# Patient Record
Sex: MALE | Race: WHITE | NOT HISPANIC OR LATINO | Employment: OTHER | URBAN - METROPOLITAN AREA
[De-identification: names, ages, dates, MRNs, and addresses within clinical notes are randomized per-mention and may not be internally consistent; named-entity substitution may affect disease eponyms.]

---

## 2017-02-15 ENCOUNTER — TELEPHONE (OUTPATIENT)
Dept: CARDIOLOGY | Facility: MEDICAL CENTER | Age: 62
End: 2017-02-15

## 2017-02-15 ENCOUNTER — TELEPHONE (OUTPATIENT)
Dept: PULMONOLOGY | Facility: HOSPICE | Age: 62
End: 2017-02-15

## 2017-02-15 DIAGNOSIS — E78.5 HYPERLIPIDEMIA, UNSPECIFIED HYPERLIPIDEMIA TYPE: ICD-10-CM

## 2017-02-15 DIAGNOSIS — I25.10 CORONARY ARTERY DISEASE INVOLVING NATIVE HEART WITHOUT ANGINA PECTORIS, UNSPECIFIED VESSEL OR LESION TYPE: ICD-10-CM

## 2017-02-15 DIAGNOSIS — J44.9 CHRONIC OBSTRUCTIVE PULMONARY DISEASE, UNSPECIFIED COPD TYPE (HCC): Chronic | ICD-10-CM

## 2017-02-15 RX ORDER — METOPROLOL SUCCINATE 50 MG/1
50 TABLET, EXTENDED RELEASE ORAL DAILY
Qty: 90 TAB | Refills: 3 | Status: SHIPPED | OUTPATIENT
Start: 2017-02-15 | End: 2018-01-18 | Stop reason: SDUPTHER

## 2017-02-15 RX ORDER — EZETIMIBE 10 MG/1
10 TABLET ORAL DAILY
Qty: 90 TAB | Refills: 3 | Status: SHIPPED | OUTPATIENT
Start: 2017-02-15 | End: 2017-02-16 | Stop reason: SDUPTHER

## 2017-02-15 RX ORDER — TIOTROPIUM BROMIDE 18 UG/1
CAPSULE ORAL; RESPIRATORY (INHALATION)
Qty: 3 CAP | Refills: 3 | Status: SHIPPED | OUTPATIENT
Start: 2017-02-15 | End: 2018-04-09 | Stop reason: SDUPTHER

## 2017-02-15 NOTE — TELEPHONE ENCOUNTER
Discussed medications in question.  He is questioning if he should be on both Doxazosin 4mg daily & Tamsulosin 0.4 mg daily.  His pharmacist advised him he should not.  They have been filled by his PCP but he is unsure who initially prescribed them.  To Dr. Purvis for recommendations

## 2017-02-15 NOTE — TELEPHONE ENCOUNTER
----- Message from Ade Mcknight sent at 2/15/2017 10:58 AM PST -----  Regarding: med list review  Contact: [phone]  ARLENE/danya  Pt calling to review his med list with regard to mg of 4 meds  (zetia, effient, tizanidine, levothyroxine) and the pharmacy they will be coming from.   Pt [place] out of state recuper[place], can be reached at .

## 2017-02-16 DIAGNOSIS — I25.10 CORONARY ARTERY DISEASE DUE TO CALCIFIED CORONARY LESION: ICD-10-CM

## 2017-02-16 DIAGNOSIS — I25.10 CORONARY ARTERY DISEASE INVOLVING NATIVE HEART WITHOUT ANGINA PECTORIS, UNSPECIFIED VESSEL OR LESION TYPE: ICD-10-CM

## 2017-02-16 DIAGNOSIS — I21.3 ST ELEVATION MYOCARDIAL INFARCTION (STEMI), UNSPECIFIED ARTERY (HCC): ICD-10-CM

## 2017-02-16 DIAGNOSIS — I25.84 CORONARY ARTERY DISEASE DUE TO CALCIFIED CORONARY LESION: ICD-10-CM

## 2017-02-16 DIAGNOSIS — E78.5 HYPERLIPIDEMIA, UNSPECIFIED HYPERLIPIDEMIA TYPE: ICD-10-CM

## 2017-02-16 NOTE — TELEPHONE ENCOUNTER
Caller Name: Norberto White                 Call Back Number: [phone] (home)         Patient approves a detailed voicemail message: yes    Have we ever prescribed this med? Yes.  If yes, what date? 2/14/16    Last OV: 2/21/16    Next OV: 6 month return    DX: COPD    Medications:  Current Outpatient Prescriptions   Medication Sig Dispense Refill   • metoprolol SR (TOPROL XL) 50 MG TABLET SR 24 HR Take 1 Tab by mouth every day. 90 Tab 3   • ezetimibe (ZETIA) 10 MG Tab Take 1 Tab by mouth every day. 90 Tab 3   • temazepam (RESTORIL) 15 MG Cap 1-2 po qhs prn insomnia 60 Cap 1   • Albuterol Sulfate (PROAIR RESPICLICK) 108 (90 BASE) MCG/ACT AEROSOL POWDER, BREATH ACTIVATED Inhale 2 Puffs by mouth as needed (for shortness of breath, wheezing.). every 4-6 hours as needed. 1 Each 5   • SPIRIVA HANDIHALER 18 MCG Cap INHALE THE CONTENTS OF ONE CAPSULE DAILY 90 Cap 3   • prasugrel (EFFIENT) 10 MG Tab Take 1 Tab by mouth every day. 90 Tab 3   • benazepril (LOTENSIN) 20 MG Tab Take 1 Tab by mouth every day. 90 Tab 1   • tizanidine (ZANAFLEX) 4 MG Tab Take 4 mg by mouth every 6 hours as needed.     • Melatonin 10 MG Tab Take  by mouth.     • Cranberry 500 MG Cap Take  by mouth.     • Zinc 50 MG Cap Take 50 mg by mouth every day.     • diphenhydrAMINE (BENADRYL) 25 MG Tab Take 25 mg by mouth every 6 hours as needed for Sleep.     • KRILL OIL PO Take  by mouth.     • calcium polycarbophil (FIBERCON) 625 MG Tab Take 625 mg by mouth every day.     • vitamin D (CHOLECALCIFEROL) 1000 UNIT Tab Take 1,000 Units by mouth every day.     • Flaxseed, Linseed, (FLAXSEED OIL PO) Take  by mouth.     • atorvastatin (LIPITOR) 80 MG tablet Take 1 Tab by mouth every day. 30 Tab 11   • levothyroxine (SYNTHROID) 75 MCG Tab Take 1 Tab by mouth every day. 90 Tab 3   • albuterol (VENTOLIN OR PROVENTIL) 108 (90 BASE) MCG/ACT Aero Soln inhalation aerosol Inhale 2 Puffs by mouth as needed. 8.5 g 3   • furosemide (LASIX) 20 MG TABS Take 1 Tab by  mouth every day. 30 Tab 3   • potassium chloride SA (K-DUR) 10 MEQ TBCR Take 1 Tab by mouth every day. 30 Tab 3   • hydrocodone-acetaminophen (NORCO) 5-325 MG TABS per tablet Take 1-2 Tabs by mouth every four hours as needed ((Pain Scale 1-3) Give 1 tablet first, may give second tablet after 1 hour if pain still unrelieved.). 120 Tab 0   • aspirin EC (ECOTRIN) 81 MG TBEC Take 81 mg by mouth every day.     • tamsulosin (FLOMAX) 0.4 MG capsule Take 0.4 mg by mouth ONE-HALF HOUR AFTER BREAKFAST.     • doxazosin (CARDURA) 4 MG TABS Take 4 mg by mouth every day.     • fluticasone-salmeterol (ADVAIR DISKUS) 500-50 MCG/DOSE AEPB Inhale 1 Puff by mouth every 12 hours.     • nitroglycerin (NITROSTAT) 0.4 MG SUBL Place 1 Tab under tongue as needed for Chest Pain. 25 Tab 3     No current facility-administered medications for this visit.

## 2017-02-17 RX ORDER — EZETIMIBE 10 MG/1
10 TABLET ORAL DAILY
Qty: 90 TAB | Refills: 3 | Status: SHIPPED | OUTPATIENT
Start: 2017-02-17 | End: 2017-12-29 | Stop reason: SDUPTHER

## 2017-02-17 RX ORDER — PRASUGREL 10 MG/1
10 TABLET, FILM COATED ORAL DAILY
Qty: 90 TAB | Refills: 3 | Status: SHIPPED | OUTPATIENT
Start: 2017-02-17 | End: 2017-12-29 | Stop reason: SDUPTHER

## 2017-02-17 NOTE — TELEPHONE ENCOUNTER
Called pt, left message that new scripts were sent to [facility] pharmacy.

## 2017-03-15 DIAGNOSIS — I10 ESSENTIAL HYPERTENSION: ICD-10-CM

## 2017-03-16 RX ORDER — BENAZEPRIL HYDROCHLORIDE 20 MG/1
TABLET ORAL
Qty: 90 TAB | Refills: 0 | Status: SHIPPED | OUTPATIENT
Start: 2017-03-16 | End: 2017-07-03 | Stop reason: SDUPTHER

## 2017-05-02 ENCOUNTER — TELEPHONE (OUTPATIENT)
Dept: PULMONOLOGY | Facility: HOSPICE | Age: 62
End: 2017-05-02

## 2017-05-03 NOTE — TELEPHONE ENCOUNTER
DOCUMENTATION OF PRIOR AUTH STATUS    1. Medication name and dose: Temazepam 15 mg     2. Name and Phone # of Prescription coverage company: [employer] [phone]    3. Date Prior Auth was submitted: 5/2/2017    4. What information was given to obtain insurance decision: Clinical notes    5. Prior Auth letter Approved or Denied: Approved through 5/2/2018    6. Pharmacy notified: Yes    7. Patient notified: Yes

## 2017-06-27 ENCOUNTER — TELEPHONE (OUTPATIENT)
Dept: CARDIOLOGY | Facility: MEDICAL CENTER | Age: 62
End: 2017-06-27

## 2017-06-27 NOTE — TELEPHONE ENCOUNTER
Called patient to find out if he ever had the labs drawn that were ordered by TT on 12/16. Patient states he did not get the labs drawn. Advised patient to go ahead and come in to his appointment still. Patient has a FV with TT on 6/28/17 @ 1:15pm*SHARATH

## 2017-06-28 ENCOUNTER — OFFICE VISIT (OUTPATIENT)
Dept: CARDIOLOGY | Facility: MEDICAL CENTER | Age: 62
End: 2017-06-28
Payer: COMMERCIAL

## 2017-06-28 VITALS
WEIGHT: 315 LBS | SYSTOLIC BLOOD PRESSURE: 108 MMHG | DIASTOLIC BLOOD PRESSURE: 62 MMHG | HEART RATE: 54 BPM | BODY MASS INDEX: 42.66 KG/M2 | OXYGEN SATURATION: 94 % | HEIGHT: 72 IN

## 2017-06-28 DIAGNOSIS — Z95.1 S/P CABG (CORONARY ARTERY BYPASS GRAFT): ICD-10-CM

## 2017-06-28 DIAGNOSIS — G47.33 OSA (OBSTRUCTIVE SLEEP APNEA): ICD-10-CM

## 2017-06-28 DIAGNOSIS — I10 ESSENTIAL HYPERTENSION, BENIGN: ICD-10-CM

## 2017-06-28 DIAGNOSIS — E78.2 MIXED HYPERLIPIDEMIA: ICD-10-CM

## 2017-06-28 DIAGNOSIS — G47.33 OSA ON CPAP: ICD-10-CM

## 2017-06-28 PROCEDURE — 99214 OFFICE O/P EST MOD 30 MIN: CPT | Performed by: INTERNAL MEDICINE

## 2017-06-28 ASSESSMENT — ENCOUNTER SYMPTOMS
LOSS OF CONSCIOUSNESS: 0
WEIGHT LOSS: 0
BRUISES/BLEEDS EASILY: 0
ORTHOPNEA: 0
DOUBLE VISION: 0
PND: 0
BLOOD IN STOOL: 0
EYE DISCHARGE: 0
NAUSEA: 0
HEADACHES: 0
VOMITING: 0
BLURRED VISION: 0
ABDOMINAL PAIN: 0
SENSORY CHANGE: 0
CHILLS: 0
DEPRESSION: 0
HALLUCINATIONS: 0
FEVER: 0
EYE PAIN: 0
CLAUDICATION: 0
PALPITATIONS: 0
MYALGIAS: 0
COUGH: 0
SPEECH CHANGE: 0
SHORTNESS OF BREATH: 0
DIZZINESS: 0
FALLS: 0

## 2017-06-28 NOTE — MR AVS SNAPSHOT
"        Norberto White   2017 1:15 PM   Office Visit   MRN: [id number]    Department:  [facility]   Dept Phone:  [phone]    Description:  Male : 1955   Provider:  Jerry Purvis M.D.           Reason for Visit     Follow-Up           Allergies as of 2017     Allergen Noted Reactions    Cranberry 2016   Unspecified    Morphine 10/14/2014   Anxiety, Unspecified    Pt becomes irritable  Agitation; hot flashes.      You were diagnosed with     Mixed hyperlipidemia   [272.2.ICD-9-CM]       Essential hypertension, benign   [401.1.ICD-9-CM]       S/P CABG (coronary artery bypass graft)   [436754]       ADRYAN (obstructive sleep apnea)   [751896]       ADRYAN on CPAP   [198534]         Vital Signs     Blood Pressure Pulse Height Weight Body Mass Index Oxygen Saturation    108/62 mmHg 54 1.829 m (6' 0.01\") 147.873 kg (326 lb) 44.20 kg/m2 94%    Smoking Status                   Former Smoker           Basic Information     Date Of Birth Sex Race Ethnicity Preferred Language    1955 Male White Non- English      Problem List              ICD-10-CM Priority Class Noted - Resolved    Acute MI, inferolateral wall, initial episode of care (CMS-HCC) I21.19   2013 - Present    Coronary artery disease due to calcified coronary lesion I25.10, I25.84   2014 - Present    Essential hypertension, benign I10   2014 - Present    HLD (hyperlipidemia) E78.5   2014 - Present    Cervical disc disease M50.90   10/13/2014 - Present    STEMI (ST elevation myocardial infarction) (CMS-HCC) I21.3 High  2015 - Present    COPD (chronic obstructive pulmonary disease) (CMS-HCC) (Chronic) J44.9   2015 - Present    Hypothyroid (Chronic) E03.9   2015 - Present    Thrombocytopenia (CMS-HCC) D69.6   2015 - Present    Systolic heart failure secondary to coronary artery disease (CMS-HCC) I50.20, I25.10   2015 - Present    Diastolic heart failure (CMS-HCC) I50.30   " 7/24/2015 - Present    Cardiomyopathy, ischemic I25.5 Medium  7/25/2015 - Present    PAF (paroxysmal atrial fibrillation) (CMS-HCC) I48.0   7/26/2015 - Present    Hypoxia R09.02   10/23/2015 - Present    ADRYAN (obstructive sleep apnea) G47.33   12/14/2016 - Present    Hypoxemia R09.02   12/14/2016 - Present    Hypersomnolence G47.10   12/14/2016 - Present    ADRYAN on CPAP G47.33, Z99.89   6/28/2017 - Present      Health Maintenance        Date Due Completion Dates    IMM DTaP/Tdap/Td Vaccine (1 - Tdap) 5/8/1975 ---    IMM PNEUMOCOCCAL 19-64 (ADULT) MEDIUM RISK SERIES (1 of 1 - PPSV23) 5/8/1975 ---    COLONOSCOPY 5/8/2006 ---    IMM ZOSTER VACCINE 5/8/2016 ---            Current Immunizations     Influenza TIV (IM) 9/1/2014    Pneumococcal Vaccine (PCV7) Historical Data 1/1/2010      Below and/or attached are the medications your provider expects you to take. Review all of your home medications and newly ordered medications with your provider and/or pharmacist. Follow medication instructions as directed by your provider and/or pharmacist. Please keep your medication list with you and share with your provider. Update the information when medications are discontinued, doses are changed, or new medications (including over-the-counter products) are added; and carry medication information at all times in the event of emergency situations     Allergies:  CRANBERRY - Unspecified     MORPHINE - Anxiety,Unspecified               Medications  Valid as of: June 28, 2017 -  2:28 PM    Generic Name Brand Name Tablet Size Instructions for use    Albuterol Sulfate (Aero Soln) albuterol 108 (90 BASE) MCG/ACT Inhale 2 Puffs by mouth as needed.        Albuterol Sulfate (AEROSOL POWDER, BREATH ACTIVATED) Albuterol Sulfate 108 (90 BASE) MCG/ACT Inhale 2 Puffs by mouth as needed (for shortness of breath, wheezing.). every 4-6 hours as needed.        Aspirin (Tablet Delayed Response) ECOTRIN 81 MG Take 81 mg by mouth every day.         Atorvastatin Calcium (Tab) LIPITOR 80 MG Take 1 Tab by mouth every day.        Benazepril HCl (Tab) LOTENSIN 20 MG TAKE 1 TABLET DAILY        Calcium Polycarbophil (Tab) FIBERCON 625 MG Take 625 mg by mouth every day.        Cholecalciferol (Tab) cholecalciferol 1000 UNIT Take 1,000 Units by mouth every day.        Cranberry (Cap) Cranberry 500 MG Take  by mouth.        DiphenhydrAMINE HCl (Tab) BENADRYL 25 MG Take 25 mg by mouth every 6 hours as needed for Sleep.        Doxazosin Mesylate (Tab) CARDURA 4 MG Take 4 mg by mouth every day.        Ezetimibe (Tab) ZETIA 10 MG Take 1 Tab by mouth every day.        Flaxseed (Linseed)   Take  by mouth.        Fluticasone-Salmeterol (AEROSOL POWDER, BREATH ACTIVATED) ADVAIR 500-50 MCG/DOSE Inhale 1 Puff by mouth every 12 hours.        Furosemide (Tab) LASIX 20 MG Take 1 Tab by mouth every day.        Hydrocodone-Acetaminophen (Tab) NORCO 5-325 MG Take 1-2 Tabs by mouth every four hours as needed ((Pain Scale 1-3) Give 1 tablet first, may give second tablet after 1 hour if pain still unrelieved.).        Krill Oil   Take  by mouth.        Levothyroxine Sodium (Tab) SYNTHROID 75 MCG Take 1 Tab by mouth every day.        Melatonin (Tab) Melatonin 10 MG Take  by mouth.        Metoprolol Succinate (TABLET SR 24 HR) TOPROL XL 50 MG Take 1 Tab by mouth every day.        Nitroglycerin (SL Tab) NITROSTAT 0.4 MG Place 1 Tab under tongue as needed for Chest Pain.        Potassium Chloride Diana CR (Tab CR) K-DUR 10 MEQ Take 1 Tab by mouth every day.        Prasugrel HCl (Tab) EFFIENT 10 MG Take 1 Tab by mouth every day.        Tamsulosin HCl (Cap) FLOMAX 0.4 MG Take 0.4 mg by mouth ONE-HALF HOUR AFTER BREAKFAST.        Temazepam (Cap) RESTORIL 15 MG 1-2 po qhs prn insomnia        Tiotropium Bromide Monohydrate (Cap) SPIRIVA 18 MCG INHALE THE CONTENTS OF ONE CAPSULE DAILY        TiZANidine HCl (Tab) ZANAFLEX 4 MG Take 4 mg by mouth every 6 hours as needed.        Zinc (Cap) Zinc 50 MG  Take 50 mg by mouth every day.        .                 Medicines prescribed today were sent to:     [facility] - [place], [place] - [address] [place], [place] [address] [facility] [address]    Phone: [phone] Fax: [phone]    Open 24 Hours?: No    [facility] [place], [place] - [address] [facility]    [address] [facility] [address]    Phone: [phone] Fax: [phone]    Open 24 Hours?: No    [facility] - [address]    [address] [facility] [address] [facility] [address]    Phone: [phone] Fax: [phone]    Open 24 Hours?: No    [facility] - [place], [place] - [address] [place] [address]    Phone: [phone] Fax: [phone]    Open 24 Hours?: No      Medication refill instructions:       If your prescription bottle indicates you have medication refills left, it is not necessary to call your provider’s office. Please contact your pharmacy and they will refill your medication.    If your prescription bottle indicates you do not have any refills left, you may request refills at any time through one of the following ways: The online GenJuice system (except Urgent Care), by calling your provider’s office, or by asking your pharmacy to contact your provider’s office with a refill request. Medication refills are processed only during regular business hours and may not be available until the next business day. Your provider may request additional information or to have a follow-up visit with you prior to refilling your medication.   *Please Note: Medication refills are assigned a new Rx number when refilled electronically. Your pharmacy may indicate that no refills were authorized even though a new prescription for the same medication is available at the pharmacy. Please request the medicine by name with the pharmacy before contacting your provider for a refill.           Your To Do List     Future Labs/Procedures Complete By Expires    COMP METABOLIC PANEL  As directed 6/29/2018         ZinMobi Access Code: [email or web address]  Expires: 7/6/2017  4:17 AM    ZinMobi  A secure, online tool to manage your health information     [employer] ZinMobi® is a secure, online tool that connects you to your personalized health information from the privacy of your home -- day or night - making it very easy for you to manage your healthcare. Once the activation process is completed, you can even access your medical information using the ZinMobi vicki, which is available for free in the Apple Vicki store or Google Play store.     ZinMobi provides the following levels of access (as shown below):   My Chart Features   [facility] Doctor [facility]  Urgent  Care [facility]  Primary Care  Doctor   Email your healthcare team securely and privately 24/7 X X X    Manage appointments: schedule your next appointment; view details of past/upcoming appointments X      Request prescription refills. X      View recent personal medical records, including lab and immunizations X X X X   View health record, including health history, allergies, medications X X X X   Read reports about your outpatient visits, procedures, consult and ER notes X X X X   See your discharge summary, which is a recap of your hospital and/or ER visit that includes your diagnosis, lab results, and care plan. X X       How to register for ZinMobi:  1. Go to  [email or web address].  2. Click on the Sign Up Now box, which takes you to the New Member Sign Up page. You will need to provide the following information:  a. Enter your ZinMobi Access Code exactly as it appears at the top of this page. (You will not need to use this code after you’ve completed the sign-up process. If you do not sign up before the expiration date, you must request a new code.)   b. Enter your date of birth.   c. Enter your home email  address.   d. Click Submit, and follow the next screen’s instructions.  3. Create a Mind The Placet ID. This will be your LeanData login ID and cannot be changed, so think of one that is secure and easy to remember.  4. Create a [employer]acet password. You can change your password at any time.  5. Enter your Password Reset Question and Answer. This can be used at a later time if you forget your password.   6. Enter your e-mail address. This allows you to receive e-mail notifications when new information is available in LeanData.  7. Click Sign Up. You can now view your health information.    For assistance activating your LeanData account, call [phone]

## 2017-06-28 NOTE — PROGRESS NOTES
Subjective:   Norberto White is a 61 y.o. male who presents today for ICM (EF of 45%), HTN, HLP, previous pleural effusion. Patient had lucia course after CABG with persistent pleural effusion requiring repeated thoracentesis.     Recently in April 2016, patient went into the hospital in [facility] for chest pain. At that time patient underwent stenting of his coronary arteries and was told that his venous graft was down.    Patient had LIMA to LAD and saphenous vein graft to distal PDA in the past.    He has ADRYAN and on CPAP. Feeling better these days.    Past Medical History   Diagnosis Date   • EMPHYSEMA    • Acute MI, inferolateral wall, initial episode of care (CMS-HCC) 12/23/2013   • CAD (coronary artery disease) 2/13/2014     Bare-metal stent to the circumflex in December 2013. Prior stent to the LAD diagonal had mild in-stent restenosis. 30% disease is noted in the right coronary artery.   • HLD (hyperlipidemia) 2/13/2014   • Infectious disease      10/5/14 daughter and granddaughter had colds   • Unspecified hemorrhagic conditions      bruises easily related to plavix   • ASTHMA      inhaler daily   • Unspecified disorder of thyroid 2010     on synthroid   • Pain 2013     6/10   • Pain 2014     4/10 neck   • Breath shortness 2013 24/7   • Supplemental oxygen dependent      2.5 liters at night   • Hypertension 2013     states well controlled on meds   • Essential hypertension, benign 2/13/2014   • Other and unspecified angina pectoris    • Arthritis    • Back pain    • Chronic airway obstruction, not elsewhere classified    • Pneumonia    • PAF (paroxysmal atrial fibrillation) (CMS-HCC) 7/26/2015   • ADRYAN on CPAP 6/28/2017     Past Surgical History   Procedure Laterality Date   • Other cardiac surgery  2008     cardiac stents   • Other orthopedic surgery  2012     right knee   • Cervical disk and fusion anterior  10/13/2014     Performed by Moises Kerns M.D. at SURGERY [facility]   • Gyn  surgery     • Multiple coronary artery bypass endo vein harvest N/A 7/23/2015     Procedure: MULTIPLE CORONARY ARTERY BYPASS ENDO VEIN HARVEST;  Surgeon: Deuce Tam M.D.;  Location: SURGERY [facility];  Service:      Family History   Problem Relation Age of Onset   • Heart Attack Father      History   Smoking status   • Former Smoker -- 2.00 packs/day for 20 years   • Types: Cigarettes, Cigars   • Quit date: 10/07/1999   Smokeless tobacco   • Never Used     Allergies   Allergen Reactions   • Cranberry Unspecified   • Morphine Anxiety and Unspecified     Pt becomes irritable  Agitation; hot flashes.     Outpatient Encounter Prescriptions as of 6/28/2017   Medication Sig Dispense Refill   • benazepril (LOTENSIN) 20 MG Tab TAKE 1 TABLET DAILY 90 Tab 0   • ezetimibe (ZETIA) 10 MG Tab Take 1 Tab by mouth every day. 90 Tab 3   • prasugrel (EFFIENT) 10 MG Tab Take 1 Tab by mouth every day. 90 Tab 3   • metoprolol SR (TOPROL XL) 50 MG TABLET SR 24 HR Take 1 Tab by mouth every day. 90 Tab 3   • Albuterol Sulfate (PROAIR RESPICLICK) 108 (90 BASE) MCG/ACT AEROSOL POWDER, BREATH ACTIVATED Inhale 2 Puffs by mouth as needed (for shortness of breath, wheezing.). every 4-6 hours as needed. 3 Each 3   • fluticasone-salmeterol (ADVAIR DISKUS) 500-50 MCG/DOSE AEROSOL POWDER, BREATH ACTIVATED Inhale 1 Puff by mouth every 12 hours. 3 Inhaler 3   • tiotropium (SPIRIVA HANDIHALER) 18 MCG Cap INHALE THE CONTENTS OF ONE CAPSULE DAILY 3 Cap 3   • tizanidine (ZANAFLEX) 4 MG Tab Take 4 mg by mouth every 6 hours as needed.     • Melatonin 10 MG Tab Take  by mouth.     • Cranberry 500 MG Cap Take  by mouth.     • Zinc 50 MG Cap Take 50 mg by mouth every day.     • diphenhydrAMINE (BENADRYL) 25 MG Tab Take 25 mg by mouth every 6 hours as needed for Sleep.     • calcium polycarbophil (FIBERCON) 625 MG Tab Take 625 mg by mouth every day.     • vitamin D (CHOLECALCIFEROL) 1000 UNIT Tab Take 1,000 Units by mouth every day.     •  atorvastatin (LIPITOR) 80 MG tablet Take 1 Tab by mouth every day. 30 Tab 11   • levothyroxine (SYNTHROID) 75 MCG Tab Take 1 Tab by mouth every day. 90 Tab 3   • albuterol (VENTOLIN OR PROVENTIL) 108 (90 BASE) MCG/ACT Aero Soln inhalation aerosol Inhale 2 Puffs by mouth as needed. 8.5 g 3   • furosemide (LASIX) 20 MG TABS Take 1 Tab by mouth every day. 30 Tab 3   • potassium chloride SA (K-DUR) 10 MEQ TBCR Take 1 Tab by mouth every day. 30 Tab 3   • aspirin EC (ECOTRIN) 81 MG TBEC Take 81 mg by mouth every day.     • tamsulosin (FLOMAX) 0.4 MG capsule Take 0.4 mg by mouth ONE-HALF HOUR AFTER BREAKFAST.     • doxazosin (CARDURA) 4 MG TABS Take 4 mg by mouth every day.     • nitroglycerin (NITROSTAT) 0.4 MG SUBL Place 1 Tab under tongue as needed for Chest Pain. 25 Tab 3   • temazepam (RESTORIL) 15 MG Cap 1-2 po qhs prn insomnia 60 Cap 1   • KRILL OIL PO Take  by mouth.     • Flaxseed, Linseed, (FLAXSEED OIL PO) Take  by mouth.     • hydrocodone-acetaminophen (NORCO) 5-325 MG TABS per tablet Take 1-2 Tabs by mouth every four hours as needed ((Pain Scale 1-3) Give 1 tablet first, may give second tablet after 1 hour if pain still unrelieved.). 120 Tab 0     No facility-administered encounter medications on file as of 6/28/2017.     Review of Systems   Constitutional: Negative for fever, chills, weight loss and malaise/fatigue.   HENT: Negative for ear discharge, ear pain, hearing loss and nosebleeds.    Eyes: Negative for blurred vision, double vision, pain and discharge.   Respiratory: Negative for cough and shortness of breath.    Cardiovascular: Negative for chest pain, palpitations, orthopnea, claudication, leg swelling and PND.   Gastrointestinal: Negative for nausea, vomiting, abdominal pain, blood in stool and melena.   Genitourinary: Negative for dysuria and hematuria.   Musculoskeletal: Negative for myalgias, joint pain and falls.   Skin: Negative for itching and rash.   Neurological: Negative for dizziness,  "sensory change, speech change, loss of consciousness and headaches.   Endo/Heme/Allergies: Negative for environmental allergies. Does not bruise/bleed easily.   Psychiatric/Behavioral: Negative for depression, suicidal ideas and hallucinations.        Objective:   /62 mmHg  Pulse 54  Ht 1.829 m (6' 0.01\")  Wt 147.873 kg (326 lb)  BMI 44.20 kg/m2  SpO2 94%    Physical Exam   Constitutional: He is oriented to person, place, and time. He appears well-developed and well-nourished.   HENT:   Head: Normocephalic and atraumatic.   Eyes: EOM are normal.   Neck: Normal range of motion. No JVD present.   Cardiovascular: Normal rate, regular rhythm, normal heart sounds and intact distal pulses.  Exam reveals no gallop and no friction rub.    No murmur heard.  Bilateral femoral pulses are 2+, bilateral dorsalis pedis pulses are 2+, bilateral posterior tibialis pulses are 2+.   Pulmonary/Chest: No respiratory distress. He has no wheezes. He has no rales. He exhibits no tenderness.   Abdominal: Soft. Bowel sounds are normal. There is no tenderness. There is no rebound and no guarding.   The is no presence of abdominal bruits   Musculoskeletal: Normal range of motion.   Neurological: He is alert and oriented to person, place, and time.   Skin: Skin is warm and dry.   Psychiatric: He has a normal mood and affect.   Nursing note and vitals reviewed.      Assessment:     1. Mixed hyperlipidemia  COMP METABOLIC PANEL    LIPID PANEL   2. Essential hypertension, benign  COMP METABOLIC PANEL    LIPID PANEL   3. S/P CABG (coronary artery bypass graft)  COMP METABOLIC PANEL    LIPID PANEL   4. ADRYAN (obstructive sleep apnea)     5. ADRYAN on CPAP         Medical Decision Making:  Today's Assessment / Status / Plan:     At this time patient is clinically stable in terms of his cardiac standpoint.  Cont current medications at current dose.   Blood pressure is well controlled.  Continue ASA, Prasugrel, Toprol XL 50 mg daily, Benazepril " 20 mg po daily, Atorvastatin 80 mg daily.    I will see patient back in clinic with lab tests and studies results in 12 months.    I thank you Dr. Velarde for referring patient to our Cardiology Clinic today.

## 2017-06-28 NOTE — Clinical Note
[facility]   [address]  [place] [address]  Phone: [phone]  Fax: [phone]              Norberto White  1955    Encounter Date: 6/28/2017    Jerry Purvis M.D.          PROGRESS NOTE:  Subjective:   Norberto White is a 61 y.o. male who presents today for ICM (EF of 45%), HTN, HLP, previous pleural effusion. Patient had lucia course after CABG with persistent pleural effusion requiring repeated thoracentesis.     Recently in April 2016, patient went into the hospital in [facility] for chest pain. At that time patient underwent stenting of his coronary arteries and was told that his venous graft was down.    Patient had LIMA to LAD and saphenous vein graft to distal PDA in the past.    He has ADRYAN and on CPAP. Feeling better these days.    Past Medical History   Diagnosis Date   • EMPHYSEMA    • Acute MI, inferolateral wall, initial episode of care (CMS-HCC) 12/23/2013   • CAD (coronary artery disease) 2/13/2014     Bare-metal stent to the circumflex in December 2013. Prior stent to the LAD diagonal had mild in-stent restenosis. 30% disease is noted in the right coronary artery.   • HLD (hyperlipidemia) 2/13/2014   • Infectious disease      10/5/14 daughter and granddaughter had colds   • Unspecified hemorrhagic conditions      bruises easily related to plavix   • ASTHMA      inhaler daily   • Unspecified disorder of thyroid 2010     on synthroid   • Pain 2013     6/10   • Pain 2014     4/10 neck   • Breath shortness 2013 24/7   • Supplemental oxygen dependent      2.5 liters at night   • Hypertension 2013     states well controlled on meds   • Essential hypertension, benign 2/13/2014   • Other and unspecified angina pectoris    • Arthritis    • Back pain    • Chronic airway obstruction, not elsewhere classified    • Pneumonia    • PAF (paroxysmal atrial fibrillation) (CMS-HCC) 7/26/2015   • ADRYAN on CPAP 6/28/2017          Past Surgical History   Procedure Laterality Date   • Other cardiac surgery  2008     cardiac stents   • Other orthopedic surgery  2012     right knee   • Cervical disk and fusion anterior  10/13/2014     Performed by Moises Kerns M.D. at SURGERY [facility]   • Gyn surgery     • Multiple coronary artery bypass endo vein harvest N/A 7/23/2015     Procedure: MULTIPLE CORONARY ARTERY BYPASS ENDO VEIN HARVEST;  Surgeon: Deuce Tam M.D.;  Location: SURGERY [facility];  Service:      Family History   Problem Relation Age of Onset   • Heart Attack Father      History   Smoking status   • Former Smoker -- 2.00 packs/day for 20 years   • Types: Cigarettes, Cigars   • Quit date: 10/07/1999   Smokeless tobacco   • Never Used     Allergies   Allergen Reactions   • Cranberry Unspecified   • Morphine Anxiety and Unspecified     Pt becomes irritable  Agitation; hot flashes.     Outpatient Encounter Prescriptions as of 6/28/2017   Medication Sig Dispense Refill   • benazepril (LOTENSIN) 20 MG Tab TAKE 1 TABLET DAILY 90 Tab 0   • ezetimibe (ZETIA) 10 MG Tab Take 1 Tab by mouth every day. 90 Tab 3   • prasugrel (EFFIENT) 10 MG Tab Take 1 Tab by mouth every day. 90 Tab 3   • metoprolol SR (TOPROL XL) 50 MG TABLET SR 24 HR Take 1 Tab by mouth every day. 90 Tab 3   • Albuterol Sulfate (PROAIR RESPICLICK) 108 (90 BASE) MCG/ACT AEROSOL POWDER, BREATH ACTIVATED Inhale 2 Puffs by mouth as needed (for shortness of breath, wheezing.). every 4-6 hours as needed. 3 Each 3   • fluticasone-salmeterol (ADVAIR DISKUS) 500-50 MCG/DOSE AEROSOL POWDER, BREATH ACTIVATED Inhale 1 Puff by mouth every 12 hours. 3 Inhaler 3   • tiotropium (SPIRIVA HANDIHALER) 18 MCG Cap INHALE THE CONTENTS OF ONE CAPSULE DAILY 3 Cap 3   • tizanidine (ZANAFLEX) 4 MG Tab Take 4 mg by mouth every 6 hours as needed.     • Melatonin 10 MG Tab Take  by mouth.     • Cranberry 500 MG Cap Take  by mouth.     • Zinc 50 MG Cap Take 50 mg by mouth every day.      • diphenhydrAMINE (BENADRYL) 25 MG Tab Take 25 mg by mouth every 6 hours as needed for Sleep.     • calcium polycarbophil (FIBERCON) 625 MG Tab Take 625 mg by mouth every day.     • vitamin D (CHOLECALCIFEROL) 1000 UNIT Tab Take 1,000 Units by mouth every day.     • atorvastatin (LIPITOR) 80 MG tablet Take 1 Tab by mouth every day. 30 Tab 11   • levothyroxine (SYNTHROID) 75 MCG Tab Take 1 Tab by mouth every day. 90 Tab 3   • albuterol (VENTOLIN OR PROVENTIL) 108 (90 BASE) MCG/ACT Aero Soln inhalation aerosol Inhale 2 Puffs by mouth as needed. 8.5 g 3   • furosemide (LASIX) 20 MG TABS Take 1 Tab by mouth every day. 30 Tab 3   • potassium chloride SA (K-DUR) 10 MEQ TBCR Take 1 Tab by mouth every day. 30 Tab 3   • aspirin EC (ECOTRIN) 81 MG TBEC Take 81 mg by mouth every day.     • tamsulosin (FLOMAX) 0.4 MG capsule Take 0.4 mg by mouth ONE-HALF HOUR AFTER BREAKFAST.     • doxazosin (CARDURA) 4 MG TABS Take 4 mg by mouth every day.     • nitroglycerin (NITROSTAT) 0.4 MG SUBL Place 1 Tab under tongue as needed for Chest Pain. 25 Tab 3   • temazepam (RESTORIL) 15 MG Cap 1-2 po qhs prn insomnia 60 Cap 1   • KRILL OIL PO Take  by mouth.     • Flaxseed, Linseed, (FLAXSEED OIL PO) Take  by mouth.     • hydrocodone-acetaminophen (NORCO) 5-325 MG TABS per tablet Take 1-2 Tabs by mouth every four hours as needed ((Pain Scale 1-3) Give 1 tablet first, may give second tablet after 1 hour if pain still unrelieved.). 120 Tab 0     No facility-administered encounter medications on file as of 6/28/2017.     Review of Systems   Constitutional: Negative for fever, chills, weight loss and malaise/fatigue.   HENT: Negative for ear discharge, ear pain, hearing loss and nosebleeds.    Eyes: Negative for blurred vision, double vision, pain and discharge.   Respiratory: Negative for cough and shortness of breath.    Cardiovascular: Negative for chest pain, palpitations, orthopnea, claudication, leg swelling and PND.   Gastrointestinal:  "Negative for nausea, vomiting, abdominal pain, blood in stool and melena.   Genitourinary: Negative for dysuria and hematuria.   Musculoskeletal: Negative for myalgias, joint pain and falls.   Skin: Negative for itching and rash.   Neurological: Negative for dizziness, sensory change, speech change, loss of consciousness and headaches.   Endo/Heme/Allergies: Negative for environmental allergies. Does not bruise/bleed easily.   Psychiatric/Behavioral: Negative for depression, suicidal ideas and hallucinations.        Objective:   /62 mmHg  Pulse 54  Ht 1.829 m (6' 0.01\")  Wt 147.873 kg (326 lb)  BMI 44.20 kg/m2  SpO2 94%    Physical Exam   Constitutional: He is oriented to person, place, and time. He appears well-developed and well-nourished.   HENT:   Head: Normocephalic and atraumatic.   Eyes: EOM are normal.   Neck: Normal range of motion. No JVD present.   Cardiovascular: Normal rate, regular rhythm, normal heart sounds and intact distal pulses.  Exam reveals no gallop and no friction rub.    No murmur heard.  Bilateral femoral pulses are 2+, bilateral dorsalis pedis pulses are 2+, bilateral posterior tibialis pulses are 2+.   Pulmonary/Chest: No respiratory distress. He has no wheezes. He has no rales. He exhibits no tenderness.   Abdominal: Soft. Bowel sounds are normal. There is no tenderness. There is no rebound and no guarding.   The is no presence of abdominal bruits   Musculoskeletal: Normal range of motion.   Neurological: He is alert and oriented to person, place, and time.   Skin: Skin is warm and dry.   Psychiatric: He has a normal mood and affect.   Nursing note and vitals reviewed.      Assessment:     1. Mixed hyperlipidemia  COMP METABOLIC PANEL    LIPID PANEL   2. Essential hypertension, benign  COMP METABOLIC PANEL    LIPID PANEL   3. S/P CABG (coronary artery bypass graft)  COMP METABOLIC PANEL    LIPID PANEL   4. ADRYAN (obstructive sleep apnea)     5. ADRYAN on CPAP         Medical " Decision Making:  Today's Assessment / Status / Plan:     At this time patient is clinically stable in terms of his cardiac standpoint.  Cont current medications at current dose.   Blood pressure is well controlled.  Continue ASA, Prasugrel, Toprol XL 50 mg daily, Benazepril 20 mg po daily, Atorvastatin 80 mg daily.    I will see patient back in clinic with lab tests and studies results in 12 months.    I thank you Dr. Velarde for referring patient to our Cardiology Clinic today.        Guillermo Velarde M.D.  [address] [place] [address]  VIA Facsimile: [phone]

## 2017-07-03 DIAGNOSIS — I10 ESSENTIAL HYPERTENSION: ICD-10-CM

## 2017-07-03 RX ORDER — BENAZEPRIL HYDROCHLORIDE 20 MG/1
20 TABLET ORAL DAILY
Qty: 90 TAB | Refills: 3 | Status: SHIPPED | OUTPATIENT
Start: 2017-07-03 | End: 2017-07-05 | Stop reason: SDUPTHER

## 2017-07-05 DIAGNOSIS — I10 ESSENTIAL HYPERTENSION: ICD-10-CM

## 2017-07-05 RX ORDER — BENAZEPRIL HYDROCHLORIDE 20 MG/1
20 TABLET ORAL DAILY
Qty: 30 TAB | Refills: 0 | Status: SHIPPED | OUTPATIENT
Start: 2017-07-05 | End: 2018-05-13 | Stop reason: SDUPTHER

## 2017-07-06 ENCOUNTER — SLEEP CENTER VISIT (OUTPATIENT)
Dept: SLEEP MEDICINE | Facility: MEDICAL CENTER | Age: 62
End: 2017-07-06
Payer: COMMERCIAL

## 2017-07-06 VITALS
HEIGHT: 72 IN | WEIGHT: 315 LBS | TEMPERATURE: 89.5 F | HEART RATE: 59 BPM | OXYGEN SATURATION: 95 % | DIASTOLIC BLOOD PRESSURE: 64 MMHG | RESPIRATION RATE: 16 BRPM | SYSTOLIC BLOOD PRESSURE: 110 MMHG | BODY MASS INDEX: 42.66 KG/M2

## 2017-07-06 DIAGNOSIS — G47.31 CENTRAL SLEEP APNEA: ICD-10-CM

## 2017-07-06 DIAGNOSIS — G47.00 INSOMNIA, UNSPECIFIED TYPE: ICD-10-CM

## 2017-07-06 DIAGNOSIS — J44.9 CHRONIC OBSTRUCTIVE PULMONARY DISEASE, UNSPECIFIED COPD TYPE (HCC): Chronic | ICD-10-CM

## 2017-07-06 PROCEDURE — 99213 OFFICE O/P EST LOW 20 MIN: CPT | Performed by: NURSE PRACTITIONER

## 2017-07-06 RX ORDER — TEMAZEPAM 15 MG/1
15 CAPSULE ORAL NIGHTLY PRN
Qty: 30 CAP | Refills: 5 | Status: SHIPPED | OUTPATIENT
Start: 2017-07-06 | End: 2017-12-19 | Stop reason: SDUPTHER

## 2017-07-06 NOTE — MR AVS SNAPSHOT
"        Norberto White   2017 11:20 AM   Sleep Center Visit   MRN: [id number]    Department:  [facility]   Dept Phone:  [phone]    Description:  Male : 1955   Provider:  DYLAN Bess           Reason for Visit     Apnea Compliance      Allergies as of 2017     Allergen Noted Reactions    Cranberry 2016   Unspecified    Morphine 10/14/2014   Anxiety, Unspecified    Pt becomes irritable  Agitation; hot flashes.      You were diagnosed with     Chronic obstructive pulmonary disease, unspecified COPD type (CMS-HCC)   [4641202]       Central sleep apnea   [634691]       Insomnia, unspecified type   [2818286]         Vital Signs     Blood Pressure Pulse Temperature Respirations Height Weight    110/64 mmHg 59 31.9 °C (89.5 °F) 16 1.829 m (6' 0.01\") 145.605 kg (321 lb)    Body Mass Index Oxygen Saturation Smoking Status             43.53 kg/m2 95% Former Smoker         Basic Information     Date Of Birth Sex Race Ethnicity Preferred Language    1955 Male White Non- English      Your appointments     Dec 19, 2017  1:00 PM   Follow UP with DYLAN Bess   [facility] (--)    [address] [facility] [id number] [phone]              Problem List              ICD-10-CM Priority Class Noted - Resolved    Acute MI, inferolateral wall, initial episode of care (CMS-HCC) I21.19   2013 - Present    Coronary artery disease due to calcified coronary lesion I25.10, I25.84   2014 - Present    Essential hypertension, benign I10   2014 - Present    HLD (hyperlipidemia) E78.5   2014 - Present    Cervical disc disease M50.90   10/13/2014 - Present    STEMI (ST elevation myocardial infarction) (CMS-HCC) I21.3 High  2015 - Present    COPD (chronic obstructive pulmonary disease) (CMS-HCC) (Chronic) J44.9   2015 - Present    Hypothyroid (Chronic) E03.9   2015 - Present    Thrombocytopenia (CMS-HCC) " D69.6   7/24/2015 - Present    Systolic heart failure secondary to coronary artery disease (CMS-HCC) I50.20, I25.10   7/24/2015 - Present    Diastolic heart failure (CMS-HCC) I50.30   7/24/2015 - Present    Cardiomyopathy, ischemic I25.5 Medium  7/25/2015 - Present    PAF (paroxysmal atrial fibrillation) (CMS-HCC) I48.0   7/26/2015 - Present    Hypoxia R09.02   10/23/2015 - Present    Hypoxemia R09.02   12/14/2016 - Present    Hypersomnolence G47.10   12/14/2016 - Present    Central sleep apnea G47.31   6/28/2017 - Present      Health Maintenance        Date Due Completion Dates    IMM DTaP/Tdap/Td Vaccine (1 - Tdap) 5/8/1975 ---    IMM PNEUMOCOCCAL 19-64 (ADULT) MEDIUM RISK SERIES (1 of 1 - PPSV23) 5/8/1975 ---    COLONOSCOPY 5/8/2006 ---    IMM ZOSTER VACCINE 5/8/2016 ---    IMM INFLUENZA (1) 9/1/2017 9/1/2014            Current Immunizations     Influenza TIV (IM) 9/1/2014    Pneumococcal Vaccine (PCV7) Historical Data 1/1/2010      Below and/or attached are the medications your provider expects you to take. Review all of your home medications and newly ordered medications with your provider and/or pharmacist. Follow medication instructions as directed by your provider and/or pharmacist. Please keep your medication list with you and share with your provider. Update the information when medications are discontinued, doses are changed, or new medications (including over-the-counter products) are added; and carry medication information at all times in the event of emergency situations     Allergies:  CRANBERRY - Unspecified     MORPHINE - Anxiety,Unspecified               Medications  Valid as of: July 06, 2017 - 11:51 AM    Generic Name Brand Name Tablet Size Instructions for use    Albuterol Sulfate (Aero Soln) albuterol 108 (90 BASE) MCG/ACT Inhale 2 Puffs by mouth as needed.        Albuterol Sulfate (AEROSOL POWDER, BREATH ACTIVATED) Albuterol Sulfate 108 (90 BASE) MCG/ACT Inhale 2 Puffs by mouth as needed (for  shortness of breath, wheezing.). every 4-6 hours as needed.        Aspirin (Tablet Delayed Response) ECOTRIN 81 MG Take 81 mg by mouth every day.        Atorvastatin Calcium (Tab) LIPITOR 80 MG Take 1 Tab by mouth every day.        Benazepril HCl (Tab) LOTENSIN 20 MG Take 1 Tab by mouth every day.        Calcium Polycarbophil (Tab) FIBERCON 625 MG Take 625 mg by mouth every day.        Cholecalciferol (Tab) cholecalciferol 1000 UNIT Take 1,000 Units by mouth every day.        Cranberry (Cap) Cranberry 500 MG Take  by mouth.        DiphenhydrAMINE HCl (Tab) BENADRYL 25 MG Take 25 mg by mouth every 6 hours as needed for Sleep.        Doxazosin Mesylate (Tab) CARDURA 4 MG Take 4 mg by mouth every day.        Ezetimibe (Tab) ZETIA 10 MG Take 1 Tab by mouth every day.        Flaxseed (Linseed)   Take  by mouth.        Fluticasone-Salmeterol (AEROSOL POWDER, BREATH ACTIVATED) ADVAIR 500-50 MCG/DOSE Inhale 1 Puff by mouth every 12 hours.        Furosemide (Tab) LASIX 20 MG Take 1 Tab by mouth every day.        Hydrocodone-Acetaminophen (Tab) NORCO 5-325 MG Take 1-2 Tabs by mouth every four hours as needed ((Pain Scale 1-3) Give 1 tablet first, may give second tablet after 1 hour if pain still unrelieved.).        Krill Oil   Take  by mouth.        Levothyroxine Sodium (Tab) SYNTHROID 75 MCG Take 1 Tab by mouth every day.        Melatonin (Tab) Melatonin 10 MG Take  by mouth.        Metoprolol Succinate (TABLET SR 24 HR) TOPROL XL 50 MG Take 1 Tab by mouth every day.        Nitroglycerin (SL Tab) NITROSTAT 0.4 MG Place 1 Tab under tongue as needed for Chest Pain.        Potassium Chloride Diana CR (Tab CR) K-DUR 10 MEQ Take 1 Tab by mouth every day.        Prasugrel HCl (Tab) EFFIENT 10 MG Take 1 Tab by mouth every day.        Tamsulosin HCl (Cap) FLOMAX 0.4 MG Take 0.4 mg by mouth ONE-HALF HOUR AFTER BREAKFAST.        Temazepam (Cap) RESTORIL 15 MG Take 1 Cap by mouth at bedtime as needed for Sleep.        Tiotropium  Bromide Monohydrate (Cap) SPIRIVA 18 MCG INHALE THE CONTENTS OF ONE CAPSULE DAILY        TiZANidine HCl (Tab) ZANAFLEX 4 MG Take 4 mg by mouth every 6 hours as needed.        Zinc (Cap) Zinc 50 MG Take 50 mg by mouth every day.        .                 Medicines prescribed today were sent to:     [facility] - [place], [place] - [address]    [address] [address]    Phone: [phone] Fax: [phone]    Open 24 Hours?: No    [facility] [place] [address] - [address] [place] [address]    [address] [place] [address]    Phone: [phone] Fax: [phone]    Open 24 Hours?: No    [facility] - [address] [facility] [address]    Phone: [phone] Fax: [phone]    Open 24 Hours?: No      Medication refill instructions:       If your prescription bottle indicates you have medication refills left, it is not necessary to call your provider’s office. Please contact your pharmacy and they will refill your medication.    If your prescription bottle indicates you do not have any refills left, you may request refills at any time through one of the following ways: The online "[employer]" system (except Urgent Care), by calling your provider’s office, or by asking your pharmacy to contact your provider’s office with a refill request. Medication refills are processed only during regular business hours and may not be available until the next business day. Your provider may request additional information or to have a follow-up visit with you prior to refilling your medication.   *Please Note: Medication refills are assigned a new Rx number when refilled electronically. Your pharmacy may indicate that no refills were authorized even though a new prescription for the same medication is available at the pharmacy. Please request the medicine by name with the pharmacy before contacting your provider for a  refill.        Instructions    1. Continue ASV nightly.  2. Clean equipment weekly and replace supplies as allowed by insurance.  3. Sinus rinse daily.  4. Follow with Flonase Sensimist.  5. Temazepam 15 mg #30, 5 refills.            [employer] Access Code: [email or web address]  Expires: 8/5/2017 11:07 AM    [employer]  A secure, online tool to manage your health information     [employer]’s [employer]® is a secure, online tool that connects you to your personalized health information from the privacy of your home -- day or night - making it very easy for you to manage your healthcare. Once the activation process is completed, you can even access your medical information using the [employer] vicki, which is available for free in the Apple Vicki store or Google Play store.     [employer] provides the following levels of access (as shown below):   My Chart Features   [facility] Primary Care Doctor [facility]  Specialists [facility]  Urgent  Care Non-[facility]  Primary Care  Doctor   Email your healthcare team securely and privately 24/7 X X X    Manage appointments: schedule your next appointment; view details of past/upcoming appointments X      Request prescription refills. X      View recent personal medical records, including lab and immunizations X X X X   View health record, including health history, allergies, medications X X X X   Read reports about your outpatient visits, procedures, consult and ER notes X X X X   See your discharge summary, which is a recap of your hospital and/or ER visit that includes your diagnosis, lab results, and care plan. X X       How to register for [employer]:  1. Go to  [email or web address].  2. Click on the Sign Up Now box, which takes you to the New Member Sign Up page. You will need to provide the following information:  a. Enter your [employer] Access Code exactly as it appears at the top of this page. (You will not need to use this code after you’ve completed the sign-up process. If you do not sign up before  the expiration date, you must request a new code.)   b. Enter your date of birth.   c. Enter your home email address.   d. Click Submit, and follow the next screen’s instructions.  3. Create a [employer] ID. This will be your [employer] login ID and cannot be changed, so think of one that is secure and easy to remember.  4. Create a [employer] password. You can change your password at any time.  5. Enter your Password Reset Question and Answer. This can be used at a later time if you forget your password.   6. Enter your e-mail address. This allows you to receive e-mail notifications when new information is available in [employer].  7. Click Sign Up. You can now view your health information.    For assistance activating your [employer] account, call [phone]

## 2017-07-06 NOTE — PATIENT INSTRUCTIONS
1. Continue ASV nightly.  2. Clean equipment weekly and replace supplies as allowed by insurance.  3. Sinus rinse daily.  4. Follow with Flonase Sensimist.  5. Temazepam 15 mg #30, 5 refills.

## 2017-07-12 ENCOUNTER — TELEPHONE (OUTPATIENT)
Dept: PULMONOLOGY | Facility: HOSPICE | Age: 62
End: 2017-07-12

## 2017-07-12 NOTE — TELEPHONE ENCOUNTER
[facility] called to verify the script for temazepam (RESTORIL) 15 MG Cap. Per the state of [place] they are only allowed to fill up to 4 refills. Pt was written for 30 caps 5 refills. He will need to call to get more refills  After 4

## 2017-12-19 ENCOUNTER — SLEEP CENTER VISIT (OUTPATIENT)
Dept: SLEEP MEDICINE | Facility: MEDICAL CENTER | Age: 62
End: 2017-12-19
Payer: COMMERCIAL

## 2017-12-19 VITALS
SYSTOLIC BLOOD PRESSURE: 132 MMHG | RESPIRATION RATE: 16 BRPM | HEIGHT: 72 IN | BODY MASS INDEX: 39.96 KG/M2 | WEIGHT: 295 LBS | OXYGEN SATURATION: 94 % | HEART RATE: 60 BPM | DIASTOLIC BLOOD PRESSURE: 92 MMHG

## 2017-12-19 DIAGNOSIS — G47.00 INSOMNIA, UNSPECIFIED TYPE: ICD-10-CM

## 2017-12-19 DIAGNOSIS — J44.9 CHRONIC OBSTRUCTIVE PULMONARY DISEASE, UNSPECIFIED COPD TYPE (HCC): Chronic | ICD-10-CM

## 2017-12-19 DIAGNOSIS — G47.31 CENTRAL SLEEP APNEA: ICD-10-CM

## 2017-12-19 PROCEDURE — 99213 OFFICE O/P EST LOW 20 MIN: CPT | Performed by: NURSE PRACTITIONER

## 2017-12-19 RX ORDER — BENAZEPRIL HYDROCHLORIDE 5 MG/1
5 TABLET ORAL
COMMUNITY
End: 2018-01-19

## 2017-12-19 RX ORDER — PRASUGREL 10 MG/1
10 TABLET, FILM COATED ORAL
COMMUNITY
Start: 2016-04-07 | End: 2018-01-19

## 2017-12-19 RX ORDER — DOXAZOSIN MESYLATE 4 MG/1
4 TABLET ORAL
COMMUNITY
End: 2018-01-19

## 2017-12-19 RX ORDER — POTASSIUM CHLORIDE 750 MG/1
TABLET, FILM COATED, EXTENDED RELEASE ORAL
COMMUNITY
End: 2018-01-19

## 2017-12-19 RX ORDER — LEVOTHYROXINE SODIUM 0.07 MG/1
75 TABLET ORAL
COMMUNITY

## 2017-12-19 RX ORDER — ATORVASTATIN CALCIUM 40 MG/1
80 TABLET, FILM COATED ORAL
COMMUNITY
End: 2018-01-19

## 2017-12-19 RX ORDER — EZETIMIBE 10 MG/1
10 TABLET ORAL
COMMUNITY
End: 2018-01-19

## 2017-12-19 RX ORDER — TEMAZEPAM 15 MG/1
15 CAPSULE ORAL NIGHTLY PRN
Qty: 30 CAP | Refills: 5 | Status: SHIPPED | OUTPATIENT
Start: 2017-12-19 | End: 2018-05-28 | Stop reason: SDUPTHER

## 2017-12-19 RX ORDER — FUROSEMIDE 20 MG/1
20 TABLET ORAL
COMMUNITY
End: 2018-01-19

## 2017-12-19 RX ORDER — NITROGLYCERIN 0.4 MG/1
0.4 TABLET SUBLINGUAL
COMMUNITY
Start: 2016-04-07 | End: 2018-01-19

## 2017-12-19 RX ORDER — OMEPRAZOLE 40 MG/1
40 CAPSULE, DELAYED RELEASE ORAL DAILY
COMMUNITY
End: 2021-03-15 | Stop reason: SDUPTHER

## 2017-12-19 RX ORDER — PNEUMOCOCCAL 13-VALENT CONJUGATE VACCINE 2.2; 2.2; 2.2; 2.2; 2.2; 4.4; 2.2; 2.2; 2.2; 2.2; 2.2; 2.2; 2.2 UG/.5ML; UG/.5ML; UG/.5ML; UG/.5ML; UG/.5ML; UG/.5ML; UG/.5ML; UG/.5ML; UG/.5ML; UG/.5ML; UG/.5ML; UG/.5ML; UG/.5ML
INJECTION, SUSPENSION INTRAMUSCULAR
Refills: 0 | COMMUNITY
Start: 2017-12-08 | End: 2020-03-05

## 2017-12-19 RX ORDER — METOPROLOL SUCCINATE 50 MG/1
50 TABLET, EXTENDED RELEASE ORAL
COMMUNITY
End: 2018-01-19

## 2017-12-19 RX ORDER — TAMSULOSIN HYDROCHLORIDE 0.4 MG/1
0.4 CAPSULE ORAL
COMMUNITY
End: 2018-07-09

## 2017-12-19 RX ORDER — INFLUENZA VIRUS VACCINE 15; 15; 15; 15 UG/.5ML; UG/.5ML; UG/.5ML; UG/.5ML
SUSPENSION INTRAMUSCULAR
Refills: 0 | COMMUNITY
Start: 2017-12-08 | End: 2020-03-05

## 2017-12-19 NOTE — PROGRESS NOTES
Chief Complaint   Patient presents with   • Follow-Up         HPI:  This is a 62 y.o. male with a history of chronic obstructive pulmonary disease and central sleep apnea. Pulmonary function tests from 12/12/16 indicates FEV1 2.01 L, 52% predicted, FEV1/FVC percent, and DLCO 112% predicted. Patient is compliant with Advair 500/50 µg twice daily, Spiriva daily and Ventolin HFA as needed. Patient denies any significant shortness of breath or wheezing. He denies fevers, chills, sweats, and hemoptysis. The patient has been having some issues with reflux. I recommend he follow up with primary care. He is quite tired in the daytime. I have recommended follow-up with the titration study. At this time he is declining.    Polysomnogram indicates AHI 14.7 and minimum saturation 84%. The patient is compliant with ASV max pressure 20, EPAP 5/15, PS3/15. Compliance dated 11/19-12/18/17 indicates 100% compliance. The patient is using his machine 8 hours 21 minutes. His AHI has been reduced to 3.1. He continues to use temazepam 15 mg with Benadryl and melatonin to help facilitate sleep. I have explained that our process for controlled substances will be changing in the future I'm not sure what sort of rolled we will have in the future for him to obtain his temazepam.    Past Medical History:   Diagnosis Date   • Acute MI, inferolateral wall, initial episode of care (CMS-HCC) 12/23/2013   • Arthritis    • ASTHMA     inhaler daily   • Back pain    • Breath shortness 2013 24/7   • CAD (coronary artery disease) 2/13/2014    Bare-metal stent to the circumflex in December 2013. Prior stent to the LAD diagonal had mild in-stent restenosis. 30% disease is noted in the right coronary artery.   • Chronic airway obstruction, not elsewhere classified    • EMPHYSEMA    • Essential hypertension, benign 2/13/2014   • HLD (hyperlipidemia) 2/13/2014   • Hypertension 2013    states well controlled on meds   • Infectious disease     10/5/14  "daughter and granddaughter had colds   • ADRYAN on CPAP 6/28/2017   • Other and unspecified angina pectoris    • PAF (paroxysmal atrial fibrillation) (CMS-HCC) 7/26/2015   • Pain 2013    6/10   • Pain 2014    4/10 neck   • Pneumonia    • Supplemental oxygen dependent     2.5 liters at night   • Unspecified disorder of thyroid 2010    on synthroid   • Unspecified hemorrhagic conditions     bruises easily related to plavix       Past Surgical History:   Procedure Laterality Date   • MULTIPLE CORONARY ARTERY BYPASS ENDO VEIN HARVEST N/A 7/23/2015    Procedure: MULTIPLE CORONARY ARTERY BYPASS ENDO VEIN HARVEST;  Surgeon: Deuce Tam M.D.;  Location: SURGERY [facility];  Service:    • CERVICAL DISK AND FUSION ANTERIOR  10/13/2014    Performed by Moises Kerns M.D. at SURGERY [facility]   • OTHER ORTHOPEDIC SURGERY  2012    right knee   • OTHER CARDIAC SURGERY  2008    cardiac stents   • GYN SURGERY         Social History   Substance Use Topics   • Smoking status: Former Smoker     Packs/day: 2.00     Years: 20.00     Types: Cigarettes, Cigars     Quit date: 10/7/1999   • Smokeless tobacco: Never Used   • Alcohol use No       ROS:   Constitutional: Denies fevers, chills, sweats, fatigue, and weight loss.  Eyes: Denies glasses.  Ears/nose/mouth/throat: Denies injury.  Cardiovascular: Denies chest pain, tightness.  Respiratory: See history of present illness.  GI: Denies heartburn, difficulty swallowing, nausea, and vomiting.  Neurological: Denies frequent headaches, dizziness, weakness.    Vitals:  Vitals:    12/19/17 1259   Height: 1.829 m (6' 0.01\")   Weight: (!) 133.8 kg (295 lb)   Weight % change since last entry.: 0 %   BP: 132/92   Pulse: 60   BMI (Calculated): 40   Resp: 16   O2 sat % room air: 94 %       Allergies:  Cranberry and Morphine    Medications:  Current Outpatient Prescriptions   Medication Sig Dispense Refill   • potassium chloride ER (KLOR-CON) 10 MEQ tablet Take 10 mEq by mouth daily.   "   • aspirin EC (ECOTRIN) 81 MG Tablet Delayed Response 81 mg.     • doxazosin (CARDURA) 4 MG Tab 4 mg.     • nitroglycerin (NITROSTAT) 0.4 MG SL Tab 0.4 mg.     • prasugrel (EFFIENT) 10 MG Tab 10 mg.     • tamsulosin (FLOMAX) 0.4 MG capsule 0.4 mg.     • temazepam (RESTORIL) 15 MG Cap Take 1 Cap by mouth at bedtime as needed for Sleep for up to 182 days. 30 Cap 5   • benazepril (LOTENSIN) 20 MG Tab Take 1 Tab by mouth every day. 30 Tab 0   • ezetimibe (ZETIA) 10 MG Tab Take 1 Tab by mouth every day. 90 Tab 3   • prasugrel (EFFIENT) 10 MG Tab Take 1 Tab by mouth every day. 90 Tab 3   • metoprolol SR (TOPROL XL) 50 MG TABLET SR 24 HR Take 1 Tab by mouth every day. 90 Tab 3   • Albuterol Sulfate (PROAIR RESPICLICK) 108 (90 BASE) MCG/ACT AEROSOL POWDER, BREATH ACTIVATED Inhale 2 Puffs by mouth as needed (for shortness of breath, wheezing.). every 4-6 hours as needed. 3 Each 3   • fluticasone-salmeterol (ADVAIR DISKUS) 500-50 MCG/DOSE AEROSOL POWDER, BREATH ACTIVATED Inhale 1 Puff by mouth every 12 hours. 3 Inhaler 3   • tiotropium (SPIRIVA HANDIHALER) 18 MCG Cap INHALE THE CONTENTS OF ONE CAPSULE DAILY 3 Cap 3   • tizanidine (ZANAFLEX) 4 MG Tab Take 4 mg by mouth every 6 hours as needed.     • Cranberry 500 MG Cap Take  by mouth.     • Zinc 50 MG Cap Take 50 mg by mouth every day.     • diphenhydrAMINE (BENADRYL) 25 MG Tab Take 25 mg by mouth every 6 hours as needed for Sleep.     • vitamin D (CHOLECALCIFEROL) 1000 UNIT Tab Take 1,000 Units by mouth every day.     • atorvastatin (LIPITOR) 80 MG tablet Take 1 Tab by mouth every day. 30 Tab 11   • levothyroxine (SYNTHROID) 75 MCG Tab Take 1 Tab by mouth every day. 90 Tab 3   • albuterol (VENTOLIN OR PROVENTIL) 108 (90 BASE) MCG/ACT Aero Soln inhalation aerosol Inhale 2 Puffs by mouth as needed. 8.5 g 3   • furosemide (LASIX) 20 MG TABS Take 1 Tab by mouth every day. 30 Tab 3   • potassium chloride SA (K-DUR) 10 MEQ TBCR Take 1 Tab by mouth every day. 30 Tab 3   •  hydrocodone-acetaminophen (NORCO) 5-325 MG TABS per tablet Take 1-2 Tabs by mouth every four hours as needed ((Pain Scale 1-3) Give 1 tablet first, may give second tablet after 1 hour if pain still unrelieved.). 120 Tab 0   • nitroglycerin (NITROSTAT) 0.4 MG SUBL Place 1 Tab under tongue as needed for Chest Pain. 25 Tab 3   • omeprazole (PRILOSEC) 40 MG delayed-release capsule 40 mg.     • atorvastatin (LIPITOR) 40 MG Tab 80 mg.     • benazepril (LOTENSIN) 5 MG Tab 5 mg.     • ezetimibe (ZETIA) 10 MG Tab 10 mg.     • furosemide (LASIX) 20 MG Tab 20 mg.     • levothyroxine (SYNTHROID) 75 MCG Tab Take 75 mcg by mouth daily.     • metoprolol SR (TOPROL XL) 50 MG TABLET SR 24 HR 50 mg.     • FLUARIX QUADRIVALENT 0.5 ML Suspension Prefilled Syringe injection inject 0.5 milliliter intramuscularly  0   • PREVNAR 13 syringe inject 0.5 milliliter intramuscularly  0   • PREVIDENT 5000 SENSITIVE 1.1-5 % Paste   0   • Melatonin 10 MG Tab Take  by mouth.     • KRILL OIL PO Take  by mouth.     • calcium polycarbophil (FIBERCON) 625 MG Tab Take 625 mg by mouth every day.     • Flaxseed, Linseed, (FLAXSEED OIL PO) Take  by mouth.     • aspirin EC (ECOTRIN) 81 MG TBEC Take 81 mg by mouth every day.     • tamsulosin (FLOMAX) 0.4 MG capsule Take 0.4 mg by mouth ONE-HALF HOUR AFTER BREAKFAST.     • doxazosin (CARDURA) 4 MG TABS Take 4 mg by mouth every day.       No current facility-administered medications for this visit.        PHYSICAL EXAM:  Appearance: Well-developed, well-nourished, no acute distress.  Eyes. PERRL.  Hearing: Grossly intact.  Oropharynx: Tongue normal, posterior pharynx without erythema or exudate.  Respiratory effort: No intercostal retractions or use of accessory muscles.  Lung auscultation: No crackles, wheezing.  Heart auscultation: No murmur, gallop, or rub. Regular rate and rhythm.  Extremities: No cyanosis or edema.  Gait and Station: Normal  Orientation: Oriented to time, place, and  person.    Assessment:  1. Central sleep apnea  DME CNOX BY DME CO   2. Insomnia, unspecified type  temazepam (RESTORIL) 15 MG Cap   3. Chronic obstructive pulmonary disease, unspecified COPD type (CMS-[facility])  AMB PULMONARY FUNCTION TEST/LAB         Plan:  1. Continue Advair 500/50 µg twice daily, Spiriva daily, and Ventolin as needed.  2. Continue temazepam 15 mg nightly. Patient has been informed that this is a habit forming an addictive medication. I have recommended minimal usage with drug holidays.  3. Continue ASV max pressure 25, EPAP 5/15, PS3/15.  4. Clean equipment weekly and replace supplies as allowed by insurance.  5. Pulmonary function tests and next visit in 6 months.  6. Follow-up with primary care regarding reflux.      Return in about 6 months (around 6/19/2018) for With PFT.

## 2017-12-19 NOTE — PATIENT INSTRUCTIONS
1. Continue Advair 500/50 µg twice daily, Spiriva daily, and Ventolin as needed.  2. Continue temazepam 15 mg nightly. Patient has been informed that this is a habit forming an addictive medication. I have recommended minimal usage with drug holidays.  3. Continue ASV max pressure 25, EPAP 5/15, PS3/15.  4. Clean equipment weekly and replace supplies as allowed by insurance.  5. Pulmonary function tests and next visit in 6 months.  6. Follow-up with primary care regarding reflux.

## 2017-12-27 ENCOUNTER — TELEPHONE (OUTPATIENT)
Dept: PULMONOLOGY | Facility: HOSPICE | Age: 62
End: 2017-12-27

## 2017-12-27 DIAGNOSIS — G47.33 OSA (OBSTRUCTIVE SLEEP APNEA): ICD-10-CM

## 2017-12-29 DIAGNOSIS — E78.5 HYPERLIPIDEMIA, UNSPECIFIED HYPERLIPIDEMIA TYPE: ICD-10-CM

## 2017-12-29 DIAGNOSIS — I21.3 ST ELEVATION MYOCARDIAL INFARCTION (STEMI), UNSPECIFIED ARTERY (HCC): ICD-10-CM

## 2017-12-29 DIAGNOSIS — I25.10 CORONARY ARTERY DISEASE INVOLVING NATIVE HEART WITHOUT ANGINA PECTORIS, UNSPECIFIED VESSEL OR LESION TYPE: ICD-10-CM

## 2017-12-29 DIAGNOSIS — I25.10 CORONARY ARTERY DISEASE DUE TO CALCIFIED CORONARY LESION: ICD-10-CM

## 2017-12-29 DIAGNOSIS — I25.84 CORONARY ARTERY DISEASE DUE TO CALCIFIED CORONARY LESION: ICD-10-CM

## 2017-12-29 RX ORDER — PRASUGREL 10 MG/1
10 TABLET, FILM COATED ORAL DAILY
Qty: 90 TAB | Refills: 2 | OUTPATIENT
Start: 2017-12-29 | End: 2018-01-18 | Stop reason: SDUPTHER

## 2017-12-29 RX ORDER — EZETIMIBE 10 MG/1
10 TABLET ORAL DAILY
Qty: 90 TAB | Refills: 1 | OUTPATIENT
Start: 2017-12-29 | End: 2018-01-18 | Stop reason: SDUPTHER

## 2017-12-29 RX ORDER — EZETIMIBE 10 MG/1
10 TABLET ORAL DAILY
Qty: 90 TAB | Refills: 2 | OUTPATIENT
Start: 2017-12-29

## 2018-01-02 NOTE — TELEPHONE ENCOUNTER
Called pt- [place] and feels  his O2 is much better and is wondering if he'd still need it down their. I explained that is a big possibility. He has spot checked himself with his own oximeter and he's in the high 90's. I have advised he get established with a new Pulmonary and Sleep Dr there but that could take awhile, so  to try and see a PCP now so he can retake his opo to see if O2 is still necessary until he's able to establish care with the specialist. Please advise

## 2018-01-18 DIAGNOSIS — I25.10 CORONARY ARTERY DISEASE DUE TO CALCIFIED CORONARY LESION: ICD-10-CM

## 2018-01-18 DIAGNOSIS — I25.10 CORONARY ARTERY DISEASE INVOLVING NATIVE HEART WITHOUT ANGINA PECTORIS, UNSPECIFIED VESSEL OR LESION TYPE: ICD-10-CM

## 2018-01-18 DIAGNOSIS — E78.5 HYPERLIPIDEMIA, UNSPECIFIED HYPERLIPIDEMIA TYPE: ICD-10-CM

## 2018-01-18 DIAGNOSIS — I21.3 ST ELEVATION MYOCARDIAL INFARCTION (STEMI), UNSPECIFIED ARTERY (HCC): ICD-10-CM

## 2018-01-18 DIAGNOSIS — I25.84 CORONARY ARTERY DISEASE DUE TO CALCIFIED CORONARY LESION: ICD-10-CM

## 2018-01-18 RX ORDER — NITROGLYCERIN 0.4 MG/1
0.4 TABLET SUBLINGUAL PRN
Qty: 25 TAB | Refills: 0 | Status: SHIPPED | OUTPATIENT
Start: 2018-01-18 | End: 2018-04-24 | Stop reason: SDUPTHER

## 2018-01-18 NOTE — TELEPHONE ENCOUNTER
patient is asking for a call back   Received: Today   Message Contents   Rosa Molina R.N.             TT/Denisse       Patient is asking to speak to you, he said he's very confused. He received a call saying he needs to make an appt but when he saw Dr. Purvis in June he was told to return in a year. He also said he is traveling right now and he needs about 10 refills, but he doesn't remember the names of the medications he's taking, and if they were all prescribed by Dr. Purvis or another physician. He can be reached at [phone].      Returned call to cell ph # x 2 with no answer, no ring tone, no VM.    Called home phone # and left VM message.
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
## 2018-01-19 RX ORDER — ATORVASTATIN CALCIUM 80 MG/1
80 TABLET, FILM COATED ORAL DAILY
Qty: 90 TAB | Refills: 1 | Status: SHIPPED | OUTPATIENT
Start: 2018-01-19 | End: 2018-01-24 | Stop reason: SDUPTHER

## 2018-01-19 RX ORDER — METOPROLOL SUCCINATE 50 MG/1
50 TABLET, EXTENDED RELEASE ORAL DAILY
Qty: 90 TAB | Refills: 1 | Status: SHIPPED | OUTPATIENT
Start: 2018-01-19 | End: 2018-01-24 | Stop reason: SDUPTHER

## 2018-01-19 RX ORDER — EZETIMIBE 10 MG/1
10 TABLET ORAL DAILY
Qty: 90 TAB | Refills: 1 | Status: SHIPPED | OUTPATIENT
Start: 2018-01-19 | End: 2018-01-24 | Stop reason: SDUPTHER

## 2018-01-19 RX ORDER — FUROSEMIDE 20 MG/1
20 TABLET ORAL DAILY
Qty: 90 TAB | Refills: 1 | Status: SHIPPED | OUTPATIENT
Start: 2018-01-19 | End: 2018-01-24 | Stop reason: SDUPTHER

## 2018-01-19 RX ORDER — DOXAZOSIN MESYLATE 4 MG/1
4 TABLET ORAL DAILY
Qty: 90 TAB | Refills: 1 | Status: SHIPPED | OUTPATIENT
Start: 2018-01-19 | End: 2018-01-24 | Stop reason: SDUPTHER

## 2018-01-19 RX ORDER — POTASSIUM CHLORIDE 750 MG/1
10 TABLET, EXTENDED RELEASE ORAL DAILY
Qty: 90 TAB | Refills: 1 | Status: SHIPPED | OUTPATIENT
Start: 2018-01-19 | End: 2018-01-24 | Stop reason: SDUPTHER

## 2018-01-19 RX ORDER — PRASUGREL 10 MG/1
10 TABLET, FILM COATED ORAL DAILY
Qty: 90 TAB | Refills: 1 | Status: SHIPPED | OUTPATIENT
Start: 2018-01-19 | End: 2018-01-24 | Stop reason: SDUPTHER

## 2018-01-24 DIAGNOSIS — I25.84 CORONARY ARTERY DISEASE DUE TO CALCIFIED CORONARY LESION: ICD-10-CM

## 2018-01-24 DIAGNOSIS — I25.10 CORONARY ARTERY DISEASE INVOLVING NATIVE HEART WITHOUT ANGINA PECTORIS, UNSPECIFIED VESSEL OR LESION TYPE: ICD-10-CM

## 2018-01-24 DIAGNOSIS — I25.10 CORONARY ARTERY DISEASE DUE TO CALCIFIED CORONARY LESION: ICD-10-CM

## 2018-01-24 DIAGNOSIS — I21.3 ST ELEVATION MYOCARDIAL INFARCTION (STEMI), UNSPECIFIED ARTERY (HCC): ICD-10-CM

## 2018-01-24 DIAGNOSIS — E78.5 HYPERLIPIDEMIA, UNSPECIFIED HYPERLIPIDEMIA TYPE: ICD-10-CM

## 2018-01-24 RX ORDER — ATORVASTATIN CALCIUM 80 MG/1
80 TABLET, FILM COATED ORAL DAILY
Qty: 90 TAB | Refills: 1 | Status: SHIPPED | OUTPATIENT
Start: 2018-01-24 | End: 2018-07-31 | Stop reason: SDUPTHER

## 2018-01-24 RX ORDER — EZETIMIBE 10 MG/1
10 TABLET ORAL DAILY
Qty: 90 TAB | Refills: 1 | Status: SHIPPED | OUTPATIENT
Start: 2018-01-24 | End: 2018-03-01 | Stop reason: SDUPTHER

## 2018-01-24 RX ORDER — FUROSEMIDE 20 MG/1
20 TABLET ORAL DAILY
Qty: 90 TAB | Refills: 1 | Status: SHIPPED | OUTPATIENT
Start: 2018-01-24 | End: 2018-07-31 | Stop reason: SDUPTHER

## 2018-01-24 RX ORDER — DOXAZOSIN MESYLATE 4 MG/1
4 TABLET ORAL DAILY
Qty: 90 TAB | Refills: 1 | Status: SHIPPED | OUTPATIENT
Start: 2018-01-24 | End: 2018-07-31 | Stop reason: SDUPTHER

## 2018-01-24 RX ORDER — POTASSIUM CHLORIDE 750 MG/1
10 TABLET, EXTENDED RELEASE ORAL DAILY
Qty: 90 TAB | Refills: 1 | Status: SHIPPED | OUTPATIENT
Start: 2018-01-24 | End: 2018-07-31 | Stop reason: SDUPTHER

## 2018-01-24 RX ORDER — METOPROLOL SUCCINATE 50 MG/1
50 TABLET, EXTENDED RELEASE ORAL DAILY
Qty: 90 TAB | Refills: 1 | Status: SHIPPED | OUTPATIENT
Start: 2018-01-24 | End: 2018-07-31 | Stop reason: SDUPTHER

## 2018-01-24 RX ORDER — PRASUGREL 10 MG/1
10 TABLET, FILM COATED ORAL DAILY
Qty: 90 TAB | Refills: 1 | Status: SHIPPED | OUTPATIENT
Start: 2018-01-24 | End: 2018-07-31 | Stop reason: SDUPTHER

## 2018-03-01 DIAGNOSIS — E78.5 HYPERLIPIDEMIA, UNSPECIFIED HYPERLIPIDEMIA TYPE: ICD-10-CM

## 2018-03-01 DIAGNOSIS — I25.10 CORONARY ARTERY DISEASE INVOLVING NATIVE HEART WITHOUT ANGINA PECTORIS, UNSPECIFIED VESSEL OR LESION TYPE: ICD-10-CM

## 2018-03-01 RX ORDER — EZETIMIBE 10 MG/1
10 TABLET ORAL DAILY
Qty: 90 TAB | Refills: 1 | Status: SHIPPED | OUTPATIENT
Start: 2018-03-01 | End: 2018-07-31 | Stop reason: SDUPTHER

## 2018-03-01 NOTE — TELEPHONE ENCOUNTER
Medication question   Received: Today   Message Contents   Bunny Prieto, Med Ass't  Denisse Molina R.N.             Hi Denisse,     TT pt would like to update his medication list with us as it seems to have changed and he wants to make sure they are [place]. He has also been having some issues getting some RX refilled and wanted to see if we can help to get things smoothed out. I did already send RX refill request to [facility].     Thanks,   Bunny [phone]      Pt called on mobile phone. No answer. LVM with contact information.

## 2018-04-09 DIAGNOSIS — J44.9 CHRONIC OBSTRUCTIVE PULMONARY DISEASE, UNSPECIFIED COPD TYPE (HCC): Chronic | ICD-10-CM

## 2018-04-10 RX ORDER — TIOTROPIUM BROMIDE 18 UG/1
CAPSULE ORAL; RESPIRATORY (INHALATION)
Qty: 90 CAP | Refills: 3 | Status: SHIPPED | OUTPATIENT
Start: 2018-04-10 | End: 2019-03-01 | Stop reason: SDUPTHER

## 2018-04-10 NOTE — TELEPHONE ENCOUNTER
Have we ever prescribed this med? Yes.  If yes, what date? 2/15/17    Last OV: 12/19/17    Next OV: 6/19/18    DX: Chronic obstructive pulmonary disease, unspecified COPD type (CMS-HCC) (J44.9)    Medications: Advair and Spiriva

## 2018-04-24 ENCOUNTER — TELEPHONE (OUTPATIENT)
Dept: CARDIOLOGY | Facility: MEDICAL CENTER | Age: 63
End: 2018-04-24

## 2018-04-24 DIAGNOSIS — R07.89 OTHER CHEST PAIN: Primary | ICD-10-CM

## 2018-04-25 RX ORDER — NITROGLYCERIN 0.4 MG/1
0.4 TABLET SUBLINGUAL PRN
Qty: 75 TAB | Refills: 3 | Status: SHIPPED | OUTPATIENT
Start: 2018-04-25 | End: 2018-07-09 | Stop reason: SDUPTHER

## 2018-05-16 ENCOUNTER — TELEPHONE (OUTPATIENT)
Dept: PULMONOLOGY | Facility: HOSPICE | Age: 63
End: 2018-05-16

## 2018-05-16 DIAGNOSIS — G47.31 CENTRAL SLEEP APNEA: ICD-10-CM

## 2018-05-16 NOTE — TELEPHONE ENCOUNTER
His oxygen was low almost the entire night. Changing pressure would not help given the severity and therefore I also agree that adding oxygen would be beneficial.

## 2018-05-16 NOTE — TELEPHONE ENCOUNTER
Patient had OPO in December of 2017, Noemí thought adding O2 was the next step but he would like to know if the pressure could be adjusted first to see if that helped. He has a follow up 06/19/18-aJswinder. Any advice before then? Thanks/ap

## 2018-05-17 NOTE — TELEPHONE ENCOUNTER
Patient left a voicemail after you left that he was just returning you call. Left a phone number of [phone].

## 2018-05-21 NOTE — TELEPHONE ENCOUNTER
Spoke to patient and he is onboard with the [facility], with his insurance does he need new testing?/ap

## 2018-05-21 NOTE — TELEPHONE ENCOUNTER
I would say yes if it was within 3 months but it is almost 6 months old so most likely not.  He has an appt in a few weeks so I would repeat the CNOX and document the need for the o2 at the follow up instead.  He declined the o2 in the past stating he was moving to [place] and no longer needed it a lower elevation.

## 2018-05-22 NOTE — TELEPHONE ENCOUNTER
He uses [facility]. Can you please send it ans see if they will set up? If they won't let me know and I'll call the patient/ap

## 2018-05-28 DIAGNOSIS — G47.00 INSOMNIA, UNSPECIFIED TYPE: ICD-10-CM

## 2018-05-29 RX ORDER — TEMAZEPAM 15 MG/1
CAPSULE ORAL
Qty: 30 CAP | Refills: 2 | Status: SHIPPED
Start: 2018-05-29 | End: 2018-06-28

## 2018-05-29 NOTE — TELEPHONE ENCOUNTER
Have we ever prescribed this med? Yes.  If yes, what date? 12/19/17    Last OV: 12/19/17    Next OV: 6/19/18    DX: Insomnia, unspecified type (G47.00)    Medications: temazepam (RESTORIL) 15 MG Cap

## 2018-06-01 NOTE — TELEPHONE ENCOUNTER
As discussed, please contact the patient and give him the options, so he can help make the determination if he wants to wait or redo testing now./ap

## 2018-06-01 NOTE — TELEPHONE ENCOUNTER
I am still working with Dima at [facility].  The first response I received was that the patient was going to have to have a titration study in order to get the oxygen.  He also stated they would rent the concentrator @ $110 a month if he wanted to start right away.  I responded back questioning the response since this patients insurance does accept CNOX as qualifying testing and advised I was only asking if they would submit for auth with notes and testing from [facility].  I have not received a response from that.

## 2018-06-05 NOTE — TELEPHONE ENCOUNTER
[facility] is declining to accept the old testing    Please sign for CNOX on PAP and room air to be completed before his OV 6/19

## 2018-06-06 NOTE — TELEPHONE ENCOUNTER
Faxed to [facility] for patient that we will repeat the CNOX and [facility] will call him to schedule before his OV

## 2018-06-18 ENCOUNTER — TELEPHONE (OUTPATIENT)
Dept: SLEEP MEDICINE | Facility: MEDICAL CENTER | Age: 63
End: 2018-06-18

## 2018-06-18 NOTE — TELEPHONE ENCOUNTER
Norberto wants to know if we can give him Cnox results over the phone, he doesn't want to come in for results if he doesn't have to he is already having to do the PFT at our other office.    Are we able to give him results? Pt just wants to know if he needs 02. Please advise

## 2018-06-18 NOTE — TELEPHONE ENCOUNTER
Called Norberto and relayed Casey's last message.  Pt will schedule a f/v at pulmonary at time of PFT visit tomorrow . Thank you

## 2018-06-18 NOTE — TELEPHONE ENCOUNTER
Overnight oximetry 6/13 indicates a mean 02 saturation of 90.3%, He spent 21% of the study with saturations less than 90%. However only 3.4% of the study with saturations less than 88%. No need to add 02 bleed in at this time. We can discuss at follow up. He will need a Pulmonary follow up scheduled after his PFT's.

## 2018-06-19 ENCOUNTER — NON-PROVIDER VISIT (OUTPATIENT)
Dept: PULMONOLOGY | Facility: HOSPICE | Age: 63
End: 2018-06-19
Payer: COMMERCIAL

## 2018-06-19 ENCOUNTER — APPOINTMENT (OUTPATIENT)
Dept: SLEEP MEDICINE | Facility: MEDICAL CENTER | Age: 63
End: 2018-06-19
Payer: COMMERCIAL

## 2018-06-19 VITALS — BODY MASS INDEX: 37.29 KG/M2 | WEIGHT: 275 LBS

## 2018-06-19 DIAGNOSIS — J44.9 CHRONIC OBSTRUCTIVE PULMONARY DISEASE, UNSPECIFIED COPD TYPE (HCC): Chronic | ICD-10-CM

## 2018-06-19 PROCEDURE — 94060 EVALUATION OF WHEEZING: CPT | Performed by: INTERNAL MEDICINE

## 2018-06-19 PROCEDURE — 94729 DIFFUSING CAPACITY: CPT | Performed by: INTERNAL MEDICINE

## 2018-06-19 PROCEDURE — 94726 PLETHYSMOGRAPHY LUNG VOLUMES: CPT | Performed by: INTERNAL MEDICINE

## 2018-06-19 ASSESSMENT — PULMONARY FUNCTION TESTS
FVC: 3.73
FEV1_LLN: 3.17
FEV1/FVC: 63
FVC: 4
FEV1_PERCENT_PREDICTED: 68
FEV1/FVC_PERCENT_PREDICTED: 84
FEV1/FVC_PERCENT_PREDICTED: 75
FVC_LLN: 4.21
FEV1_PERCENT_PREDICTED: 62
FVC_PERCENT_PREDICTED: 74
FEV1/FVC_PERCENT_LLN: 63
FEV1/FVC_PERCENT_PREDICTED: 86
FEV1/FVC_PREDICTED: 75
FEV1/FVC: 65
FEV1: 2.6
FVC_PREDICTED: 5.04
FEV1/FVC_PERCENT_CHANGE: 143
FEV1/FVC_PERCENT_CHANGE: 2
FEV1: 2.36
FEV1/FVC: 65
FEV1_PERCENT_CHANGE: 10
FEV1_PERCENT_CHANGE: 7
FVC_PERCENT_PREDICTED: 79
FEV1/FVC_PERCENT_PREDICTED: 86
FEV1/FVC: 63
FEV1/FVC_PERCENT_PREDICTED: 84
FEV1_PREDICTED: 3.8

## 2018-06-19 NOTE — PROCEDURES
Technician: VIRIDIANA Calvillo    Technician Comment:  Good patient effort & cooperation.  The results of this test meet the ATS/ERS standards for acceptability & reproducibility.  Test was performed on the ArthroCAD Body Plethysmograph-Elite DX system.  Predicted values were [facility]-3 for spirometry, [facility] for DLCO, ITS for Lung Volumes.  The DLCO was uncorrected for Hgb.  A bronchodilator of Ventolin HFA -2puffs via spacer administered.  DLCO performed during dilation period.    Interpretation:    SPIROMETRY:  1. FVC was 4.00 L, 79 % of predicted  2. FEV1 was 2.60 L, 68 % of predicted   3. FEV1/FVC ratio was 65 %  4. There was borderline  response to bronchodilators   5. Flow volume loop was consistent with obstruction    LUNG VOLUMES:  1. RV was 175 % of predicted   2. TLC was 119 % of predicted       DIFFUSION CAPACITY:  1.Diffusion capacity was 120% of predicted       IMPRESSION:  The patient has moderate obstructive lung disease. This is consistent with the patient listed diagnosis of COPD. Clinical correlation is required.

## 2018-07-09 ENCOUNTER — OFFICE VISIT (OUTPATIENT)
Dept: PULMONOLOGY | Facility: HOSPICE | Age: 63
End: 2018-07-09
Payer: COMMERCIAL

## 2018-07-09 ENCOUNTER — TELEPHONE (OUTPATIENT)
Dept: CARDIOLOGY | Facility: MEDICAL CENTER | Age: 63
End: 2018-07-09

## 2018-07-09 ENCOUNTER — OFFICE VISIT (OUTPATIENT)
Dept: CARDIOLOGY | Facility: MEDICAL CENTER | Age: 63
End: 2018-07-09
Payer: COMMERCIAL

## 2018-07-09 VITALS
BODY MASS INDEX: 37.25 KG/M2 | SYSTOLIC BLOOD PRESSURE: 110 MMHG | HEART RATE: 60 BPM | WEIGHT: 275 LBS | HEIGHT: 72 IN | DIASTOLIC BLOOD PRESSURE: 66 MMHG | OXYGEN SATURATION: 94 %

## 2018-07-09 VITALS
SYSTOLIC BLOOD PRESSURE: 112 MMHG | DIASTOLIC BLOOD PRESSURE: 68 MMHG | WEIGHT: 278 LBS | HEIGHT: 72 IN | BODY MASS INDEX: 37.65 KG/M2 | OXYGEN SATURATION: 94 % | TEMPERATURE: 97.7 F | HEART RATE: 61 BPM | RESPIRATION RATE: 18 BRPM

## 2018-07-09 DIAGNOSIS — J44.9 CHRONIC OBSTRUCTIVE PULMONARY DISEASE, UNSPECIFIED COPD TYPE (HCC): Chronic | ICD-10-CM

## 2018-07-09 DIAGNOSIS — I25.10 CORONARY ARTERY DISEASE INVOLVING NATIVE CORONARY ARTERY OF NATIVE HEART WITHOUT ANGINA PECTORIS: ICD-10-CM

## 2018-07-09 DIAGNOSIS — I10 ESSENTIAL HYPERTENSION, BENIGN: ICD-10-CM

## 2018-07-09 DIAGNOSIS — I50.30 DIASTOLIC HEART FAILURE, UNSPECIFIED HF CHRONICITY (HCC): ICD-10-CM

## 2018-07-09 DIAGNOSIS — R06.81 CENTRAL APNEA: ICD-10-CM

## 2018-07-09 DIAGNOSIS — R07.89 OTHER CHEST PAIN: ICD-10-CM

## 2018-07-09 DIAGNOSIS — I50.20 SYSTOLIC HEART FAILURE SECONDARY TO CORONARY ARTERY DISEASE (HCC): ICD-10-CM

## 2018-07-09 DIAGNOSIS — R09.02 HYPOXIA: ICD-10-CM

## 2018-07-09 DIAGNOSIS — G47.31 CENTRAL SLEEP APNEA: ICD-10-CM

## 2018-07-09 DIAGNOSIS — I25.10 SYSTOLIC HEART FAILURE SECONDARY TO CORONARY ARTERY DISEASE (HCC): ICD-10-CM

## 2018-07-09 PROCEDURE — 99214 OFFICE O/P EST MOD 30 MIN: CPT | Performed by: NURSE PRACTITIONER

## 2018-07-09 RX ORDER — NITROGLYCERIN 0.4 MG/1
0.4 TABLET SUBLINGUAL PRN
Qty: 75 TAB | Refills: 3 | Status: SHIPPED | OUTPATIENT
Start: 2018-07-09 | End: 2018-07-31 | Stop reason: SDUPTHER

## 2018-07-09 ASSESSMENT — ENCOUNTER SYMPTOMS
DIZZINESS: 0
SPUTUM PRODUCTION: 0
PALPITATIONS: 0
VOMITING: 0
HEMOPTYSIS: 0
ORTHOPNEA: 0
CLAUDICATION: 0
WEAKNESS: 0
WHEEZING: 0
PND: 0
NAUSEA: 0
COUGH: 0
SHORTNESS OF BREATH: 1

## 2018-07-09 NOTE — PATIENT INSTRUCTIONS
1) Continue ASV at EPAP and change 7/15, PS3/15 with max pressure of [id number]. Pending results of CNOX on updated EPAP setting, will start 2L of oxygen. - call for results  2) Clean mask and supplies weekly and change them as insurance allows  3) Continue Advair 500/50, Spiriva, rescue inhaler  4) Encourage light conditioning  5) Vaccines: Up to date with Pneumovax 23.   6) Return in about 6 months (around 1/9/2019) for Compliance, review of symptoms, if not sooner, follow up with [facility].

## 2018-07-09 NOTE — PROGRESS NOTES
Chief Complaint   Patient presents with   • Hypertension   • Coronary Artery Disease       Subjective:   Norberto White is a 63 y.o. male who presents today for follow up CAD.    He is patient of Dr. Purvis in our clinic, HX: ICM (EF of 45%), HTN, HLP, CAD s/p CABG 2015 with pleural effusion and thoracentesis, paroxysmal atrial fibrillation, ADRYAN with cpap. Recent PCI in [place], [place].     Patient is doing well.  He noticed that the elevation has been affecting his breathing.  He spends the winter season in the [place].  With the elevation changes in [place] he is able to exercise every day and has lost 20 pounds.  He is able to walk 2 miles a day without getting short of breath, but in [place] he has been getting more short of breath from his COPD.    He has had no episodes of chest pain, palpitations, dizziness/lightheadedness, or orthopnea.    Sleep apnea: Following up with pulmonary and titrating his BiPAP    CAD: Denies any chest pain or palpitation.  Tolerating his medication and has not needed to use any nitroglycerin.    Heart failure: Has chronic lower extremity edema.  Otherwise euvolemic.      Past Medical History:   Diagnosis Date   • Acute MI, inferolateral wall, initial episode of care ([facility]) 12/23/2013   • Arthritis    • ASTHMA     inhaler daily   • Back pain    • Breath shortness 2013 24/7   • CAD (coronary artery disease) 2/13/2014    Bare-metal stent to the circumflex in December 2013. Prior stent to the LAD diagonal had mild in-stent restenosis. 30% disease is noted in the right coronary artery.   • Chronic airway obstruction, not elsewhere classified    • EMPHYSEMA    • Essential hypertension, benign 2/13/2014   • HLD (hyperlipidemia) 2/13/2014   • Hypertension 2013    states well controlled on meds   • Infectious disease     10/5/14 daughter and granddaughter had colds   • ADRYAN on CPAP 6/28/2017   • Other and unspecified angina pectoris    • PAF (paroxysmal atrial fibrillation) ([facility])  7/26/2015   • Pain 2013    6/10   • Pain 2014    4/10 neck   • Pneumonia    • Supplemental oxygen dependent     2.5 liters at night   • Unspecified disorder of thyroid 2010    on synthroid   • Unspecified hemorrhagic conditions     bruises easily related to plavix     Past Surgical History:   Procedure Laterality Date   • MULTIPLE CORONARY ARTERY BYPASS ENDO VEIN HARVEST N/A 7/23/2015    Procedure: MULTIPLE CORONARY ARTERY BYPASS ENDO VEIN HARVEST;  Surgeon: Deuce Tam M.D.;  Location: SURGERY [facility];  Service:    • CERVICAL DISK AND FUSION ANTERIOR  10/13/2014    Performed by Moises Kerns M.D. at SURGERY [facility]   • OTHER ORTHOPEDIC SURGERY  2012    right knee   • OTHER CARDIAC SURGERY  2008    cardiac stents   • GYN SURGERY       Family History   Problem Relation Age of Onset   • Heart Attack Father    • Cancer Mother      Social History     Social History   • Marital status:      Spouse name: N/A   • Number of children: N/A   • Years of education: N/A     Occupational History   • Not on file.     Social History Main Topics   • Smoking status: Former Smoker     Packs/day: 2.00     Years: 20.00     Types: Cigarettes, Cigars     Quit date: 10/7/1999   • Smokeless tobacco: Never Used   • Alcohol use No   • Drug use: No   • Sexual activity: Not on file     Other Topics Concern   • Not on file     Social History Narrative   • No narrative on file     Allergies   Allergen Reactions   • Morphine Anxiety and Unspecified     Pt becomes irritable  Agitation; hot flashes.     Outpatient Encounter Prescriptions as of 7/9/2018   Medication Sig Dispense Refill   • nitroglycerin (NITROSTAT) 0.4 MG SL Tab Place 1 Tab under tongue as needed for Chest Pain (Place 1 tablet under the tongue every 5 minutes up to 3 doses as needed for chest pain). 75 Tab 3   • FLUARIX QUADRIVALENT 0.5 ML Suspension Prefilled Syringe injection inject 0.5 milliliter intramuscularly  0   • PREVNAR 13 syringe inject 0.5  milliliter intramuscularly  0   • PREVIDENT 5000 SENSITIVE 1.1-5 % Paste   0   • benazepril (LOTENSIN) 20 MG Tab Take 1 Tab by mouth every day. 90 Tab 1   • [DISCONTINUED] nitroglycerin (NITROSTAT) 0.4 MG SL Tab Place 1 Tab under tongue as needed for Chest Pain (Place 1 tablet under the tongue every 5 minutes up to 3 doses as needed for chest pain). 75 Tab 3   • SPIRIVA HANDIHALER 18 MCG Cap INHALE THE CONTENTS OF 1  CAPSULE VIA HANDIHALER  DAILY 90 Cap 3   • ADVAIR DISKUS 500-50 MCG/DOSE AEROSOL POWDER, BREATH ACTIVATED USE 1 PUFF EVERY 12 HOURS 3 Inhaler 3   • ezetimibe (ZETIA) 10 MG Tab Take 1 Tab by mouth every day. 90 Tab 1   • atorvastatin (LIPITOR) 80 MG tablet Take 1 Tab by mouth every day. 90 Tab 1   • doxazosin (CARDURA) 4 MG Tab Take 1 Tab by mouth every day. 90 Tab 1   • furosemide (LASIX) 20 MG Tab Take 1 Tab by mouth every day. 90 Tab 1   • metoprolol SR (TOPROL XL) 50 MG TABLET SR 24 HR Take 1 Tab by mouth every day. 90 Tab 1   • potassium chloride SA (K-DUR) 10 MEQ Tab CR Take 1 Tab by mouth every day. 90 Tab 1   • prasugrel (EFFIENT) 10 MG Tab Take 1 Tab by mouth every day. 90 Tab 1   • omeprazole (PRILOSEC) 40 MG delayed-release capsule 40 mg.     • levothyroxine (SYNTHROID) 75 MCG Tab Take 75 mcg by mouth daily.     • [DISCONTINUED] tamsulosin (FLOMAX) 0.4 MG capsule 0.4 mg.     • Albuterol Sulfate (PROAIR RESPICLICK) 108 (90 BASE) MCG/ACT AEROSOL POWDER, BREATH ACTIVATED Inhale 2 Puffs by mouth as needed (for shortness of breath, wheezing.). every 4-6 hours as needed. 3 Each 3   • tizanidine (ZANAFLEX) 4 MG Tab Take 4 mg by mouth every 6 hours as needed.     • Melatonin 10 MG Tab Take  by mouth.     • Cranberry 500 MG Cap Take  by mouth.     • Zinc 50 MG Cap Take 50 mg by mouth every day.     • diphenhydrAMINE (BENADRYL) 25 MG Tab Take 25 mg by mouth every 6 hours as needed for Sleep.     • KRILL OIL PO Take  by mouth.     • calcium polycarbophil (FIBERCON) 625 MG Tab Take 625 mg by mouth every  day.     • vitamin D (CHOLECALCIFEROL) 1000 UNIT Tab Take 1,000 Units by mouth every day.     • Flaxseed, Linseed, (FLAXSEED OIL PO) Take  by mouth.     • [DISCONTINUED] albuterol (VENTOLIN OR PROVENTIL) 108 (90 BASE) MCG/ACT Aero Soln inhalation aerosol Inhale 2 Puffs by mouth as needed. (Patient not taking: Reported on 7/9/2018) 8.5 g 3   • hydrocodone-acetaminophen (NORCO) 5-325 MG TABS per tablet Take 1-2 Tabs by mouth every four hours as needed ((Pain Scale 1-3) Give 1 tablet first, may give second tablet after 1 hour if pain still unrelieved.). 120 Tab 0   • aspirin EC (ECOTRIN) 81 MG TBEC Take 81 mg by mouth every day.     • tamsulosin (FLOMAX) 0.4 MG capsule Take 0.4 mg by mouth ONE-HALF HOUR AFTER BREAKFAST.       No facility-administered encounter medications on file as of 7/9/2018.      Review of Systems   Constitutional: Negative for malaise/fatigue.   Respiratory: Positive for shortness of breath. Negative for cough, hemoptysis, sputum production and wheezing.    Cardiovascular: Positive for leg swelling. Negative for chest pain, palpitations, orthopnea, claudication and PND.   Gastrointestinal: Negative for nausea and vomiting.   Neurological: Negative for dizziness and weakness.        Objective:   /66   Pulse 60   Ht 1.829 m (6')   Wt 124.7 kg (275 lb)   SpO2 94%   BMI 37.30 kg/m²     Physical Exam   Constitutional: He is oriented to person, place, and time. He appears well-developed and well-nourished. No distress.   HENT:   Head: Normocephalic and atraumatic.   Eyes: Pupils are equal, round, and reactive to light.   Neck: No JVD present.   Cardiovascular: Normal rate, regular rhythm and normal heart sounds.    Sternal incision well healed    Pulmonary/Chest: Effort normal and breath sounds normal.   Abdominal: Soft. Bowel sounds are normal. He exhibits no distension.   Musculoskeletal: He exhibits edema (1+ pitting edema bilateral ).   Neurological: He is alert and oriented to person,  place, and time.   Skin: Skin is warm and dry. He is not diaphoretic.   Psychiatric: He has a normal mood and affect. His behavior is normal. Judgment and thought content normal.           Echocardiography Laboratory  6/14/2016  Left ventricular ejection fraction is visually estimated to be 50%.   Grade II diastolic dysfunction.   Poor endocardial definition difficult to assess wall motion.  Trace mitral regurgitation.  Right ventricular systolic pressure is estimated to be 35 mmHg.  No pericardial effusion seen.    Labs  8/30/2017  BUN 25  Creatinine 1.1  Sodium 140   potassium 4.5  Cholesterol 123   HDL 33  LDL 68   Triglyceride 111      Assessment:     1. Coronary artery disease involving native coronary artery of native heart without angina pectoris  nitroglycerin (NITROSTAT) 0.4 MG SL Tab    COMP METABOLIC PANEL    LIPID PROFILE    07/23/2015 was coronary bypass grafting x2 with left internal mammary artery to the distal diagonal artery,   reverse saphenous vein graft to the distal PDA   2. Systolic heart failure secondary to coronary artery disease (HCC)  nitroglycerin (NITROSTAT) 0.4 MG SL Tab    COMP METABOLIC PANEL    LIPID PROFILE   3. Diastolic heart failure, unspecified HF chronicity (HCC)  nitroglycerin (NITROSTAT) 0.4 MG SL Tab    COMP METABOLIC PANEL    LIPID PROFILE   4. Essential hypertension, benign     5. Other chest pain  nitroglycerin (NITROSTAT) 0.4 MG SL Tab   6. Central sleep apnea         Medical Decision Making:  Today's Assessment / Status / Plan:     1. CAD:  - denies any chest pain   -Continue aspirin, effient 10mg qd, atorvastatin 80, benazepril 20 mg, And metoprolol XL 50 mg  - nitroglycerin as needed    2. Systolic and diastolic congestive heart failure stage c, class II:  - continue furosemide 20mg qd and potassium  - SOB combination of heart failure and COPD    3. Hypertension:  - stable   - continue current regiment     4. Sleep apnea:  - collaborating with pulmonary   - continue  bipap    5. Hyperlipidemia:  - LDL 68, goal less than 70  - continue atorvastatin 80mg qd   - CMP and Lipid profile in 6 months    Follow up in 6 months with Dr. Cavazos per patient preference at [place], [place].    Collaborating Provider: Dr. Underwood

## 2018-07-09 NOTE — PROGRESS NOTES
CC:  Here for f/u sleep and pulmonary issues as listed below    HPI:   Norberto presents today for follow up for COPD and ADRYAN. PFTs from 6/2018 are improved in comparison to 2016, but he did take medication prior to testing, and indicate a Fev1 of 2.36L or 62% predicted with a moderate bronchodilator response, Fev1/FVC ratio of 63, DLCO 120%.  He is a former smoker, 40-pack-year history, quitting in 1999.  Last chest x-ray from 2016 showed no evidence of acute cardiopulmonary process with stable mildly enlarged cardiac silhouette. Patient is compliant with Advair 500/50 µg twice daily with reminder to rinse mouth, Spiriva daily and Ventolin 1x/week.  He finds his dyspnea improved since last OV. He has lost weight which helped with the dyspnea. He feels better at lower altitudes and able to walk easier. He admits he is not as active being at home at a higher elevation in comparison to when he is at a lower elevation.    PSG from 2012 indicated an AHI of 14.7 and low oxygenation of 85%.  Currently he is being treated with ASV @ EPAP 5/15, PS3/15 with max pressure of [id number].  CNOX from 6/2018 showed time below 89% of 17 minutes, mean oxygenation of 90.3%, low oxygenation of 87%.  Compliance download from the dates 4/11/2018 - 5/10/2018 indicates he is wearing the device 100% for an avg of 8 hours and 25 minutes per night with a reduced AHI of 4.4.  Average EPAP pressure of 5.1 with average pressure support of 3.4.  He does tolerate pressure and mask well.  He does not wake up refreshed like he used to and and is more tired throughout the day. He denies naps.  He denies morning H/A and sleep better overall. He takes Temazepam nightly for many years. We discussed holidays of the medication. He is not interested in insomnia specialist. He admits he has never been able to shut off his mind. He will continue to clean supplies weekly and change them as insurance allows.       Patient Active Problem List    Diagnosis Date Noted    • STEMI (ST elevation myocardial infarction) ([facility]) 07/23/2015     Priority: High   • Cardiomyopathy, ischemic 07/25/2015     Priority: Medium   • Coronary artery disease involving native coronary artery of native heart without angina pectoris 07/09/2018   • Central apnea 07/09/2018   • BMI 37.0-37.9, adult 07/09/2018   • Central sleep apnea 06/28/2017   • Hypoxemia 12/14/2016   • Hypoxia 10/23/2015   • PAF (paroxysmal atrial fibrillation) ([facility]) 07/26/2015   • COPD (chronic obstructive pulmonary disease) ([facility]) 07/24/2015   • Hypothyroid 07/24/2015   • Thrombocytopenia ([facility]) 07/24/2015   • Systolic heart failure secondary to coronary artery disease ([facility]) 07/24/2015   • Diastolic heart failure ([facility]) 07/24/2015   • Cervical disc disease 10/13/2014   • Coronary artery disease due to calcified coronary lesion 02/13/2014   • Essential hypertension, benign 02/13/2014   • HLD (hyperlipidemia) 02/13/2014   • Acute MI, inferolateral wall, initial episode of care ([facility]) 12/23/2013       Past Medical History:   Diagnosis Date   • Acute MI, inferolateral wall, initial episode of care ([facility]) 12/23/2013   • Arthritis    • ASTHMA     inhaler daily   • Back pain    • Breath shortness 2013 24/7   • CAD (coronary artery disease) 2/13/2014    Bare-metal stent to the circumflex in December 2013. Prior stent to the LAD diagonal had mild in-stent restenosis. 30% disease is noted in the right coronary artery.   • Chronic airway obstruction, not elsewhere classified    • EMPHYSEMA    • Essential hypertension, benign 2/13/2014   • HLD (hyperlipidemia) 2/13/2014   • Hypertension 2013    states well controlled on meds   • Infectious disease     10/5/14 daughter and granddaughter had colds   • ADRYAN on CPAP 6/28/2017   • Other and unspecified angina pectoris    • PAF (paroxysmal atrial fibrillation) ([facility]) 7/26/2015   • Pain 2013    6/10   • Pain 2014    4/10 neck   • Pneumonia    • Supplemental oxygen dependent     2.5 liters at night   •  Unspecified disorder of thyroid 2010    on synthroid   • Unspecified hemorrhagic conditions     bruises easily related to plavix       Past Surgical History:   Procedure Laterality Date   • MULTIPLE CORONARY ARTERY BYPASS ENDO VEIN HARVEST N/A 7/23/2015    Procedure: MULTIPLE CORONARY ARTERY BYPASS ENDO VEIN HARVEST;  Surgeon: Deuce Tam M.D.;  Location: SURGERY [facility];  Service:    • CERVICAL DISK AND FUSION ANTERIOR  10/13/2014    Performed by Moises Kerns M.D. at SURGERY [facility]   • OTHER ORTHOPEDIC SURGERY  2012    right knee   • OTHER CARDIAC SURGERY  2008    cardiac stents   • GYN SURGERY         Family History   Problem Relation Age of Onset   • Heart Attack Father    • Cancer Mother        Social History     Social History   • Marital status:      Spouse name: N/A   • Number of children: N/A   • Years of education: N/A     Occupational History   • Not on file.     Social History Main Topics   • Smoking status: Former Smoker     Packs/day: 2.00     Years: 20.00     Types: Cigarettes, Cigars     Quit date: 10/7/1999   • Smokeless tobacco: Never Used   • Alcohol use No   • Drug use: No   • Sexual activity: Not on file     Other Topics Concern   • Not on file     Social History Narrative   • No narrative on file       Current Outpatient Prescriptions   Medication Sig Dispense Refill   • benazepril (LOTENSIN) 20 MG Tab Take 1 Tab by mouth every day. 90 Tab 1   • SPIRIVA HANDIHALER 18 MCG Cap INHALE THE CONTENTS OF 1  CAPSULE VIA HANDIHALER  DAILY 90 Cap 3   • ADVAIR DISKUS 500-50 MCG/DOSE AEROSOL POWDER, BREATH ACTIVATED USE 1 PUFF EVERY 12 HOURS 3 Inhaler 3   • ezetimibe (ZETIA) 10 MG Tab Take 1 Tab by mouth every day. 90 Tab 1   • atorvastatin (LIPITOR) 80 MG tablet Take 1 Tab by mouth every day. 90 Tab 1   • doxazosin (CARDURA) 4 MG Tab Take 1 Tab by mouth every day. 90 Tab 1   • furosemide (LASIX) 20 MG Tab Take 1 Tab by mouth every day. 90 Tab 1   • metoprolol SR (TOPROL XL)  50 MG TABLET SR 24 HR Take 1 Tab by mouth every day. 90 Tab 1   • potassium chloride SA (K-DUR) 10 MEQ Tab CR Take 1 Tab by mouth every day. 90 Tab 1   • prasugrel (EFFIENT) 10 MG Tab Take 1 Tab by mouth every day. 90 Tab 1   • omeprazole (PRILOSEC) 40 MG delayed-release capsule 40 mg.     • levothyroxine (SYNTHROID) 75 MCG Tab Take 75 mcg by mouth daily.     • tizanidine (ZANAFLEX) 4 MG Tab Take 4 mg by mouth every 6 hours as needed.     • Melatonin 10 MG Tab Take  by mouth.     • Cranberry 500 MG Cap Take  by mouth.     • Zinc 50 MG Cap Take 50 mg by mouth every day.     • diphenhydrAMINE (BENADRYL) 25 MG Tab Take 25 mg by mouth every 6 hours as needed for Sleep.     • KRILL OIL PO Take  by mouth.     • calcium polycarbophil (FIBERCON) 625 MG Tab Take 625 mg by mouth every day.     • vitamin D (CHOLECALCIFEROL) 1000 UNIT Tab Take 1,000 Units by mouth every day.     • Flaxseed, Linseed, (FLAXSEED OIL PO) Take  by mouth.     • hydrocodone-acetaminophen (NORCO) 5-325 MG TABS per tablet Take 1-2 Tabs by mouth every four hours as needed ((Pain Scale 1-3) Give 1 tablet first, may give second tablet after 1 hour if pain still unrelieved.). 120 Tab 0   • aspirin EC (ECOTRIN) 81 MG TBEC Take 81 mg by mouth every day.     • tamsulosin (FLOMAX) 0.4 MG capsule Take 0.4 mg by mouth ONE-HALF HOUR AFTER BREAKFAST.     • nitroglycerin (NITROSTAT) 0.4 MG SL Tab Place 1 Tab under tongue as needed for Chest Pain (Place 1 tablet under the tongue every 5 minutes up to 3 doses as needed for chest pain). 75 Tab 3   • tamsulosin (FLOMAX) 0.4 MG capsule 0.4 mg.     • FLUARIX QUADRIVALENT 0.5 ML Suspension Prefilled Syringe injection inject 0.5 milliliter intramuscularly  0   • PREVNAR 13 syringe inject 0.5 milliliter intramuscularly  0   • PREVIDENT 5000 SENSITIVE 1.1-5 % Paste   0   • Albuterol Sulfate (PROAIR RESPICLICK) 108 (90 BASE) MCG/ACT AEROSOL POWDER, BREATH ACTIVATED Inhale 2 Puffs by mouth as needed (for shortness of breath,  wheezing.). every 4-6 hours as needed. 3 Each 3   • albuterol (VENTOLIN OR PROVENTIL) 108 (90 BASE) MCG/ACT Aero Soln inhalation aerosol Inhale 2 Puffs by mouth as needed. (Patient not taking: Reported on 7/9/2018) 8.5 g 3     No current facility-administered medications for this visit.           Allergies: Morphine      ROS   Gen: Denies fever, chills, unintentional weight loss, fatigue, night sweats  E/N/T: Denies ear pain, nasal congestion  Resp:Denies wheezing, cough, sputum production, hemoptysis  CV: Denies chest pain, chest tightness, palpitations, BLE edema  Sleep:Denies morning headache, insomnia, daytime somnolence, snoring, gasping for air, apnea  Neuro: Denies frequent headaches, weakness, dizziness  GI: Denies N/V, acid reflux/heartburn  See HPI.  All other systems reviewed and negative      Vital signs for this encounter:  Vitals:    07/09/18 1101   Height: 1.829 m (6')   Weight: (!) 126.1 kg (278 lb)   Weight % change since last entry.: 0 %   BP: 112/68   Pulse: 61   BMI (Calculated): 37.7   Resp: 18   Temp: 36.5 °C (97.7 °F)   O2 sat % room air: 94 %                 Physical Exam:   Appearance: well developed, well nourished, no acute distress.  Eyes: PERRL, EOM intact, sclere white, conjunctiva moist.  Ears: no lesions or deformities.  Hearing: grossly intact.  Nose: no lesions or deformities.  Dentition: good dentition.  Oropharynx: tongue normal, posterior pharynx without erythema or exudate.  Neck: supple, trachea midline, no masses.  Respiratory effort: no intercostal retractions or use of accessory muscles.  Lung auscultation: Bilateral diminished   Heart auscultation: no murmur, rub, or gallop.   Extremities: no cyanosis or edema.  Abdomen: soft, non-tender, no masses.  Gait and station: grossly normal   Digits and Nails: no clubbing, cyanosis, petechiae, or nodes.  Cranial nerves: grossly normal.  Motor: no focal deficits observed.  Skin: no rashes, lesions, or ulcers noted.  Orientation:  oriented to time, place, and person.  Mood and affect: mood and affect appropriate, normal interaction with examiner.    Assessment   1. Central apnea  DME OTHER    DME CNOX BY DME CO    CANCELED: DME O2 NEW SET UP   2. Hypoxia     3. Chronic obstructive pulmonary disease, unspecified COPD type (HCC)     4. Central sleep apnea     5. BMI 37.0-37.9, adult  OBESITY COUNSELING (No Charge): Patient identified as having weight management issue.  Appropriate orders and counseling given.       Patient was seen for 30 minutes, more than 50% of time spent in face to face review, counseling, and arranging future evaluation and follow up of medical conditions and care related to asthma, weight management, use of medication and ADRYAN, CNOX results, discussion for need of oxygen. Patient is clinically stable and will proceed with following plan.  He does qualify for nocturnal oxygen given the most up-to-date CNOX results, however patient would like to start with increasing his EPAP pressure to compensate for the low oxygen.  He understands if the change in EPAP does not change the CNOX results, he would benefit from the addition of 2 L of oxygen nocturnally with his ASV.     PLAN:   Patient Instructions   1) Continue ASV at EPAP and change 7/15, PS3/15 with max pressure of [id number]. Pending results of CNOX on updated EPAP setting, will start 2L of oxygen. - call for results  2) Clean mask and supplies weekly and change them as insurance allows  3) Continue Advair 500/50, Spiriva, rescue inhaler  4) Encourage light conditioning and weight loss efforts  5) Vaccines: Up to date with Pneumovax 23.   6) Return in about 6 months (around 1/9/2019) for Compliance, review of symptoms, if not sooner, follow up with ALANNA Young.

## 2018-07-09 NOTE — PROGRESS NOTES
CC:  Here for f/u pulmonary issues as listed below    HPI:           Patient Active Problem List    Diagnosis Date Noted   • STEMI (ST elevation myocardial infarction) (Formerly [facility]) 07/23/2015     Priority: High   • Cardiomyopathy, ischemic 07/25/2015     Priority: Medium   • Coronary artery disease involving native coronary artery of native heart without angina pectoris 07/09/2018   • Central sleep apnea 06/28/2017   • Hypoxemia 12/14/2016   • Hypersomnolence 12/14/2016   • Hypoxia 10/23/2015   • PAF (paroxysmal atrial fibrillation) (Formerly [facility]) 07/26/2015   • COPD (chronic obstructive pulmonary disease) (Formerly [facility]) 07/24/2015   • Hypothyroid 07/24/2015   • Thrombocytopenia (Formerly [facility]) 07/24/2015   • Systolic heart failure secondary to coronary artery disease (Formerly [facility]) 07/24/2015   • Diastolic heart failure (Formerly [facility]) 07/24/2015   • Cervical disc disease 10/13/2014   • Coronary artery disease due to calcified coronary lesion 02/13/2014   • Essential hypertension, benign 02/13/2014   • HLD (hyperlipidemia) 02/13/2014   • Acute MI, inferolateral wall, initial episode of care (Formerly [facility]) 12/23/2013       Past Medical History:   Diagnosis Date   • Acute MI, inferolateral wall, initial episode of care (Formerly [facility]) 12/23/2013   • Arthritis    • ASTHMA     inhaler daily   • Back pain    • Breath shortness 2013 24/7   • CAD (coronary artery disease) 2/13/2014    Bare-metal stent to the circumflex in December 2013. Prior stent to the LAD diagonal had mild in-stent restenosis. 30% disease is noted in the right coronary artery.   • Chronic airway obstruction, not elsewhere classified    • EMPHYSEMA    • Essential hypertension, benign 2/13/2014   • HLD (hyperlipidemia) 2/13/2014   • Hypertension 2013    states well controlled on meds   • Infectious disease     10/5/14 daughter and granddaughter had colds   • ADRYAN on CPAP 6/28/2017   • Other and unspecified angina pectoris    • PAF (paroxysmal atrial fibrillation) (Formerly [facility]) 7/26/2015   • Pain 2013    6/10   • Pain 2014    4/10  neck   • Pneumonia    • Supplemental oxygen dependent     2.5 liters at night   • Unspecified disorder of thyroid 2010    on synthroid   • Unspecified hemorrhagic conditions     bruises easily related to plavix       Past Surgical History:   Procedure Laterality Date   • MULTIPLE CORONARY ARTERY BYPASS ENDO VEIN HARVEST N/A 7/23/2015    Procedure: MULTIPLE CORONARY ARTERY BYPASS ENDO VEIN HARVEST;  Surgeon: Deuce Tam M.D.;  Location: SURGERY [facility];  Service:    • CERVICAL DISK AND FUSION ANTERIOR  10/13/2014    Performed by Moises Kerns M.D. at SURGERY [facility]   • OTHER ORTHOPEDIC SURGERY  2012    right knee   • OTHER CARDIAC SURGERY  2008    cardiac stents   • GYN SURGERY         Family History   Problem Relation Age of Onset   • Heart Attack Father    • Cancer Mother        Social History   Substance Use Topics   • Smoking status: Former Smoker     Packs/day: 2.00     Years: 20.00     Types: Cigarettes, Cigars     Quit date: 10/7/1999   • Smokeless tobacco: Never Used   • Alcohol use No       Current Outpatient Prescriptions   Medication Sig Dispense Refill   • benazepril (LOTENSIN) 20 MG Tab Take 1 Tab by mouth every day. 90 Tab 1   • SPIRIVA HANDIHALER 18 MCG Cap INHALE THE CONTENTS OF 1  CAPSULE VIA HANDIHALER  DAILY 90 Cap 3   • ADVAIR DISKUS 500-50 MCG/DOSE AEROSOL POWDER, BREATH ACTIVATED USE 1 PUFF EVERY 12 HOURS 3 Inhaler 3   • ezetimibe (ZETIA) 10 MG Tab Take 1 Tab by mouth every day. 90 Tab 1   • atorvastatin (LIPITOR) 80 MG tablet Take 1 Tab by mouth every day. 90 Tab 1   • doxazosin (CARDURA) 4 MG Tab Take 1 Tab by mouth every day. 90 Tab 1   • furosemide (LASIX) 20 MG Tab Take 1 Tab by mouth every day. 90 Tab 1   • metoprolol SR (TOPROL XL) 50 MG TABLET SR 24 HR Take 1 Tab by mouth every day. 90 Tab 1   • potassium chloride SA (K-DUR) 10 MEQ Tab CR Take 1 Tab by mouth every day. 90 Tab 1   • prasugrel (EFFIENT) 10 MG Tab Take 1 Tab by mouth every day. 90 Tab 1   • omeprazole  (PRILOSEC) 40 MG delayed-release capsule 40 mg.     • levothyroxine (SYNTHROID) 75 MCG Tab Take 75 mcg by mouth daily.     • tizanidine (ZANAFLEX) 4 MG Tab Take 4 mg by mouth every 6 hours as needed.     • Melatonin 10 MG Tab Take  by mouth.     • Cranberry 500 MG Cap Take  by mouth.     • Zinc 50 MG Cap Take 50 mg by mouth every day.     • diphenhydrAMINE (BENADRYL) 25 MG Tab Take 25 mg by mouth every 6 hours as needed for Sleep.     • KRILL OIL PO Take  by mouth.     • calcium polycarbophil (FIBERCON) 625 MG Tab Take 625 mg by mouth every day.     • vitamin D (CHOLECALCIFEROL) 1000 UNIT Tab Take 1,000 Units by mouth every day.     • Flaxseed, Linseed, (FLAXSEED OIL PO) Take  by mouth.     • hydrocodone-acetaminophen (NORCO) 5-325 MG TABS per tablet Take 1-2 Tabs by mouth every four hours as needed ((Pain Scale 1-3) Give 1 tablet first, may give second tablet after 1 hour if pain still unrelieved.). 120 Tab 0   • aspirin EC (ECOTRIN) 81 MG TBEC Take 81 mg by mouth every day.     • tamsulosin (FLOMAX) 0.4 MG capsule Take 0.4 mg by mouth ONE-HALF HOUR AFTER BREAKFAST.     • nitroglycerin (NITROSTAT) 0.4 MG SL Tab Place 1 Tab under tongue as needed for Chest Pain (Place 1 tablet under the tongue every 5 minutes up to 3 doses as needed for chest pain). 75 Tab 3   • tamsulosin (FLOMAX) 0.4 MG capsule 0.4 mg.     • FLUARIX QUADRIVALENT 0.5 ML Suspension Prefilled Syringe injection inject 0.5 milliliter intramuscularly  0   • PREVNAR 13 syringe inject 0.5 milliliter intramuscularly  0   • PREVIDENT 5000 SENSITIVE 1.1-5 % Paste   0   • Albuterol Sulfate (PROAIR RESPICLICK) 108 (90 BASE) MCG/ACT AEROSOL POWDER, BREATH ACTIVATED Inhale 2 Puffs by mouth as needed (for shortness of breath, wheezing.). every 4-6 hours as needed. 3 Each 3   • albuterol (VENTOLIN OR PROVENTIL) 108 (90 BASE) MCG/ACT Aero Soln inhalation aerosol Inhale 2 Puffs by mouth as needed. (Patient not taking: Reported on 7/9/2018) 8.5 g 3     No current  facility-administered medications for this visit.           Allergies: Morphine          ROS ***  Gen: Denies fever, chills, unintentional weight loss, fatigue, night sweats  E/N/T: Denies nasal congestion, ear pain  Resp:Denies Dyspnea, wheezing, cough, sputum production, hemoptysis  CV: Denies chest pain, chest tightness, palpitations  Sleep:Denies morning headache  Neuro: Denies frequent headaches, weakness, dizziness  GI: Denies acid reflux, N/V  See HPI.  All other systems reviewed and negative          Vital signs for this encounter:  Vitals:    07/09/18 1101   Height: 1.829 m (6')   Weight: (!) 126.1 kg (278 lb)   Weight % change since last entry.: 0 %   BP: 112/68   Pulse: 61   BMI (Calculated): 37.7   Resp: 18   Temp: 36.5 °C (97.7 °F)   O2 sat % room air: 94 %                 Physical Exam:   Appearance: well developed, well nourished, no acute distress. ***  Eyes: PERRL, EOM intact, sclere white, conjunctiva moist.  Ears: no lesions or deformities.  Hearing: grossly intact.  Nose: no lesions or deformities.  Dentition: good dentition.   Oropharynx: tongue normal, posterior pharynx without erythema or exudate.  Neck: supple, trachea midline, no masses.  Respiratory effort: no intercostal retractions or use of accessory muscles.  Lung auscultation: Bilateral diminished ***  Heart auscultation: no murmur, rub, or gallop ***  Extremities: no cyanosis or edema.  Abdomen: soft, non-tender, no masses.  Gait and station: without difficulty ***  Digits and Nails: no clubbing, cyanosis, petechiae, or nodes.  Cranial nerves: grossly normal.  Motor: no focal deficits observed.   Skin: no rashes, lesions, or ulcers noted.  Orientation: oriented to time, place, and person.  Mood and affect: mood and affect appropriate, normal interaction with examiner.      Assessment   No diagnosis found.    Patient was seen for *** minutes, more than 50% of time spent in face to face review, counseling, and arranging future  evaluation and follow up of medical conditions and care relating to ***. Patient is clinically *** and will have the following changes per plan.     PLAN:   ***

## 2018-07-31 DIAGNOSIS — E78.5 HYPERLIPIDEMIA, UNSPECIFIED HYPERLIPIDEMIA TYPE: ICD-10-CM

## 2018-07-31 DIAGNOSIS — I50.30 DIASTOLIC HEART FAILURE, UNSPECIFIED HF CHRONICITY (HCC): ICD-10-CM

## 2018-07-31 DIAGNOSIS — I25.10 CORONARY ARTERY DISEASE INVOLVING NATIVE HEART WITHOUT ANGINA PECTORIS, UNSPECIFIED VESSEL OR LESION TYPE: ICD-10-CM

## 2018-07-31 DIAGNOSIS — I25.10 CORONARY ARTERY DISEASE INVOLVING NATIVE CORONARY ARTERY OF NATIVE HEART WITHOUT ANGINA PECTORIS: ICD-10-CM

## 2018-07-31 DIAGNOSIS — R07.89 OTHER CHEST PAIN: ICD-10-CM

## 2018-07-31 DIAGNOSIS — I25.10 SYSTOLIC HEART FAILURE SECONDARY TO CORONARY ARTERY DISEASE (HCC): ICD-10-CM

## 2018-07-31 DIAGNOSIS — I25.84 CORONARY ARTERY DISEASE DUE TO CALCIFIED CORONARY LESION: ICD-10-CM

## 2018-07-31 DIAGNOSIS — I50.20 SYSTOLIC HEART FAILURE SECONDARY TO CORONARY ARTERY DISEASE (HCC): ICD-10-CM

## 2018-07-31 DIAGNOSIS — I21.3 ST ELEVATION MYOCARDIAL INFARCTION (STEMI), UNSPECIFIED ARTERY (HCC): ICD-10-CM

## 2018-07-31 DIAGNOSIS — I10 ESSENTIAL HYPERTENSION: ICD-10-CM

## 2018-07-31 DIAGNOSIS — I25.10 CORONARY ARTERY DISEASE DUE TO CALCIFIED CORONARY LESION: ICD-10-CM

## 2018-08-01 RX ORDER — DOXAZOSIN MESYLATE 4 MG/1
4 TABLET ORAL DAILY
Qty: 90 TAB | Refills: 3 | Status: SHIPPED | OUTPATIENT
Start: 2018-08-01 | End: 2018-08-14 | Stop reason: SDUPTHER

## 2018-08-01 RX ORDER — POTASSIUM CHLORIDE 750 MG/1
10 TABLET, EXTENDED RELEASE ORAL DAILY
Qty: 90 TAB | Refills: 3 | Status: SHIPPED | OUTPATIENT
Start: 2018-08-01 | End: 2018-08-14 | Stop reason: SDUPTHER

## 2018-08-01 RX ORDER — NITROGLYCERIN 0.4 MG/1
0.4 TABLET SUBLINGUAL PRN
Qty: 75 TAB | Refills: 3 | Status: SHIPPED | OUTPATIENT
Start: 2018-08-01 | End: 2018-08-14 | Stop reason: SDUPTHER

## 2018-08-01 RX ORDER — EZETIMIBE 10 MG/1
10 TABLET ORAL DAILY
Qty: 90 TAB | Refills: 3 | Status: SHIPPED | OUTPATIENT
Start: 2018-08-01 | End: 2018-08-14 | Stop reason: SDUPTHER

## 2018-08-01 RX ORDER — PRASUGREL 10 MG/1
10 TABLET, FILM COATED ORAL DAILY
Qty: 90 TAB | Refills: 3 | Status: SHIPPED | OUTPATIENT
Start: 2018-08-01 | End: 2018-08-14 | Stop reason: SDUPTHER

## 2018-08-01 RX ORDER — FUROSEMIDE 20 MG/1
20 TABLET ORAL DAILY
Qty: 90 TAB | Refills: 3 | Status: SHIPPED | OUTPATIENT
Start: 2018-08-01 | End: 2018-08-14 | Stop reason: SDUPTHER

## 2018-08-01 RX ORDER — METOPROLOL SUCCINATE 50 MG/1
50 TABLET, EXTENDED RELEASE ORAL DAILY
Qty: 90 TAB | Refills: 3 | Status: SHIPPED | OUTPATIENT
Start: 2018-08-01 | End: 2018-08-14 | Stop reason: SDUPTHER

## 2018-08-01 RX ORDER — ATORVASTATIN CALCIUM 80 MG/1
80 TABLET, FILM COATED ORAL DAILY
Qty: 90 TAB | Refills: 3 | Status: SHIPPED | OUTPATIENT
Start: 2018-08-01 | End: 2018-08-14 | Stop reason: SDUPTHER

## 2018-08-14 DIAGNOSIS — I25.10 CORONARY ARTERY DISEASE INVOLVING NATIVE CORONARY ARTERY OF NATIVE HEART WITHOUT ANGINA PECTORIS: ICD-10-CM

## 2018-08-14 DIAGNOSIS — I50.30 DIASTOLIC HEART FAILURE, UNSPECIFIED HF CHRONICITY (HCC): ICD-10-CM

## 2018-08-14 DIAGNOSIS — I25.10 CORONARY ARTERY DISEASE DUE TO CALCIFIED CORONARY LESION: ICD-10-CM

## 2018-08-14 DIAGNOSIS — I21.3 ST ELEVATION MYOCARDIAL INFARCTION (STEMI), UNSPECIFIED ARTERY (HCC): ICD-10-CM

## 2018-08-14 DIAGNOSIS — R07.89 OTHER CHEST PAIN: ICD-10-CM

## 2018-08-14 DIAGNOSIS — I25.10 CORONARY ARTERY DISEASE INVOLVING NATIVE HEART WITHOUT ANGINA PECTORIS, UNSPECIFIED VESSEL OR LESION TYPE: ICD-10-CM

## 2018-08-14 DIAGNOSIS — E78.5 HYPERLIPIDEMIA, UNSPECIFIED HYPERLIPIDEMIA TYPE: ICD-10-CM

## 2018-08-14 DIAGNOSIS — I25.10 SYSTOLIC HEART FAILURE SECONDARY TO CORONARY ARTERY DISEASE (HCC): ICD-10-CM

## 2018-08-14 DIAGNOSIS — I50.20 SYSTOLIC HEART FAILURE SECONDARY TO CORONARY ARTERY DISEASE (HCC): ICD-10-CM

## 2018-08-14 DIAGNOSIS — I10 ESSENTIAL HYPERTENSION: ICD-10-CM

## 2018-08-14 DIAGNOSIS — I25.84 CORONARY ARTERY DISEASE DUE TO CALCIFIED CORONARY LESION: ICD-10-CM

## 2018-08-14 RX ORDER — ATORVASTATIN CALCIUM 80 MG/1
80 TABLET, FILM COATED ORAL DAILY
Qty: 90 TAB | Refills: 3 | Status: SHIPPED | OUTPATIENT
Start: 2018-08-14 | End: 2019-08-28 | Stop reason: SDUPTHER

## 2018-08-14 RX ORDER — POTASSIUM CHLORIDE 750 MG/1
10 TABLET, EXTENDED RELEASE ORAL DAILY
Qty: 90 TAB | Refills: 3 | Status: SHIPPED | OUTPATIENT
Start: 2018-08-14 | End: 2019-08-22 | Stop reason: SDUPTHER

## 2018-08-14 RX ORDER — BENAZEPRIL HYDROCHLORIDE 20 MG/1
20 TABLET ORAL DAILY
Qty: 90 TAB | Refills: 3 | Status: SHIPPED | OUTPATIENT
Start: 2018-08-14 | End: 2019-08-01 | Stop reason: SDUPTHER

## 2018-08-14 RX ORDER — NITROGLYCERIN 0.4 MG/1
0.4 TABLET SUBLINGUAL PRN
Qty: 75 TAB | Refills: 3 | Status: SHIPPED | OUTPATIENT
Start: 2018-08-14 | End: 2018-10-01 | Stop reason: SDUPTHER

## 2018-08-14 RX ORDER — DOXAZOSIN MESYLATE 4 MG/1
4 TABLET ORAL DAILY
Qty: 90 TAB | Refills: 3 | Status: SHIPPED | OUTPATIENT
Start: 2018-08-14 | End: 2019-08-25 | Stop reason: SDUPTHER

## 2018-08-14 RX ORDER — FUROSEMIDE 20 MG/1
20 TABLET ORAL DAILY
Qty: 90 TAB | Refills: 3 | Status: SHIPPED | OUTPATIENT
Start: 2018-08-14 | End: 2019-08-28 | Stop reason: SDUPTHER

## 2018-08-14 RX ORDER — EZETIMIBE 10 MG/1
10 TABLET ORAL DAILY
Qty: 90 TAB | Refills: 3 | Status: SHIPPED | OUTPATIENT
Start: 2018-08-14 | End: 2019-08-21 | Stop reason: SDUPTHER

## 2018-08-14 RX ORDER — PRASUGREL 10 MG/1
10 TABLET, FILM COATED ORAL DAILY
Qty: 90 TAB | Refills: 3 | Status: SHIPPED | OUTPATIENT
Start: 2018-08-14 | End: 2019-06-21

## 2018-08-14 RX ORDER — METOPROLOL SUCCINATE 50 MG/1
50 TABLET, EXTENDED RELEASE ORAL DAILY
Qty: 90 TAB | Refills: 3 | Status: SHIPPED | OUTPATIENT
Start: 2018-08-14 | End: 2019-07-23 | Stop reason: SDUPTHER

## 2018-08-30 DIAGNOSIS — G47.00 INSOMNIA, UNSPECIFIED TYPE: ICD-10-CM

## 2018-08-30 NOTE — TELEPHONE ENCOUNTER
Have we ever prescribed this med? Yes.  If yes, what date? 5/29/18    Last OV: 7/9/18    Next OV: 12/18/18    DX: Insomnia, unspecified type (G47.00)    Medications: temazepam (RESTORIL) 15 MG Cap

## 2018-08-31 DIAGNOSIS — G47.00 INSOMNIA, UNSPECIFIED TYPE: ICD-10-CM

## 2018-08-31 RX ORDER — TEMAZEPAM 15 MG/1
15 CAPSULE ORAL NIGHTLY PRN
Qty: 30 CAP | Refills: 2 | Status: SHIPPED | OUTPATIENT
Start: 2018-08-31 | End: 2018-11-26 | Stop reason: SDUPTHER

## 2018-09-04 RX ORDER — TEMAZEPAM 15 MG/1
CAPSULE ORAL
Qty: 30 CAP | Refills: 2 | Status: SHIPPED
Start: 2018-09-04 | End: 2018-10-04

## 2018-09-04 NOTE — TELEPHONE ENCOUNTER
RX faxed to   [facility] [address] - [address] [place] [id number]  Phone: [phone] Fax: [phone]      Pt notified rx sent

## 2018-09-28 DIAGNOSIS — R07.89 OTHER CHEST PAIN: ICD-10-CM

## 2018-09-28 DIAGNOSIS — I25.10 SYSTOLIC HEART FAILURE SECONDARY TO CORONARY ARTERY DISEASE (HCC): ICD-10-CM

## 2018-09-28 DIAGNOSIS — I50.20 SYSTOLIC HEART FAILURE SECONDARY TO CORONARY ARTERY DISEASE (HCC): ICD-10-CM

## 2018-09-28 DIAGNOSIS — I50.30 DIASTOLIC HEART FAILURE, UNSPECIFIED HF CHRONICITY (HCC): ICD-10-CM

## 2018-09-28 DIAGNOSIS — I25.10 CORONARY ARTERY DISEASE INVOLVING NATIVE CORONARY ARTERY OF NATIVE HEART WITHOUT ANGINA PECTORIS: ICD-10-CM

## 2018-09-28 RX ORDER — NITROGLYCERIN 0.4 MG/1
0.4 TABLET SUBLINGUAL PRN
Qty: 75 TAB | Refills: 3 | Status: CANCELLED | OUTPATIENT
Start: 2018-09-28

## 2018-10-01 DIAGNOSIS — R07.89 OTHER CHEST PAIN: ICD-10-CM

## 2018-10-01 DIAGNOSIS — I25.10 SYSTOLIC HEART FAILURE SECONDARY TO CORONARY ARTERY DISEASE (HCC): ICD-10-CM

## 2018-10-01 DIAGNOSIS — I25.10 CORONARY ARTERY DISEASE INVOLVING NATIVE CORONARY ARTERY OF NATIVE HEART WITHOUT ANGINA PECTORIS: ICD-10-CM

## 2018-10-01 DIAGNOSIS — I50.20 SYSTOLIC HEART FAILURE SECONDARY TO CORONARY ARTERY DISEASE (HCC): ICD-10-CM

## 2018-10-01 DIAGNOSIS — I50.30 DIASTOLIC HEART FAILURE, UNSPECIFIED HF CHRONICITY (HCC): ICD-10-CM

## 2018-10-02 RX ORDER — NITROGLYCERIN 0.4 MG/1
0.4 TABLET SUBLINGUAL PRN
Qty: 75 TAB | Refills: 3 | Status: SHIPPED | OUTPATIENT
Start: 2018-10-02

## 2018-11-26 DIAGNOSIS — G47.00 INSOMNIA, UNSPECIFIED TYPE: ICD-10-CM

## 2018-11-26 NOTE — TELEPHONE ENCOUNTER
Have we ever prescribed this med? Yes.  If yes, what date? 08/31/2018    Last OV: 07/09/2018 - Imelda Odell    Next OV: 12/18/2018 - Imelda Odell    DX: INSOMNIA     Medications: TEMAZEPAM

## 2018-11-27 RX ORDER — TEMAZEPAM 15 MG/1
CAPSULE ORAL
Qty: 30 CAP | Refills: 2 | Status: SHIPPED
Start: 2018-11-27 | End: 2018-12-18 | Stop reason: SDUPTHER

## 2018-12-13 ENCOUNTER — TELEPHONE (OUTPATIENT)
Dept: CARDIOLOGY | Facility: MEDICAL CENTER | Age: 63
End: 2018-12-13

## 2018-12-13 NOTE — TELEPHONE ENCOUNTER
Faxed pre-visit lab order to [facility] [phone]. Pt. Has FV 12-20-18 with FK.

## 2018-12-17 DIAGNOSIS — I25.10 SYSTOLIC HEART FAILURE SECONDARY TO CORONARY ARTERY DISEASE (HCC): ICD-10-CM

## 2018-12-17 DIAGNOSIS — I50.30 DIASTOLIC HEART FAILURE, UNSPECIFIED HF CHRONICITY (HCC): ICD-10-CM

## 2018-12-17 DIAGNOSIS — I50.20 SYSTOLIC HEART FAILURE SECONDARY TO CORONARY ARTERY DISEASE (HCC): ICD-10-CM

## 2018-12-17 DIAGNOSIS — I25.10 CORONARY ARTERY DISEASE INVOLVING NATIVE CORONARY ARTERY OF NATIVE HEART WITHOUT ANGINA PECTORIS: ICD-10-CM

## 2018-12-18 ENCOUNTER — SLEEP CENTER VISIT (OUTPATIENT)
Dept: SLEEP MEDICINE | Facility: MEDICAL CENTER | Age: 63
End: 2018-12-18
Payer: COMMERCIAL

## 2018-12-18 VITALS
HEIGHT: 72 IN | RESPIRATION RATE: 16 BRPM | SYSTOLIC BLOOD PRESSURE: 120 MMHG | WEIGHT: 266 LBS | DIASTOLIC BLOOD PRESSURE: 62 MMHG | TEMPERATURE: 96.8 F | HEART RATE: 55 BPM | OXYGEN SATURATION: 92 % | BODY MASS INDEX: 36.03 KG/M2

## 2018-12-18 DIAGNOSIS — G47.00 INSOMNIA, UNSPECIFIED TYPE: ICD-10-CM

## 2018-12-18 DIAGNOSIS — G47.31 CENTRAL SLEEP APNEA: ICD-10-CM

## 2018-12-18 DIAGNOSIS — R09.02 HYPOXEMIA: ICD-10-CM

## 2018-12-18 DIAGNOSIS — J30.9 ALLERGIC RHINITIS, UNSPECIFIED SEASONALITY, UNSPECIFIED TRIGGER: ICD-10-CM

## 2018-12-18 DIAGNOSIS — J44.9 CHRONIC OBSTRUCTIVE PULMONARY DISEASE, UNSPECIFIED COPD TYPE (HCC): Chronic | ICD-10-CM

## 2018-12-18 PROCEDURE — 99214 OFFICE O/P EST MOD 30 MIN: CPT | Performed by: NURSE PRACTITIONER

## 2018-12-18 RX ORDER — TEMAZEPAM 15 MG/1
15 CAPSULE ORAL NIGHTLY PRN
Qty: 30 CAP | Refills: 3 | Status: SHIPPED
Start: 2018-12-18 | End: 2019-01-17

## 2018-12-18 NOTE — PATIENT INSTRUCTIONS
Plan:    1) Continue Auto SV at current pressures. RX for new mask and supplies sent to his DME.   2) Sleep hygiene discussed. RX refill for Temazepam 15 mg 1 po qhs prn insomnia.   3) Weight loss recommended.  4) Continue Advair, Spiriva and Proair inhalers. RX refill for Proair HFA provided.   5) Encouraged routine walking/exercise.   6) Up to date on Influenza vaccination.  7) Continue to follow with Cardiology.  8) Follow up in 6 months at our Pulmonary Clinic with updated PFT's, sooner OV if needed.

## 2018-12-18 NOTE — PROGRESS NOTES
Chief Complaint   Patient presents with   • Apnea     last seen 7/9/18       HPI:  Norberto White is a 63 y.o. year old male here today for follow-up on his COPD and ADRYAN. He was last seen in the office in July 2018 with ALANNA Young.    Polysomnogram was completed on 12/11/2016 and indicated evidence of mild sleep apnea with an AHI of 14.7 with a low 02 of 84%. He had an incomplete titration to CPAP with development on centrals with airway pressurization. He had 54% centals during the titration portion. He is currently compliant on Auto SV with EPAP min/max 7/15, PS min/max 3/13. His compliance card download today in the office indicate an AHI of 3.9 with an average use of 8-9 hours at night. He does use Temazepam prn for insomnia. He tolerates the pressure well. He has a full face mask which he feels he feels is a good fit. He does feel he sleeps better on therapy. He does feel he wakes more refreshed on therapy. He denies any morning headaches.   He is working on weight loss and is down 10 pounds.     PFT's from 6/2018 are improved in comparison to 2016, but he did take medication prior to testing, and indicate a FEV1 of 2.36L or 62% predicted with a moderate bronchodilator response, FEV1/FVC ratio of 63 with a DLCO of 120%.  He is a former smoker, 40-pack-year history, quitting in 1999. He is compliant on Advair 500/50 µg twice daily, Spiriva daily and Ventolin HFA inhaler as needed.    [place]. He feels his dyspnea has improved overall. He does feel better at lower elevation. He notes an occasional cough which is productive in the morning with clear mucous production. He notes post nasal drip adds to his cough. He notes an occasional wheeze which clears with coughing. He denies fevers or chills. He denies any recent respiratory infections.   He has a history of CAD and Atrial Fibrillation and is followed by Cardiology.            Past Medical History:   Diagnosis Date   •  Acute MI, inferolateral wall, initial episode of care (Formerly [facility]) 12/23/2013   • Arthritis    • ASTHMA     inhaler daily   • Back pain    • Breath shortness 2013 24/7   • CAD (coronary artery disease) 2/13/2014    Bare-metal stent to the circumflex in December 2013. Prior stent to the LAD diagonal had mild in-stent restenosis. 30% disease is noted in the right coronary artery.   • Chronic airway obstruction, not elsewhere classified    • EMPHYSEMA    • Essential hypertension, benign 2/13/2014   • HLD (hyperlipidemia) 2/13/2014   • Hypertension 2013    states well controlled on meds   • Infectious disease     10/5/14 daughter and granddaughter had colds   • ADRYAN on CPAP 6/28/2017   • Other and unspecified angina pectoris    • PAF (paroxysmal atrial fibrillation) (Formerly [facility]) 7/26/2015   • Pain 2013    6/10   • Pain 2014    4/10 neck   • Pneumonia    • Supplemental oxygen dependent     2.5 liters at night   • Unspecified disorder of thyroid 2010    on synthroid   • Unspecified hemorrhagic conditions     bruises easily related to plavix       Past Surgical History:   Procedure Laterality Date   • MULTIPLE CORONARY ARTERY BYPASS ENDO VEIN HARVEST N/A 7/23/2015    Procedure: MULTIPLE CORONARY ARTERY BYPASS ENDO VEIN HARVEST;  Surgeon: Deuce Tam M.D.;  Location: [facility];  Service:    • CERVICAL DISK AND FUSION ANTERIOR  10/13/2014    Performed by Moises Kerns M.D. at SURGERY [facility]   • OTHER ORTHOPEDIC SURGERY  2012    right knee   • OTHER CARDIAC SURGERY  2008    cardiac stents   • GYN SURGERY         Family History   Problem Relation Age of Onset   • Heart Attack Father    • Cancer Mother        Social History     Social History   • Marital status:      Spouse name: N/A   • Number of children: N/A   • Years of education: N/A     Occupational History   • Not on file.     Social History Main Topics   • Smoking status: Former Smoker     Packs/day: 2.00     Years: 20.00     Types: Cigarettes,  Cigars     Quit date: 10/7/1999   • Smokeless tobacco: Never Used   • Alcohol use No   • Drug use: No   • Sexual activity: Not on file     Other Topics Concern   • Not on file     Social History Narrative   • No narrative on file       ROS:  Constitutional: Denies fevers, chills, sweats,  weight loss  Eyes: Denies glasses, vision loss, pain, drainage, double vision  Ears/Nose/Mouth/Throat: Denies ear ache, difficulty hearing, sore throat, persistent hoarseness, decayed teeth/toothache  Cardiovascular: Denies chest pain, tightness, palpitations, swelling in feet/legs, fainting, difficulty breathing when laying down  Respiratory: See HPI   GI: Denies heartburn, difficulty swallowing, nausea, vomiting, abdominal pain, diarrhea, constipation  : Denies frequent urination, painful urination  Integumentary: Denies rashes, lumps or color changes  MSK: Positive for neck and back pain   Neurological: Denies frequent headaches, dizziness, weakness  Sleep: See HPI       Current Outpatient Prescriptions   Medication Sig Dispense Refill   • temazepam (RESTORIL) 15 MG Cap take 1 capsule by mouth at bedtime if needed for sleep 30 Cap 2   • nitroglycerin (NITROSTAT) 0.4 MG SL Tab Place 1 Tab under tongue as needed for Chest Pain (Place 1 tablet under the tongue every 5 minutes up to 3 doses as needed for chest pain). 75 Tab 3   • atorvastatin (LIPITOR) 80 MG tablet Take 1 Tab by mouth every day. 90 Tab 3   • benazepril (LOTENSIN) 20 MG Tab Take 1 Tab by mouth every day. 90 Tab 3   • doxazosin (CARDURA) 4 MG Tab Take 1 Tab by mouth every day. 90 Tab 3   • ezetimibe (ZETIA) 10 MG Tab Take 1 Tab by mouth every day. 90 Tab 3   • furosemide (LASIX) 20 MG Tab Take 1 Tab by mouth every day. 90 Tab 3   • metoprolol SR (TOPROL XL) 50 MG TABLET SR 24 HR Take 1 Tab by mouth every day. 90 Tab 3   • potassium chloride SA (K-DUR) 10 MEQ Tab CR Take 1 Tab by mouth every day. 90 Tab 3   • prasugrel (EFFIENT) 10 MG Tab Take 1 Tab by mouth every  day. 90 Tab 3   • SPIRIVA HANDIHALER 18 MCG Cap INHALE THE CONTENTS OF 1  CAPSULE VIA HANDIHALER  DAILY 90 Cap 3   • ADVAIR DISKUS 500-50 MCG/DOSE AEROSOL POWDER, BREATH ACTIVATED USE 1 PUFF EVERY 12 HOURS 3 Inhaler 3   • omeprazole (PRILOSEC) 40 MG delayed-release capsule 40 mg.     • levothyroxine (SYNTHROID) 75 MCG Tab Take 75 mcg by mouth daily.     • FLUARIX QUADRIVALENT 0.5 ML Suspension Prefilled Syringe injection inject 0.5 milliliter intramuscularly  0   • PREVNAR 13 syringe inject 0.5 milliliter intramuscularly  0   • PREVIDENT 5000 SENSITIVE 1.1-5 % Paste   0   • tizanidine (ZANAFLEX) 4 MG Tab Take 4 mg by mouth every 6 hours as needed.     • Melatonin 10 MG Tab Take  by mouth.     • Cranberry 500 MG Cap Take  by mouth.     • Zinc 50 MG Cap Take 50 mg by mouth every day.     • diphenhydrAMINE (BENADRYL) 25 MG Tab Take 25 mg by mouth every 6 hours as needed for Sleep.     • KRILL OIL PO Take  by mouth.     • calcium polycarbophil (FIBERCON) 625 MG Tab Take 625 mg by mouth every day.     • vitamin D (CHOLECALCIFEROL) 1000 UNIT Tab Take 1,000 Units by mouth every day.     • Flaxseed, Linseed, (FLAXSEED OIL PO) Take  by mouth.     • hydrocodone-acetaminophen (NORCO) 5-325 MG TABS per tablet Take 1-2 Tabs by mouth every four hours as needed ((Pain Scale 1-3) Give 1 tablet first, may give second tablet after 1 hour if pain still unrelieved.). 120 Tab 0   • aspirin EC (ECOTRIN) 81 MG TBEC Take 81 mg by mouth every day.     • tamsulosin (FLOMAX) 0.4 MG capsule Take 0.4 mg by mouth ONE-HALF HOUR AFTER BREAKFAST.     • Albuterol Sulfate (PROAIR RESPICLICK) 108 (90 BASE) MCG/ACT AEROSOL POWDER, BREATH ACTIVATED Inhale 2 Puffs by mouth as needed (for shortness of breath, wheezing.). every 4-6 hours as needed. 3 Each 3     No current facility-administered medications for this visit.        Allergies   Allergen Reactions   • Morphine Anxiety and Unspecified     Pt becomes irritable  Agitation; hot flashes.       Blood  pressure 120/62, pulse (!) 55, temperature 36 °C (96.8 °F), temperature source Temporal, resp. rate 16, height 1.829 m (6'), weight 120.7 kg (266 lb), SpO2 92 %.    PE:   Appearance: Well developed, well nourished, no acute distress  Eyes: PERRL, EOM intact, sclera white, conjunctiva moist  Ears: no lesions or deformities  Hearing: grossly intact  Nose: no lesions or deformities  Oropharynx: tongue normal, posterior pharynx without erythema or exudate  Mallampati Classification: Class 3   Neck: supple, trachea midline, no masses   Respiratory effort: no intercostal retractions or use of accessory muscles  Lung auscultation: no rales, rhonchi or wheezes  Heart auscultation: no murmur rub or gallop  Extremities: no cyanosis or edema  Abdomen: soft ,non tender, no masses  Gait and Station: normal  Digits and nails: no clubbing, cyanosis, petechiae or nodes.  Cranial nerves: grossly intact  Skin: no rashes, lesions or ulcers noted  Orientation: Oriented to time, person and place  Mood and affect: mood and affect appropriate, normal interaction with examiner  Judgement: Intact          Assessment:    1. Central sleep apnea  DME Mask and Supplies   2. Hypoxemia     3. Chronic obstructive pulmonary disease, unspecified COPD type (HCC)  PULMONARY FUNCTION TESTS -Test requested: Complete Pulmonary Function Test    Albuterol Sulfate (PROAIR RESPICLICK) 108 (90 Base) MCG/ACT AEROSOL POWDER, BREATH ACTIVATED   4. BMI 36.0-36.9,adult     5. Insomnia, unspecified type  temazepam (RESTORIL) 15 MG Cap   6. Allergic rhinitis, unspecified seasonality, unspecified trigger           Plan:    1) Continue Auto SV at current pressures. RX for new mask and supplies sent to his DME.   2) Sleep hygiene discussed. RX refill for Temazepam 15 mg 1 po qhs prn insomnia.   3) Weight loss recommended.  4) Continue Advair, Spiriva and Proair inhalers. RX refill for Proair HFA provided.   5) Encouraged routine walking/exercise.   6) Up to date on  Influenza vaccination.  7) Continue to follow with Cardiology.  8) Follow up in 6 months at our Pulmonary Clinic with updated PFT's, sooner OV if needed.        Narcotics: 50 (Value from [employer] System.)    Sedatives: 20 (Value from [employer] System.)    Stimulants: 0 (Value from [employer] System.)     NARxSCORES can range from 000 to 999. This first two digits represent the composite percentile risk based on an overall analysis of prescription drug use. The third digit represents the number of active prescriptions. The distribution of scores in the population is such that approximately 75% fall below 200, 95% fall below 500 and 99% fall below 650.     SELECT THE LINK BELOW TO REVIEW THE [facility] REPORT  or  SELECT THE 'ACCEPT' BUTTON TO CONTINUTE AFTER REVIEWING THE SCORES

## 2018-12-20 ENCOUNTER — OFFICE VISIT (OUTPATIENT)
Dept: CARDIOLOGY | Facility: CLINIC | Age: 63
End: 2018-12-20
Payer: COMMERCIAL

## 2018-12-20 VITALS
DIASTOLIC BLOOD PRESSURE: 60 MMHG | HEIGHT: 72 IN | BODY MASS INDEX: 37.25 KG/M2 | HEART RATE: 60 BPM | SYSTOLIC BLOOD PRESSURE: 116 MMHG | WEIGHT: 275 LBS

## 2018-12-20 DIAGNOSIS — I25.5 CARDIOMYOPATHY, ISCHEMIC: ICD-10-CM

## 2018-12-20 DIAGNOSIS — E78.5 DYSLIPIDEMIA: ICD-10-CM

## 2018-12-20 DIAGNOSIS — I25.84 CORONARY ARTERY DISEASE DUE TO CALCIFIED CORONARY LESION: ICD-10-CM

## 2018-12-20 DIAGNOSIS — I25.10 CORONARY ARTERY DISEASE DUE TO CALCIFIED CORONARY LESION: ICD-10-CM

## 2018-12-20 DIAGNOSIS — I48.0 PAF (PAROXYSMAL ATRIAL FIBRILLATION) (HCC): ICD-10-CM

## 2018-12-20 PROCEDURE — 99214 OFFICE O/P EST MOD 30 MIN: CPT | Performed by: INTERNAL MEDICINE

## 2018-12-20 NOTE — LETTER
[facility] [address] [place] [address]  Phone: [phone]  Fax: [phone]              Norberto White  1955    Encounter Date: 12/20/2018    Keon Cavazso M.D.          PROGRESS NOTE:  Chief Complaint   Patient presents with   • Coronary Artery Disease       Subjective:   Norberto White is a 63 y.o. male who presents today of his coronary artery disease with prior bypass graft surgery in 2015.  He also has hypertension, dyslipidemia, ischemic cardiomyopathy and paroxysmal atrial fibrillation.    Chronic chest discomfort which may last anywhere from 10 minutes to about an hour.  Pressure type sensation which may be on various places on his anterior chest.  This is a mild discomfort which he rates as about 3/10.  It is associated with any nausea, vomiting, diaphoresis or shortness of breath.  Is not necessarily exertional in nature.  He notes no aggravating or alleviating factors and it did not improve after his bypass graft surgery.    He denies any dyspnea on exertion, PND, orthopnea, palpitations or lightheadedness.  He does have some dependent edema.    Past Medical History:   Diagnosis Date   • Acute MI, inferolateral wall, initial episode of care ([facility]) 12/23/2013   • Arthritis    • ASTHMA     inhaler daily   • Back pain    • Breath shortness 2013 24/7   • CAD (coronary artery disease) 2/13/2014    Bare-metal stent to the circumflex in December 2013. Prior stent to the LAD diagonal had mild in-stent restenosis. 30% disease is noted in the right coronary artery.   • Chronic airway obstruction, not elsewhere classified    • EMPHYSEMA    • Essential hypertension, benign 2/13/2014   • HLD (hyperlipidemia) 2/13/2014   • Hypertension 2013    states well controlled on meds   • Infectious disease     10/5/14 daughter and granddaughter had colds   • ADRYAN on CPAP 6/28/2017   • Other and unspecified angina pectoris    • PAF  (paroxysmal atrial fibrillation) ([facility]) 7/26/2015   • Pain 2013    6/10   • Pain 2014    4/10 neck   • Pneumonia    • Supplemental oxygen dependent     2.5 liters at night   • Unspecified disorder of thyroid 2010    on synthroid   • Unspecified hemorrhagic conditions     bruises easily related to plavix     Past Surgical History:   Procedure Laterality Date   • MULTIPLE CORONARY ARTERY BYPASS ENDO VEIN HARVEST N/A 7/23/2015    Procedure: MULTIPLE CORONARY ARTERY BYPASS ENDO VEIN HARVEST;  Surgeon: Deuce Tam M.D.;  Location: SURGERY [facility];  Service:    • CERVICAL DISK AND FUSION ANTERIOR  10/13/2014    Performed by Moises Kerns M.D. at SURGERY [facility]   • OTHER ORTHOPEDIC SURGERY  2012    right knee   • OTHER CARDIAC SURGERY  2008    cardiac stents   • GYN SURGERY       Family History   Problem Relation Age of Onset   • Heart Attack Father    • Cancer Mother      Social History     Social History   • Marital status:      Spouse name: N/A   • Number of children: N/A   • Years of education: N/A     Occupational History   • Not on file.     Social History Main Topics   • Smoking status: Former Smoker     Packs/day: 2.00     Years: 20.00     Types: Cigarettes, Cigars     Quit date: 10/7/1999   • Smokeless tobacco: Never Used   • Alcohol use No   • Drug use: No   • Sexual activity: Not on file     Other Topics Concern   • Not on file     Social History Narrative   • No narrative on file     Allergies   Allergen Reactions   • Morphine Anxiety and Unspecified     Pt becomes irritable  Agitation; hot flashes.     Outpatient Encounter Prescriptions as of 12/20/2018   Medication Sig Dispense Refill   • Albuterol Sulfate (PROAIR RESPICLICK) 108 (90 Base) MCG/ACT AEROSOL POWDER, BREATH ACTIVATED Inhale 2 Puffs by mouth as needed (for shortness of breath, wheezing.). every 4-6 hours as needed. 1 Each 3   • temazepam (RESTORIL) 15 MG Cap Take 1 Cap by mouth at bedtime as needed for Sleep for up to  30 days. 30 Cap 3   • nitroglycerin (NITROSTAT) 0.4 MG SL Tab Place 1 Tab under tongue as needed for Chest Pain (Place 1 tablet under the tongue every 5 minutes up to 3 doses as needed for chest pain). 75 Tab 3   • atorvastatin (LIPITOR) 80 MG tablet Take 1 Tab by mouth every day. 90 Tab 3   • benazepril (LOTENSIN) 20 MG Tab Take 1 Tab by mouth every day. 90 Tab 3   • doxazosin (CARDURA) 4 MG Tab Take 1 Tab by mouth every day. 90 Tab 3   • ezetimibe (ZETIA) 10 MG Tab Take 1 Tab by mouth every day. 90 Tab 3   • furosemide (LASIX) 20 MG Tab Take 1 Tab by mouth every day. 90 Tab 3   • metoprolol SR (TOPROL XL) 50 MG TABLET SR 24 HR Take 1 Tab by mouth every day. 90 Tab 3   • potassium chloride SA (K-DUR) 10 MEQ Tab CR Take 1 Tab by mouth every day. 90 Tab 3   • prasugrel (EFFIENT) 10 MG Tab Take 1 Tab by mouth every day. 90 Tab 3   • SPIRIVA HANDIHALER 18 MCG Cap INHALE THE CONTENTS OF 1  CAPSULE VIA HANDIHALER  DAILY 90 Cap 3   • ADVAIR DISKUS 500-50 MCG/DOSE AEROSOL POWDER, BREATH ACTIVATED USE 1 PUFF EVERY 12 HOURS 3 Inhaler 3   • omeprazole (PRILOSEC) 40 MG delayed-release capsule 40 mg.     • levothyroxine (SYNTHROID) 75 MCG Tab Take 75 mcg by mouth daily.     • FLUARIX QUADRIVALENT 0.5 ML Suspension Prefilled Syringe injection inject 0.5 milliliter intramuscularly  0   • PREVNAR 13 syringe inject 0.5 milliliter intramuscularly  0   • PREVIDENT 5000 SENSITIVE 1.1-5 % Paste   0   • tizanidine (ZANAFLEX) 4 MG Tab Take 4 mg by mouth every 6 hours as needed.     • Melatonin 10 MG Tab Take  by mouth.     • Cranberry 500 MG Cap Take  by mouth.     • Zinc 50 MG Cap Take 50 mg by mouth every day.     • diphenhydrAMINE (BENADRYL) 25 MG Tab Take 25 mg by mouth every 6 hours as needed for Sleep.     • KRILL OIL PO Take  by mouth.     • calcium polycarbophil (FIBERCON) 625 MG Tab Take 625 mg by mouth every day.     • vitamin D (CHOLECALCIFEROL) 1000 UNIT Tab Take 1,000 Units by mouth every day.     • Flaxseed, Linseed,  (FLAXSEED OIL PO) Take  by mouth.     • hydrocodone-acetaminophen (NORCO) 5-325 MG TABS per tablet Take 1-2 Tabs by mouth every four hours as needed ((Pain Scale 1-3) Give 1 tablet first, may give second tablet after 1 hour if pain still unrelieved.). 120 Tab 0   • aspirin EC (ECOTRIN) 81 MG TBEC Take 81 mg by mouth every day.     • tamsulosin (FLOMAX) 0.4 MG capsule Take 0.4 mg by mouth ONE-HALF HOUR AFTER BREAKFAST.       No facility-administered encounter medications on file as of 12/20/2018.      ROS     Objective:   /60 (BP Location: Left arm, Patient Position: Sitting, BP Cuff Size: Adult)   Pulse 60   Ht 1.829 m (6')   Wt 124.7 kg (275 lb)   BMI 37.30 kg/m²      Physical Exam   Constitutional: He appears well-developed and well-nourished.   Neck: No JVD present.   Cardiovascular: Normal rate and regular rhythm.    No murmur heard.  Pulmonary/Chest: Effort normal and breath sounds normal. He has no rales.   Abdominal: Soft. There is no tenderness.   Musculoskeletal: He exhibits no edema.     His LDL in August 2017 was 68.  His most recent lab work was a CMP only which was within normal limits except for minimally elevated bilirubin.    Assessment:     1. Coronary artery disease due to calcified coronary lesion     2. Cardiomyopathy, ischemic     3. Dyslipidemia     4. PAF (paroxysmal atrial fibrillation) ([facility])         Medical Decision Making:  Today's Assessment / Status / Plan:     Mr. White is clinically stable.  He does have some chronic atypical chest discomfort which does not sound anginal in etiology.  He has a history of perioperative atrial fibrillation with his bypass graft surgery but no recurrence since that time.  He does have a have a mild ischemic cardiomyopathy.  This was noted around the time of his bypass graft surgery and we will repeat an echocardiogram to see if his LV function is normalized.  His lipid status has been under fair control but we will get a high sensitive  C-reactive protein with repeat lab work in about 6 months.      Alhaji Fenton M.D.  [address] [place] [id number]  VIA Facsimile: [phone]

## 2018-12-20 NOTE — PROGRESS NOTES
Chief Complaint   Patient presents with   • Coronary Artery Disease       Subjective:   Norberto White is a 63 y.o. male who presents today of his coronary artery disease with prior bypass graft surgery in 2015.  He also has hypertension, dyslipidemia, ischemic cardiomyopathy and paroxysmal atrial fibrillation.    Chronic chest discomfort which may last anywhere from 10 minutes to about an hour.  Pressure type sensation which may be on various places on his anterior chest.  This is a mild discomfort which he rates as about 3/10.  It is associated with any nausea, vomiting, diaphoresis or shortness of breath.  Is not necessarily exertional in nature.  He notes no aggravating or alleviating factors and it did not improve after his bypass graft surgery.    He denies any dyspnea on exertion, PND, orthopnea, palpitations or lightheadedness.  He does have some dependent edema.    Past Medical History:   Diagnosis Date   • Acute MI, inferolateral wall, initial episode of care ([facility]) 12/23/2013   • Arthritis    • ASTHMA     inhaler daily   • Back pain    • Breath shortness 2013 24/7   • CAD (coronary artery disease) 2/13/2014    Bare-metal stent to the circumflex in December 2013. Prior stent to the LAD diagonal had mild in-stent restenosis. 30% disease is noted in the right coronary artery.   • Chronic airway obstruction, not elsewhere classified    • EMPHYSEMA    • Essential hypertension, benign 2/13/2014   • HLD (hyperlipidemia) 2/13/2014   • Hypertension 2013    states well controlled on meds   • Infectious disease     10/5/14 daughter and granddaughter had colds   • ADRYAN on CPAP 6/28/2017   • Other and unspecified angina pectoris    • PAF (paroxysmal atrial fibrillation) ([facility]) 7/26/2015   • Pain 2013    6/10   • Pain 2014    4/10 neck   • Pneumonia    • Supplemental oxygen dependent     2.5 liters at night   • Unspecified disorder of thyroid 2010    on synthroid   • Unspecified hemorrhagic conditions      bruises easily related to plavix     Past Surgical History:   Procedure Laterality Date   • MULTIPLE CORONARY ARTERY BYPASS ENDO VEIN HARVEST N/A 7/23/2015    Procedure: MULTIPLE CORONARY ARTERY BYPASS ENDO VEIN HARVEST;  Surgeon: Deuce Tam M.D.;  Location: SURGERY [facility];  Service:    • CERVICAL DISK AND FUSION ANTERIOR  10/13/2014    Performed by Moises Kerns M.D. at SURGERY [facility]   • OTHER ORTHOPEDIC SURGERY  2012    right knee   • OTHER CARDIAC SURGERY  2008    cardiac stents   • GYN SURGERY       Family History   Problem Relation Age of Onset   • Heart Attack Father    • Cancer Mother      Social History     Social History   • Marital status:      Spouse name: N/A   • Number of children: N/A   • Years of education: N/A     Occupational History   • Not on file.     Social History Main Topics   • Smoking status: Former Smoker     Packs/day: 2.00     Years: 20.00     Types: Cigarettes, Cigars     Quit date: 10/7/1999   • Smokeless tobacco: Never Used   • Alcohol use No   • Drug use: No   • Sexual activity: Not on file     Other Topics Concern   • Not on file     Social History Narrative   • No narrative on file     Allergies   Allergen Reactions   • Morphine Anxiety and Unspecified     Pt becomes irritable  Agitation; hot flashes.     Outpatient Encounter Prescriptions as of 12/20/2018   Medication Sig Dispense Refill   • Albuterol Sulfate (PROAIR RESPICLICK) 108 (90 Base) MCG/ACT AEROSOL POWDER, BREATH ACTIVATED Inhale 2 Puffs by mouth as needed (for shortness of breath, wheezing.). every 4-6 hours as needed. 1 Each 3   • temazepam (RESTORIL) 15 MG Cap Take 1 Cap by mouth at bedtime as needed for Sleep for up to 30 days. 30 Cap 3   • nitroglycerin (NITROSTAT) 0.4 MG SL Tab Place 1 Tab under tongue as needed for Chest Pain (Place 1 tablet under the tongue every 5 minutes up to 3 doses as needed for chest pain). 75 Tab 3   • atorvastatin (LIPITOR) 80 MG tablet Take 1 Tab by mouth  every day. 90 Tab 3   • benazepril (LOTENSIN) 20 MG Tab Take 1 Tab by mouth every day. 90 Tab 3   • doxazosin (CARDURA) 4 MG Tab Take 1 Tab by mouth every day. 90 Tab 3   • ezetimibe (ZETIA) 10 MG Tab Take 1 Tab by mouth every day. 90 Tab 3   • furosemide (LASIX) 20 MG Tab Take 1 Tab by mouth every day. 90 Tab 3   • metoprolol SR (TOPROL XL) 50 MG TABLET SR 24 HR Take 1 Tab by mouth every day. 90 Tab 3   • potassium chloride SA (K-DUR) 10 MEQ Tab CR Take 1 Tab by mouth every day. 90 Tab 3   • prasugrel (EFFIENT) 10 MG Tab Take 1 Tab by mouth every day. 90 Tab 3   • SPIRIVA HANDIHALER 18 MCG Cap INHALE THE CONTENTS OF 1  CAPSULE VIA HANDIHALER  DAILY 90 Cap 3   • ADVAIR DISKUS 500-50 MCG/DOSE AEROSOL POWDER, BREATH ACTIVATED USE 1 PUFF EVERY 12 HOURS 3 Inhaler 3   • omeprazole (PRILOSEC) 40 MG delayed-release capsule 40 mg.     • levothyroxine (SYNTHROID) 75 MCG Tab Take 75 mcg by mouth daily.     • FLUARIX QUADRIVALENT 0.5 ML Suspension Prefilled Syringe injection inject 0.5 milliliter intramuscularly  0   • PREVNAR 13 syringe inject 0.5 milliliter intramuscularly  0   • PREVIDENT 5000 SENSITIVE 1.1-5 % Paste   0   • tizanidine (ZANAFLEX) 4 MG Tab Take 4 mg by mouth every 6 hours as needed.     • Melatonin 10 MG Tab Take  by mouth.     • Cranberry 500 MG Cap Take  by mouth.     • Zinc 50 MG Cap Take 50 mg by mouth every day.     • diphenhydrAMINE (BENADRYL) 25 MG Tab Take 25 mg by mouth every 6 hours as needed for Sleep.     • KRILL OIL PO Take  by mouth.     • calcium polycarbophil (FIBERCON) 625 MG Tab Take 625 mg by mouth every day.     • vitamin D (CHOLECALCIFEROL) 1000 UNIT Tab Take 1,000 Units by mouth every day.     • Flaxseed, Linseed, (FLAXSEED OIL PO) Take  by mouth.     • hydrocodone-acetaminophen (NORCO) 5-325 MG TABS per tablet Take 1-2 Tabs by mouth every four hours as needed ((Pain Scale 1-3) Give 1 tablet first, may give second tablet after 1 hour if pain still unrelieved.). 120 Tab 0   • aspirin EC  (ECOTRIN) 81 MG TBEC Take 81 mg by mouth every day.     • tamsulosin (FLOMAX) 0.4 MG capsule Take 0.4 mg by mouth ONE-HALF HOUR AFTER BREAKFAST.       No facility-administered encounter medications on file as of 12/20/2018.      ROS     Objective:   /60 (BP Location: Left arm, Patient Position: Sitting, BP Cuff Size: Adult)   Pulse 60   Ht 1.829 m (6')   Wt 124.7 kg (275 lb)   BMI 37.30 kg/m²     Physical Exam   Constitutional: He appears well-developed and well-nourished.   Neck: No JVD present.   Cardiovascular: Normal rate and regular rhythm.    No murmur heard.  Pulmonary/Chest: Effort normal and breath sounds normal. He has no rales.   Abdominal: Soft. There is no tenderness.   Musculoskeletal: He exhibits no edema.     His LDL in August 2017 was 68.  His most recent lab work was a CMP only which was within normal limits except for minimally elevated bilirubin.    Assessment:     1. Coronary artery disease due to calcified coronary lesion     2. Cardiomyopathy, ischemic     3. Dyslipidemia     4. PAF (paroxysmal atrial fibrillation) ([facility])         Medical Decision Making:  Today's Assessment / Status / Plan:     Mr. White is clinically stable.  He does have some chronic atypical chest discomfort which does not sound anginal in etiology.  He has a history of perioperative atrial fibrillation with his bypass graft surgery but no recurrence since that time.  He does have a have a mild ischemic cardiomyopathy.  This was noted around the time of his bypass graft surgery and we will repeat an echocardiogram to see if his LV function is normalized.  His lipid status has been under fair control but we will get a high sensitive C-reactive protein with repeat lab work in about 6 months.

## 2019-03-01 DIAGNOSIS — J44.9 CHRONIC OBSTRUCTIVE PULMONARY DISEASE, UNSPECIFIED COPD TYPE (HCC): Chronic | ICD-10-CM

## 2019-03-04 RX ORDER — TIOTROPIUM BROMIDE 18 UG/1
CAPSULE ORAL; RESPIRATORY (INHALATION)
Qty: 90 CAP | Refills: 3 | Status: SHIPPED | OUTPATIENT
Start: 2019-03-04 | End: 2020-03-27

## 2019-03-04 NOTE — TELEPHONE ENCOUNTER
Have we ever prescribed this med? Yes.  If yes, what date? 04/10/2018    Last OV: 12/18/2018 - Nati Peña    Next OV: 06/26/2019 - Nati Peña    DX: COPD    Medications: Spiriva, advair

## 2019-06-13 ENCOUNTER — TELEPHONE (OUTPATIENT)
Dept: CARDIOLOGY | Facility: MEDICAL CENTER | Age: 64
End: 2019-06-13

## 2019-06-13 NOTE — TELEPHONE ENCOUNTER
wants to add test to lab order   Received: Today   Message Contents   CARLI Whitley/Denisse       Patient would like to add a PSA to his lab order and asking for order to be faxed to [facility]. Pt. #[phone].      Returned patient call. No answer. LVM urging patient to contact his PCP for that order. Pt welcomed to call back for any questions or concerns.

## 2019-06-16 DIAGNOSIS — G47.00 INSOMNIA, UNSPECIFIED TYPE: ICD-10-CM

## 2019-06-17 ENCOUNTER — TELEPHONE (OUTPATIENT)
Dept: CARDIOLOGY | Facility: MEDICAL CENTER | Age: 64
End: 2019-06-17

## 2019-06-17 ENCOUNTER — TELEPHONE (OUTPATIENT)
Dept: PULMONOLOGY | Facility: HOSPICE | Age: 64
End: 2019-06-17

## 2019-06-17 RX ORDER — TEMAZEPAM 15 MG/1
CAPSULE ORAL
Qty: 30 CAP | Refills: 3 | Status: SHIPPED
Start: 2019-06-17 | End: 2019-09-05 | Stop reason: SDUPTHER

## 2019-06-17 NOTE — TELEPHONE ENCOUNTER
Have we ever prescribed this med? Yes.  If yes, what date? 12/18/2018    Last OV: 12/18/2018 - Nati Peña    Next OV: 06/26/2019 - Nati Peña    DX: Insomnia    Medications: Temazepam

## 2019-06-17 NOTE — TELEPHONE ENCOUNTER
calling doctor line in pod trying to reach MA.  MA not available.  States patient is waiting at [facility] for labs - they claim to not have any lab orders.     Reprinted labs and faxed to number patient provided.  Fax [phone]

## 2019-06-17 NOTE — TELEPHONE ENCOUNTER
Pt called states he had requested temazepam (RESTORIL) 15 MG Cap and had contacted pharmacy [facility] they hav not received prescription. Pt states lives out of town and will be heading to [place] to . I informed I will contact pharmacy.     I spoke to Felix pharmacist at [facility] prescription  temazepam (RESTORIL) 15 MG Cap has been called in. Pt contacted and aware

## 2019-06-21 ENCOUNTER — OFFICE VISIT (OUTPATIENT)
Dept: CARDIOLOGY | Facility: MEDICAL CENTER | Age: 64
End: 2019-06-21
Payer: COMMERCIAL

## 2019-06-21 VITALS
SYSTOLIC BLOOD PRESSURE: 104 MMHG | WEIGHT: 270.2 LBS | BODY MASS INDEX: 36.6 KG/M2 | DIASTOLIC BLOOD PRESSURE: 58 MMHG | HEART RATE: 50 BPM | HEIGHT: 72 IN | OXYGEN SATURATION: 95 %

## 2019-06-21 DIAGNOSIS — I25.84 CORONARY ARTERY DISEASE DUE TO CALCIFIED CORONARY LESION: ICD-10-CM

## 2019-06-21 DIAGNOSIS — E78.5 DYSLIPIDEMIA: ICD-10-CM

## 2019-06-21 DIAGNOSIS — I10 ESSENTIAL HYPERTENSION, BENIGN: ICD-10-CM

## 2019-06-21 DIAGNOSIS — I25.10 CORONARY ARTERY DISEASE DUE TO CALCIFIED CORONARY LESION: ICD-10-CM

## 2019-06-21 PROCEDURE — 99214 OFFICE O/P EST MOD 30 MIN: CPT | Performed by: INTERNAL MEDICINE

## 2019-06-21 NOTE — PROGRESS NOTES
Chief Complaint   Patient presents with   • Coronary Artery Disease       Subjective:   Norberto White is a 64 y.o. male who presents today for follow-up of his coronary artery disease with prior bypass graft surgery in 2015.  He also has hypertension, dyslipidemia, ischemic cardiomyopathy and paroxysmal atrial fibrillation.    He was going to have an echocardiogram prior to his follow-up appointment.  However, he spends the winter in [place] and the echo was not approved until after he gone south for the winter.    He has had no dyspnea on exertion but is not getting much in the way of regular activity.  He denies any PND, orthopnea or edema.  He has had no chest discomfort, palpitations or lightheadedness.      Past Medical History:   Diagnosis Date   • Acute MI, inferolateral wall, initial episode of care ([facility]) 12/23/2013   • Arthritis    • ASTHMA     inhaler daily   • Back pain    • Breath shortness 2013 24/7   • CAD (coronary artery disease) 2/13/2014    Bare-metal stent to the circumflex in December 2013. Prior stent to the LAD diagonal had mild in-stent restenosis. 30% disease is noted in the right coronary artery.   • Chronic airway obstruction, not elsewhere classified    • EMPHYSEMA    • Essential hypertension, benign 2/13/2014   • HLD (hyperlipidemia) 2/13/2014   • Hypertension 2013    states well controlled on meds   • Infectious disease     10/5/14 daughter and granddaughter had colds   • ADRYAN on CPAP 6/28/2017   • Other and unspecified angina pectoris    • PAF (paroxysmal atrial fibrillation) ([facility]) 7/26/2015   • Pain 2013    6/10   • Pain 2014    4/10 neck   • Pneumonia    • Supplemental oxygen dependent     2.5 liters at night   • Unspecified disorder of thyroid 2010    on synthroid   • Unspecified hemorrhagic conditions     bruises easily related to plavix     Past Surgical History:   Procedure Laterality Date   • MULTIPLE CORONARY ARTERY BYPASS ENDO VEIN HARVEST N/A 7/23/2015    Procedure:  MULTIPLE CORONARY ARTERY BYPASS ENDO VEIN HARVEST;  Surgeon: Deuce Tam M.D.;  Location: SURGERY [facility];  Service:    • CERVICAL DISK AND FUSION ANTERIOR  10/13/2014    Performed by Moises Kerns M.D. at SURGERY [facility]   • OTHER ORTHOPEDIC SURGERY  2012    right knee   • OTHER CARDIAC SURGERY  2008    cardiac stents   • GYN SURGERY       Family History   Problem Relation Age of Onset   • Heart Attack Father    • Cancer Mother      Social History     Social History   • Marital status:      Spouse name: N/A   • Number of children: N/A   • Years of education: N/A     Occupational History   • Not on file.     Social History Main Topics   • Smoking status: Former Smoker     Packs/day: 2.00     Years: 20.00     Types: Cigarettes, Cigars     Quit date: 10/7/1999   • Smokeless tobacco: Never Used   • Alcohol use No   • Drug use: No   • Sexual activity: Not on file     Other Topics Concern   • Not on file     Social History Narrative   • No narrative on file     Allergies   Allergen Reactions   • Morphine Anxiety and Unspecified     Pt becomes irritable  Agitation; hot flashes.     Outpatient Encounter Prescriptions as of 6/21/2019   Medication Sig Dispense Refill   • temazepam (RESTORIL) 15 MG Cap TAKE 1 CAPSULE BY MOUTH EVERY NIGHT AT BEDTIME AS NEEDED FOR SLEEP 30 Cap 3   • ADVAIR DISKUS 500-50 MCG/DOSE AEROSOL POWDER, BREATH ACTIVATED USE 1 PUFF EVERY 12 HOURS 3 Inhaler 3   • SPIRIVA HANDIHALER 18 MCG Cap INHALE THE CONTENTS OF 1  CAPSULE VIA HANDIHALER  DAILY 90 Cap 3   • Albuterol Sulfate (PROAIR RESPICLICK) 108 (90 Base) MCG/ACT AEROSOL POWDER, BREATH ACTIVATED Inhale 2 Puffs by mouth as needed (for shortness of breath, wheezing.). every 4-6 hours as needed. 1 Each 3   • nitroglycerin (NITROSTAT) 0.4 MG SL Tab Place 1 Tab under tongue as needed for Chest Pain (Place 1 tablet under the tongue every 5 minutes up to 3 doses as needed for chest pain). 75 Tab 3   • atorvastatin (LIPITOR) 80  MG tablet Take 1 Tab by mouth every day. 90 Tab 3   • benazepril (LOTENSIN) 20 MG Tab Take 1 Tab by mouth every day. 90 Tab 3   • doxazosin (CARDURA) 4 MG Tab Take 1 Tab by mouth every day. 90 Tab 3   • ezetimibe (ZETIA) 10 MG Tab Take 1 Tab by mouth every day. 90 Tab 3   • furosemide (LASIX) 20 MG Tab Take 1 Tab by mouth every day. 90 Tab 3   • metoprolol SR (TOPROL XL) 50 MG TABLET SR 24 HR Take 1 Tab by mouth every day. 90 Tab 3   • potassium chloride SA (K-DUR) 10 MEQ Tab CR Take 1 Tab by mouth every day. 90 Tab 3   • prasugrel (EFFIENT) 10 MG Tab Take 1 Tab by mouth every day. 90 Tab 3   • omeprazole (PRILOSEC) 40 MG delayed-release capsule 40 mg.     • levothyroxine (SYNTHROID) 75 MCG Tab Take 75 mcg by mouth daily.     • FLUARIX QUADRIVALENT 0.5 ML Suspension Prefilled Syringe injection inject 0.5 milliliter intramuscularly  0   • PREVNAR 13 syringe inject 0.5 milliliter intramuscularly  0   • PREVIDENT 5000 SENSITIVE 1.1-5 % Paste   0   • tizanidine (ZANAFLEX) 4 MG Tab Take 4 mg by mouth every 6 hours as needed.     • Melatonin 10 MG Tab Take  by mouth.     • Cranberry 500 MG Cap Take  by mouth.     • Zinc 50 MG Cap Take 50 mg by mouth every day.     • diphenhydrAMINE (BENADRYL) 25 MG Tab Take 25 mg by mouth every 6 hours as needed for Sleep.     • KRILL OIL PO Take  by mouth.     • calcium polycarbophil (FIBERCON) 625 MG Tab Take 625 mg by mouth every day.     • vitamin D (CHOLECALCIFEROL) 1000 UNIT Tab Take 1,000 Units by mouth every day.     • Flaxseed, Linseed, (FLAXSEED OIL PO) Take  by mouth.     • hydrocodone-acetaminophen (NORCO) 5-325 MG TABS per tablet Take 1-2 Tabs by mouth every four hours as needed ((Pain Scale 1-3) Give 1 tablet first, may give second tablet after 1 hour if pain still unrelieved.). 120 Tab 0   • aspirin EC (ECOTRIN) 81 MG TBEC Take 81 mg by mouth every day.     • tamsulosin (FLOMAX) 0.4 MG capsule Take 0.4 mg by mouth ONE-HALF HOUR AFTER BREAKFAST.       No  facility-administered encounter medications on file as of 6/21/2019.      ROS     Objective:   /58 (BP Location: Left arm, Patient Position: Sitting, BP Cuff Size: Adult)   Pulse (!) 50   Ht 1.829 m (6')   Wt 122.6 kg (270 lb 3.2 oz)   SpO2 95%   BMI 36.65 kg/m²     Physical Exam   Constitutional: He appears well-developed and well-nourished.   Neck: No JVD present.   Cardiovascular: Normal rate and regular rhythm.    No murmur heard.  Pulmonary/Chest: Effort normal and breath sounds normal. He has no rales.   Abdominal: Soft. There is no tenderness.   Musculoskeletal: He exhibits no edema.         Echocardiography Laboratory    CONCLUSIONS  Left ventricular ejection fraction is visually estimated to be 50%.   Grade II diastolic dysfunction.   Poor endocardial definition difficult to assess wall motion.  Trace mitral regurgitation.  Right ventricular systolic pressure is estimated to be 35 mmHg.  No pericardial effusion seen.    NEIDA WHITE  Exam Date:         06/14/2016     Laboratory from June 17: CMP was within normal limits.  Total cholesterol 120, HDL 39, LDL 66 and triglycerides 73.  Hs-CRP 0.5      Assessment:     1. Coronary artery disease due to calcified coronary lesion     2. Essential hypertension, benign     3. Dyslipidemia         Medical Decision Making:  Today's Assessment / Status / Plan:     Mr. White is clinically stable.  His blood pressure and lipid status are under good control.  He is having significant difficulty with bleeding and bruising given his thin skin.  His hs-CRP is low and his lipid status appears to be under good control.  I feel he can discontinue Effient and continue aspirin 81 mg daily.  We will try to reschedule an echocardiogram for some time in about 6 months and he will follow-up post.

## 2019-06-21 NOTE — LETTER
[facility]   [address], [address]  [place] [address]  Phone: [phone]  Fax: [phone]              Norberto White  1955    Encounter Date: 6/21/2019    Keon Cavazos M.D.          PROGRESS NOTE:  Chief Complaint   Patient presents with   • Coronary Artery Disease       Subjective:   Norberto White is a 64 y.o. male who presents today for follow-up of his coronary artery disease with prior bypass graft surgery in 2015.  He also has hypertension, dyslipidemia, ischemic cardiomyopathy and paroxysmal atrial fibrillation.    He was going to have an echocardiogram prior to his follow-up appointment.  However, he spends the winter in [place] and the echo was not approved until after he gone south for the winter.    He has had no dyspnea on exertion but is not getting much in the way of regular activity.  He denies any PND, orthopnea or edema.  He has had no chest discomfort, palpitations or lightheadedness.      Past Medical History:   Diagnosis Date   • Acute MI, inferolateral wall, initial episode of care ([facility]) 12/23/2013   • Arthritis    • ASTHMA     inhaler daily   • Back pain    • Breath shortness 2013 24/7   • CAD (coronary artery disease) 2/13/2014    Bare-metal stent to the circumflex in December 2013. Prior stent to the LAD diagonal had mild in-stent restenosis. 30% disease is noted in the right coronary artery.   • Chronic airway obstruction, not elsewhere classified    • EMPHYSEMA    • Essential hypertension, benign 2/13/2014   • HLD (hyperlipidemia) 2/13/2014   • Hypertension 2013    states well controlled on meds   • Infectious disease     10/5/14 daughter and granddaughter had colds   • ADRYAN on CPAP 6/28/2017   • Other and unspecified angina pectoris    • PAF (paroxysmal atrial fibrillation) ([facility]) 7/26/2015   • Pain 2013    6/10   • Pain 2014    4/10 neck   • Pneumonia    • Supplemental oxygen dependent     2.5 liters at night      • Unspecified disorder of thyroid 2010    on synthroid   • Unspecified hemorrhagic conditions     bruises easily related to plavix     Past Surgical History:   Procedure Laterality Date   • MULTIPLE CORONARY ARTERY BYPASS ENDO VEIN HARVEST N/A 7/23/2015    Procedure: MULTIPLE CORONARY ARTERY BYPASS ENDO VEIN HARVEST;  Surgeon: Deuce Tam M.D.;  Location: SURGERY [facility];  Service:    • CERVICAL DISK AND FUSION ANTERIOR  10/13/2014    Performed by Moises Kerns M.D. at SURGERY [facility]   • OTHER ORTHOPEDIC SURGERY  2012    right knee   • OTHER CARDIAC SURGERY  2008    cardiac stents   • GYN SURGERY       Family History   Problem Relation Age of Onset   • Heart Attack Father    • Cancer Mother      Social History     Social History   • Marital status:      Spouse name: N/A   • Number of children: N/A   • Years of education: N/A     Occupational History   • Not on file.     Social History Main Topics   • Smoking status: Former Smoker     Packs/day: 2.00     Years: 20.00     Types: Cigarettes, Cigars     Quit date: 10/7/1999   • Smokeless tobacco: Never Used   • Alcohol use No   • Drug use: No   • Sexual activity: Not on file     Other Topics Concern   • Not on file     Social History Narrative   • No narrative on file     Allergies   Allergen Reactions   • Morphine Anxiety and Unspecified     Pt becomes irritable  Agitation; hot flashes.     Outpatient Encounter Prescriptions as of 6/21/2019   Medication Sig Dispense Refill   • temazepam (RESTORIL) 15 MG Cap TAKE 1 CAPSULE BY MOUTH EVERY NIGHT AT BEDTIME AS NEEDED FOR SLEEP 30 Cap 3   • ADVAIR DISKUS 500-50 MCG/DOSE AEROSOL POWDER, BREATH ACTIVATED USE 1 PUFF EVERY 12 HOURS 3 Inhaler 3   • SPIRIVA HANDIHALER 18 MCG Cap INHALE THE CONTENTS OF 1  CAPSULE VIA HANDIHALER  DAILY 90 Cap 3   • Albuterol Sulfate (PROAIR RESPICLICK) 108 (90 Base) MCG/ACT AEROSOL POWDER, BREATH ACTIVATED Inhale 2 Puffs by mouth as needed (for shortness of breath,  wheezing.). every 4-6 hours as needed. 1 Each 3   • nitroglycerin (NITROSTAT) 0.4 MG SL Tab Place 1 Tab under tongue as needed for Chest Pain (Place 1 tablet under the tongue every 5 minutes up to 3 doses as needed for chest pain). 75 Tab 3   • atorvastatin (LIPITOR) 80 MG tablet Take 1 Tab by mouth every day. 90 Tab 3   • benazepril (LOTENSIN) 20 MG Tab Take 1 Tab by mouth every day. 90 Tab 3   • doxazosin (CARDURA) 4 MG Tab Take 1 Tab by mouth every day. 90 Tab 3   • ezetimibe (ZETIA) 10 MG Tab Take 1 Tab by mouth every day. 90 Tab 3   • furosemide (LASIX) 20 MG Tab Take 1 Tab by mouth every day. 90 Tab 3   • metoprolol SR (TOPROL XL) 50 MG TABLET SR 24 HR Take 1 Tab by mouth every day. 90 Tab 3   • potassium chloride SA (K-DUR) 10 MEQ Tab CR Take 1 Tab by mouth every day. 90 Tab 3   • prasugrel (EFFIENT) 10 MG Tab Take 1 Tab by mouth every day. 90 Tab 3   • omeprazole (PRILOSEC) 40 MG delayed-release capsule 40 mg.     • levothyroxine (SYNTHROID) 75 MCG Tab Take 75 mcg by mouth daily.     • FLUARIX QUADRIVALENT 0.5 ML Suspension Prefilled Syringe injection inject 0.5 milliliter intramuscularly  0   • PREVNAR 13 syringe inject 0.5 milliliter intramuscularly  0   • PREVIDENT 5000 SENSITIVE 1.1-5 % Paste   0   • tizanidine (ZANAFLEX) 4 MG Tab Take 4 mg by mouth every 6 hours as needed.     • Melatonin 10 MG Tab Take  by mouth.     • Cranberry 500 MG Cap Take  by mouth.     • Zinc 50 MG Cap Take 50 mg by mouth every day.     • diphenhydrAMINE (BENADRYL) 25 MG Tab Take 25 mg by mouth every 6 hours as needed for Sleep.     • KRILL OIL PO Take  by mouth.     • calcium polycarbophil (FIBERCON) 625 MG Tab Take 625 mg by mouth every day.     • vitamin D (CHOLECALCIFEROL) 1000 UNIT Tab Take 1,000 Units by mouth every day.     • Flaxseed, Linseed, (FLAXSEED OIL PO) Take  by mouth.     • hydrocodone-acetaminophen (NORCO) 5-325 MG TABS per tablet Take 1-2 Tabs by mouth every four hours as needed ((Pain Scale 1-3) Give 1 tablet  first, may give second tablet after 1 hour if pain still unrelieved.). 120 Tab 0   • aspirin EC (ECOTRIN) 81 MG TBEC Take 81 mg by mouth every day.     • tamsulosin (FLOMAX) 0.4 MG capsule Take 0.4 mg by mouth ONE-HALF HOUR AFTER BREAKFAST.       No facility-administered encounter medications on file as of 6/21/2019.      ROS     Objective:   /58 (BP Location: Left arm, Patient Position: Sitting, BP Cuff Size: Adult)   Pulse (!) 50   Ht 1.829 m (6')   Wt 122.6 kg (270 lb 3.2 oz)   SpO2 95%   BMI 36.65 kg/m²      Physical Exam   Constitutional: He appears well-developed and well-nourished.   Neck: No JVD present.   Cardiovascular: Normal rate and regular rhythm.    No murmur heard.  Pulmonary/Chest: Effort normal and breath sounds normal. He has no rales.   Abdominal: Soft. There is no tenderness.   Musculoskeletal: He exhibits no edema.         Echocardiography Laboratory    CONCLUSIONS  Left ventricular ejection fraction is visually estimated to be 50%.   Grade II diastolic dysfunction.   Poor endocardial definition difficult to assess wall motion.  Trace mitral regurgitation.  Right ventricular systolic pressure is estimated to be 35 mmHg.  No pericardial effusion seen.    NEIDA WHITE  Exam Date:         06/14/2016     Laboratory from June 17: CMP was within normal limits.  Total cholesterol 120, HDL 39, LDL 66 and triglycerides 73.  Hs-CRP 0.5      Assessment:     1. Coronary artery disease due to calcified coronary lesion     2. Essential hypertension, benign     3. Dyslipidemia         Medical Decision Making:  Today's Assessment / Status / Plan:     Mr. White is clinically stable.  His blood pressure and lipid status are under good control.  He is having significant difficulty with bleeding and bruising given his thin skin.  His hs-CRP is low and his lipid status appears to be under good control.  I feel he can discontinue Effient and continue aspirin 81 mg daily.  We will try to reschedule an  echocardiogram for some time in about 6 months and he will follow-up post.      Alhaji Fenton M.D.  [address] [place] [id number]  VIA Facsimile: [phone]

## 2019-06-25 ENCOUNTER — TELEPHONE (OUTPATIENT)
Dept: CARDIOLOGY | Facility: MEDICAL CENTER | Age: 64
End: 2019-06-25

## 2019-06-25 NOTE — TELEPHONE ENCOUNTER
questions about stopping effient   Received: Today   Message Contents   Lizz WILLIS Rocha, Med Ass't  Denisse KENDRA Molina R.N.   Phone Number: [phone]             FK     Pt calling re: stopping effient, his care givers are concerned & feel he should wean off of this med.   # [phone]      Returned patient call. Pts children are concerned about him stopping abruptly and want to discuss a weaning procedure. They are concerned he will have another heart attack like 5 years ago. We discuss risk vs benefits. Pt states he can get a pin prick and bleed for 3 days. We discuss continuation of 81 mg ASA. He will call for a sooner appt if he develops symptoms/problems. Pt will be f/u with scheduling an echo at [facility]. Pt very appreciative of call back.

## 2019-06-26 ENCOUNTER — APPOINTMENT (OUTPATIENT)
Dept: PULMONOLOGY | Facility: HOSPICE | Age: 64
End: 2019-06-26
Payer: COMMERCIAL

## 2019-06-26 ENCOUNTER — NON-PROVIDER VISIT (OUTPATIENT)
Dept: PULMONOLOGY | Facility: HOSPICE | Age: 64
End: 2019-06-26
Attending: NURSE PRACTITIONER
Payer: COMMERCIAL

## 2019-06-26 VITALS — WEIGHT: 266 LBS | BODY MASS INDEX: 36.08 KG/M2

## 2019-06-26 DIAGNOSIS — J44.9 CHRONIC OBSTRUCTIVE PULMONARY DISEASE, UNSPECIFIED COPD TYPE (HCC): Chronic | ICD-10-CM

## 2019-06-26 ASSESSMENT — PULMONARY FUNCTION TESTS
FEV1/FVC_PERCENT_PREDICTED: 89
FEV1_PERCENT_PREDICTED: 75
FEV1_PREDICTED: 3.76
FEV1/FVC: 67
FVC: 4.12
FEV1: 2.88
FVC_LLN: 4.18
FEV1/FVC_PERCENT_CHANGE: -4
FEV1/FVC_PERCENT_PREDICTED: 93
FEV1_PERCENT_PREDICTED: 76
FEV1/FVC: 70
FEV1: 2.85
FEV1/FVC_PERCENT_CHANGE: 0
FVC_PERCENT_PREDICTED: 84
FVC_PREDICTED: 5.01
FEV1/FVC_PERCENT_PREDICTED: 75
FEV1_PERCENT_CHANGE: 3
FEV1/FVC: 67.06
FEV1/FVC: 70
FEV1/FVC_PERCENT_PREDICTED: 93
FEV1/FVC_PERCENT_PREDICTED: 89
FVC: 4.25
FEV1_PERCENT_CHANGE: 0
FEV1/FVC_PREDICTED: 75
FVC_PERCENT_PREDICTED: 82
FEV1_LLN: 3.14
FEV1/FVC_PERCENT_LLN: 63

## 2019-06-26 NOTE — PROCEDURES
Technician: Estrellita Davis RRT   Good patient effort & cooperation. Patient has Vaso Vagel episodes following FVC's this was noted at the last visit, kept multiple attempts to a minimum.  The results of this test meet the ATS/ERS standards for acceptability & reproducibility.  Test was performed on the [employer] Body Plethysmograph-Elite DX system.  Predicted values were [facility]-3 for spirometry, [facility] for DLCO, ITS for Lung Volumes.  The DLCO was uncorrected for Hgb.  A bronchodilator of Ventolin HFA -2puffs via spacer administered.  DLCO performed during dilation period.    Interpretation;     Spirometry showed FVC of 4.25 L, 84% predicted.  FEV1 was 2.85 L, 74% predicted.  FEV1/FVC ratio was 67%.  There was no significant response to bronchodilators.  Flow volume loop was consistent with obstruction.    Lung volumes showed air trapping with residual volume of 4.09 L, 168% predicted.  Total lung capacity was normal at 114% predicted.    Diffusion capacity was slightly increased to 134% of predicted.    Impression:  The patient had moderate obstructive ventilatory defect with decreased FEV1/FVC ratio at 6 7% of predicted.  These findings are consistent with a patient listed diagnosis of COPD.  Clinical correlation is required.

## 2019-07-02 PROCEDURE — 94729 DIFFUSING CAPACITY: CPT | Performed by: INTERNAL MEDICINE

## 2019-07-02 PROCEDURE — 94060 EVALUATION OF WHEEZING: CPT | Performed by: INTERNAL MEDICINE

## 2019-07-02 PROCEDURE — 94726 PLETHYSMOGRAPHY LUNG VOLUMES: CPT | Performed by: INTERNAL MEDICINE

## 2019-07-22 ENCOUNTER — TELEPHONE (OUTPATIENT)
Dept: CARDIOLOGY | Facility: MEDICAL CENTER | Age: 64
End: 2019-07-22

## 2019-07-22 DIAGNOSIS — E78.5 HYPERLIPIDEMIA, UNSPECIFIED HYPERLIPIDEMIA TYPE: ICD-10-CM

## 2019-07-22 DIAGNOSIS — I25.10 CORONARY ARTERY DISEASE INVOLVING NATIVE HEART WITHOUT ANGINA PECTORIS, UNSPECIFIED VESSEL OR LESION TYPE: ICD-10-CM

## 2019-07-22 NOTE — TELEPHONE ENCOUNTER
TIFFANY/Sandhya    Patient had Echo done today and was advised by the technician to call our office as his heart rate was running in 40's during exam. Patient would please like a call back at [phone].

## 2019-07-22 NOTE — TELEPHONE ENCOUNTER
Pt reports that when he was having echo done in [place] today they told him that his HR was maintaining in the mid 40s. He went home and has been checking it with his pulse oximeter, and it has been mid 40s-lower 50s. He has remained asymptomatic, but he thinks he normally runs in the 60s. He doesn't have a BP cuff that works, so he isn't sure what his BP has been running. Discussed ED precautions in the event he develops symptoms with his low HR. Encouraged him to monitor it regularly and let us know if it doesn't improve.    To FK

## 2019-07-23 RX ORDER — METOPROLOL SUCCINATE 50 MG/1
25 TABLET, EXTENDED RELEASE ORAL DAILY
Qty: 90 TAB | Refills: 3 | COMMUNITY
Start: 2019-07-23 | End: 2019-09-04 | Stop reason: SDUPTHER

## 2019-07-23 NOTE — TELEPHONE ENCOUNTER
Called and advised pt to cut his Toprol tabs in half and take 25mg daily instead of 50mg. Encouraged him to continue monitoring HR and start monitoring BP to make sure it doesn't increase too much with lower dose of Toprol. Pt verbalizes understanding and is appreciative of call. MAR updated.

## 2019-07-26 DIAGNOSIS — I10 ESSENTIAL HYPERTENSION, BENIGN: ICD-10-CM

## 2019-07-26 DIAGNOSIS — I25.10 CORONARY ARTERY DISEASE DUE TO CALCIFIED CORONARY LESION: ICD-10-CM

## 2019-07-26 DIAGNOSIS — I25.84 CORONARY ARTERY DISEASE DUE TO CALCIFIED CORONARY LESION: ICD-10-CM

## 2019-08-01 DIAGNOSIS — I10 ESSENTIAL HYPERTENSION: ICD-10-CM

## 2019-08-01 RX ORDER — BENAZEPRIL HYDROCHLORIDE 20 MG/1
20 TABLET ORAL DAILY
Qty: 90 TAB | Refills: 3 | Status: SHIPPED | OUTPATIENT
Start: 2019-08-01

## 2019-08-19 ENCOUNTER — HOSPITAL ENCOUNTER (INPATIENT)
Dept: HOSPITAL 8 - ED | Age: 64
LOS: 2 days | Discharge: HOME | DRG: 246 | End: 2019-08-21
Attending: INTERNAL MEDICINE | Admitting: INTERNAL MEDICINE
Payer: COMMERCIAL

## 2019-08-19 VITALS — HEIGHT: 73 IN | BODY MASS INDEX: 34.97 KG/M2 | WEIGHT: 263.89 LBS

## 2019-08-19 DIAGNOSIS — Z88.5: ICD-10-CM

## 2019-08-19 DIAGNOSIS — I25.10: ICD-10-CM

## 2019-08-19 DIAGNOSIS — I21.19: Primary | ICD-10-CM

## 2019-08-19 DIAGNOSIS — I11.0: ICD-10-CM

## 2019-08-19 DIAGNOSIS — E03.9: ICD-10-CM

## 2019-08-19 DIAGNOSIS — E78.5: ICD-10-CM

## 2019-08-19 DIAGNOSIS — J44.9: ICD-10-CM

## 2019-08-19 DIAGNOSIS — Z95.1: ICD-10-CM

## 2019-08-19 DIAGNOSIS — Z95.5: ICD-10-CM

## 2019-08-19 DIAGNOSIS — F17.210: ICD-10-CM

## 2019-08-19 DIAGNOSIS — I50.31: ICD-10-CM

## 2019-08-19 PROCEDURE — C1887 CATHETER, GUIDING: HCPCS

## 2019-08-19 PROCEDURE — 99157 MOD SED OTHER PHYS/QHP EA: CPT

## 2019-08-19 PROCEDURE — 4A023N7 MEASUREMENT OF CARDIAC SAMPLING AND PRESSURE, LEFT HEART, PERCUTANEOUS APPROACH: ICD-10-PCS | Performed by: INTERNAL MEDICINE

## 2019-08-19 PROCEDURE — 99285 EMERGENCY DEPT VISIT HI MDM: CPT

## 2019-08-19 PROCEDURE — 80061 LIPID PANEL: CPT

## 2019-08-19 PROCEDURE — 85610 PROTHROMBIN TIME: CPT

## 2019-08-19 PROCEDURE — 93458 L HRT ARTERY/VENTRICLE ANGIO: CPT

## 2019-08-19 PROCEDURE — C1874 STENT, COATED/COV W/DEL SYS: HCPCS

## 2019-08-19 PROCEDURE — C8929 TTE W OR WO FOL WCON,DOPPLER: HCPCS

## 2019-08-19 PROCEDURE — B3101ZZ FLUOROSCOPY OF THORACIC AORTA USING LOW OSMOLAR CONTRAST: ICD-10-PCS | Performed by: INTERNAL MEDICINE

## 2019-08-19 PROCEDURE — B2111ZZ FLUOROSCOPY OF MULTIPLE CORONARY ARTERIES USING LOW OSMOLAR CONTRAST: ICD-10-PCS | Performed by: INTERNAL MEDICINE

## 2019-08-19 PROCEDURE — B2121ZZ FLUOROSCOPY OF SINGLE CORONARY ARTERY BYPASS GRAFT USING LOW OSMOLAR CONTRAST: ICD-10-PCS | Performed by: INTERNAL MEDICINE

## 2019-08-19 PROCEDURE — 85730 THROMBOPLASTIN TIME PARTIAL: CPT

## 2019-08-19 PROCEDURE — C1769 GUIDE WIRE: HCPCS

## 2019-08-19 PROCEDURE — 36415 COLL VENOUS BLD VENIPUNCTURE: CPT

## 2019-08-19 PROCEDURE — 93005 ELECTROCARDIOGRAM TRACING: CPT

## 2019-08-19 PROCEDURE — 85018 HEMOGLOBIN: CPT

## 2019-08-19 PROCEDURE — 85025 COMPLETE CBC W/AUTO DIFF WBC: CPT

## 2019-08-19 PROCEDURE — C1725 CATH, TRANSLUMIN NON-LASER: HCPCS

## 2019-08-19 PROCEDURE — 027035Z DILATION OF CORONARY ARTERY, ONE ARTERY WITH TWO DRUG-ELUTING INTRALUMINAL DEVICES, PERCUTANEOUS APPROACH: ICD-10-PCS | Performed by: INTERNAL MEDICINE

## 2019-08-19 PROCEDURE — 87081 CULTURE SCREEN ONLY: CPT

## 2019-08-19 PROCEDURE — 84484 ASSAY OF TROPONIN QUANT: CPT

## 2019-08-19 PROCEDURE — 99156 MOD SED OTH PHYS/QHP 5/>YRS: CPT

## 2019-08-19 PROCEDURE — 80048 BASIC METABOLIC PNL TOTAL CA: CPT

## 2019-08-19 PROCEDURE — 93567 NJX CAR CTH SPRVLV AORTGRPHY: CPT

## 2019-08-19 PROCEDURE — B2151ZZ FLUOROSCOPY OF LEFT HEART USING LOW OSMOLAR CONTRAST: ICD-10-PCS | Performed by: INTERNAL MEDICINE

## 2019-08-19 PROCEDURE — 80047 BASIC METABLC PNL IONIZED CA: CPT

## 2019-08-19 PROCEDURE — 85014 HEMATOCRIT: CPT

## 2019-08-19 PROCEDURE — 84443 ASSAY THYROID STIM HORMONE: CPT

## 2019-08-19 PROCEDURE — C1894 INTRO/SHEATH, NON-LASER: HCPCS

## 2019-08-21 VITALS — SYSTOLIC BLOOD PRESSURE: 116 MMHG | DIASTOLIC BLOOD PRESSURE: 74 MMHG

## 2019-08-21 DIAGNOSIS — I25.10 CORONARY ARTERY DISEASE INVOLVING NATIVE HEART WITHOUT ANGINA PECTORIS, UNSPECIFIED VESSEL OR LESION TYPE: ICD-10-CM

## 2019-08-21 DIAGNOSIS — E78.5 HYPERLIPIDEMIA, UNSPECIFIED HYPERLIPIDEMIA TYPE: ICD-10-CM

## 2019-08-22 DIAGNOSIS — I25.10 CORONARY ARTERY DISEASE DUE TO CALCIFIED CORONARY LESION: ICD-10-CM

## 2019-08-22 DIAGNOSIS — I25.84 CORONARY ARTERY DISEASE DUE TO CALCIFIED CORONARY LESION: ICD-10-CM

## 2019-08-22 DIAGNOSIS — I21.3 ST ELEVATION MYOCARDIAL INFARCTION (STEMI), UNSPECIFIED ARTERY (HCC): ICD-10-CM

## 2019-08-22 RX ORDER — POTASSIUM CHLORIDE 750 MG/1
10 TABLET, EXTENDED RELEASE ORAL DAILY
Qty: 90 TAB | Refills: 3 | Status: ON HOLD | OUTPATIENT
Start: 2019-08-22 | End: 2020-04-14

## 2019-08-22 RX ORDER — EZETIMIBE 10 MG/1
TABLET ORAL
Qty: 90 TAB | Refills: 3 | Status: ON HOLD | OUTPATIENT
Start: 2019-08-22 | End: 2020-04-14

## 2019-08-25 DIAGNOSIS — I25.84 CORONARY ARTERY DISEASE DUE TO CALCIFIED CORONARY LESION: ICD-10-CM

## 2019-08-25 DIAGNOSIS — I21.3 ST ELEVATION MYOCARDIAL INFARCTION (STEMI), UNSPECIFIED ARTERY (HCC): ICD-10-CM

## 2019-08-25 DIAGNOSIS — I25.10 CORONARY ARTERY DISEASE DUE TO CALCIFIED CORONARY LESION: ICD-10-CM

## 2019-08-26 RX ORDER — DOXAZOSIN MESYLATE 4 MG/1
TABLET ORAL
Qty: 90 TAB | Refills: 3 | Status: ON HOLD | OUTPATIENT
Start: 2019-08-26 | End: 2020-04-14

## 2019-08-28 DIAGNOSIS — I21.3 ST ELEVATION MYOCARDIAL INFARCTION (STEMI), UNSPECIFIED ARTERY (HCC): ICD-10-CM

## 2019-08-28 DIAGNOSIS — I25.84 CORONARY ARTERY DISEASE DUE TO CALCIFIED CORONARY LESION: ICD-10-CM

## 2019-08-28 DIAGNOSIS — I25.10 CORONARY ARTERY DISEASE DUE TO CALCIFIED CORONARY LESION: ICD-10-CM

## 2019-08-28 DIAGNOSIS — E78.5 HYPERLIPIDEMIA, UNSPECIFIED HYPERLIPIDEMIA TYPE: ICD-10-CM

## 2019-08-29 RX ORDER — FUROSEMIDE 20 MG/1
TABLET ORAL
Qty: 90 TAB | Refills: 3 | Status: ON HOLD | OUTPATIENT
Start: 2019-08-29 | End: 2020-04-14

## 2019-08-29 RX ORDER — ATORVASTATIN CALCIUM 80 MG/1
TABLET, FILM COATED ORAL
Qty: 90 TAB | Refills: 3 | Status: ON HOLD | OUTPATIENT
Start: 2019-08-29 | End: 2020-04-14

## 2019-09-04 DIAGNOSIS — I25.10 CORONARY ARTERY DISEASE INVOLVING NATIVE HEART WITHOUT ANGINA PECTORIS, UNSPECIFIED VESSEL OR LESION TYPE: ICD-10-CM

## 2019-09-04 DIAGNOSIS — E78.5 HYPERLIPIDEMIA, UNSPECIFIED HYPERLIPIDEMIA TYPE: ICD-10-CM

## 2019-09-05 ENCOUNTER — OFFICE VISIT (OUTPATIENT)
Dept: PULMONOLOGY | Facility: HOSPICE | Age: 64
End: 2019-09-05
Payer: COMMERCIAL

## 2019-09-05 VITALS
WEIGHT: 265 LBS | HEART RATE: 68 BPM | SYSTOLIC BLOOD PRESSURE: 110 MMHG | RESPIRATION RATE: 16 BRPM | BODY MASS INDEX: 34.01 KG/M2 | HEIGHT: 74 IN | DIASTOLIC BLOOD PRESSURE: 76 MMHG | OXYGEN SATURATION: 95 %

## 2019-09-05 DIAGNOSIS — G47.00 INSOMNIA, UNSPECIFIED TYPE: ICD-10-CM

## 2019-09-05 DIAGNOSIS — J44.9 CHRONIC OBSTRUCTIVE PULMONARY DISEASE, UNSPECIFIED COPD TYPE (HCC): ICD-10-CM

## 2019-09-05 DIAGNOSIS — G47.31 CENTRAL SLEEP APNEA: ICD-10-CM

## 2019-09-05 PROCEDURE — 99214 OFFICE O/P EST MOD 30 MIN: CPT | Performed by: PHYSICIAN ASSISTANT

## 2019-09-05 RX ORDER — METOPROLOL SUCCINATE 50 MG/1
TABLET, EXTENDED RELEASE ORAL
Qty: 90 TAB | Refills: 3 | Status: SHIPPED | OUTPATIENT
Start: 2019-09-05 | End: 2020-03-05

## 2019-09-05 RX ORDER — TEMAZEPAM 15 MG/1
CAPSULE ORAL
Qty: 30 CAP | Refills: 3 | Status: SHIPPED | OUTPATIENT
Start: 2019-09-05 | End: 2019-10-05

## 2019-09-05 ASSESSMENT — ENCOUNTER SYMPTOMS
CLAUDICATION: 0
WHEEZING: 1
COUGH: 1
SINUS PAIN: 0
ORTHOPNEA: 0
SHORTNESS OF BREATH: 1
SPUTUM PRODUCTION: 1
FEVER: 0
HEARTBURN: 1
WEIGHT LOSS: 0
HEADACHES: 0
ROS GI COMMENTS: NO DENTURES, NO DIFFICULTY SWALLOWING
EYES NEGATIVE: 1
CHILLS: 0
DIZZINESS: 1
PALPITATIONS: 0
SORE THROAT: 0

## 2019-09-05 NOTE — PATIENT INSTRUCTIONS
1-reviewed PFT test  2-reviewed compliance report for Bipap  3-reviewed pulmonary medications  4-renewed temazepam  5-follow up in one year

## 2019-09-05 NOTE — PROGRESS NOTES
CC: Recent MI     HPI:  Norberto White is a 64 y.o. year old male here today for follow-up on COPD, insomnia and central sleep apnea.  Last seen in clinic 7/9/2018 by ALANNA Young.  He reports that he ordinarily is seen annually.  He will need to be seen annually at sleep clinic as well.  Patient is a former smoker with reported quit date in 1999 and 40-pack-year history.  Continued abstinence advised.    Pertinent past medical history includes fifth MI 2 weeks ago with 2 stents placed.  Additional history of STEMI, diastolic heart failure, cardiomyopathy, thoracentesis in 2015.    Reviewed in clinic vitals including blood pressure 110/76, heart rate of 68, O2 sat of 95%. Patient's body mass index is 34.02 kg/m². Exercise and nutrition counseling were performed at this visit.  His weight is actually down from BMI of 37 kg/m².    Reviewed home medication regimen including Lasix, potassium, Advair, Spiriva HandiHaler, albuterol rescue inhaler, nitroglycerin, omeprazole, Restoril which she has taken nightly for many years.  Patient previously declined referral to sleep specialist as he has never been able to shut off his mind to sleep.    Reviewed most recent imaging including echo obtained 7/26/2019 demonstrating an estimated LVEF of 50% predicted, hypokinesis of the inferior lateral wall, akinesis of the apical inferior lateral wall, grade 2 diastolic dysfunction, right ventricular systolic function mildly decreased, mild aortic regurgitation.    Most recent x-ray in the EMR 12/12/2016 demonstrating mildly enlarged cardiac silhouette which is stable, no evidence of acute cardiopulmonary process at that time.  Median sternotomy wires in place.    Patient did have pulmonary function testing obtained 6/26/2019, difficult examination due to vasovagal episodes following forced vital capacity, multiple attempts were kept to a minimum as a result.  This result was compared to last PFT obtained 6/19/2018 with  FEV1 of 2.88 L or 76% predicted as compared to 68%, FVC of 4.12 L or 82% predicted was 79% prior, FEV1/FVC ratio of 70 was 65 prior, no significant postbronchodilator response, residual volume 168% predicted was 175% prior, % predicted was 119% prior, DLCO 134% predicted was 120% prior.  Per pulmonologist interpretation moderate obstructive ventilatory defect with decreased FEV1/FVC ratio, findings consistent with listed diagnosis of COPD.    Reviewed compliance report demonstrating usage of 28 out of 30 days, 2 days not used for in the hospital, average usage 8 hours and 35 minutes/day average nightly percentage in large leak 1.9% or about 10 minutes, average AHI 5.4.     Review of Systems   Constitutional: Positive for malaise/fatigue. Negative for chills, fever and weight loss.   HENT: Negative for congestion, hearing loss, nosebleeds, sinus pain, sore throat and tinnitus.    Eyes: Negative.         Prescription glasses    Respiratory: Positive for cough, sputum production (clear to white), shortness of breath (with activity) and wheezing (occasional ).    Cardiovascular: Positive for leg swelling (Right greater than left due to recently twisting right ankle). Negative for chest pain, palpitations, orthopnea and claudication.   Gastrointestinal: Positive for heartburn (no break through symptoms).        No dentures, no difficulty swallowing    Skin: Negative.    Neurological: Positive for dizziness (with position change). Negative for headaches.   Psychiatric/Behavioral: The patient has insomnia (takes temazepam).        Past Medical History:   Diagnosis Date   • Acute MI, inferolateral wall, initial episode of care ([facility]) 12/23/2013   • Arthritis    • ASTHMA     inhaler daily   • Back pain    • Breath shortness 2013 24/7   • CAD (coronary artery disease) 2/13/2014    Bare-metal stent to the circumflex in December 2013. Prior stent to the LAD diagonal had mild in-stent restenosis. 30% disease is noted in  the right coronary artery.   • Chronic airway obstruction, not elsewhere classified    • EMPHYSEMA    • Essential hypertension, benign 2/13/2014   • HLD (hyperlipidemia) 2/13/2014   • Hypertension 2013    states well controlled on meds   • Infectious disease     10/5/14 daughter and granddaughter had colds   • ADRYAN on CPAP 6/28/2017   • Other and unspecified angina pectoris    • PAF (paroxysmal atrial fibrillation) ([facility]) 7/26/2015   • Pain 2013    6/10   • Pain 2014    4/10 neck   • Pneumonia    • Supplemental oxygen dependent     2.5 liters at night   • Unspecified disorder of thyroid 2010    on synthroid   • Unspecified hemorrhagic conditions     bruises easily related to plavix       Past Surgical History:   Procedure Laterality Date   • MULTIPLE CORONARY ARTERY BYPASS ENDO VEIN HARVEST N/A 7/23/2015    Procedure: MULTIPLE CORONARY ARTERY BYPASS ENDO VEIN HARVEST;  Surgeon: Deuce Tam M.D.;  Location: SURGERY [facility];  Service:    • CERVICAL DISK AND FUSION ANTERIOR  10/13/2014    Performed by Moises Kerns M.D. at SURGERY [facility]   • OTHER ORTHOPEDIC SURGERY  2012    right knee   • OTHER CARDIAC SURGERY  2008    cardiac stents   • GYN SURGERY         Family History   Problem Relation Age of Onset   • Heart Attack Father    • Cancer Mother        Social History     Socioeconomic History   • Marital status:      Spouse name: Not on file   • Number of children: Not on file   • Years of education: Not on file   • Highest education level: Not on file   Occupational History   • Not on file   Social Needs   • Financial resource strain: Not on file   • Food insecurity:     Worry: Not on file     Inability: Not on file   • Transportation needs:     Medical: Not on file     Non-medical: Not on file   Tobacco Use   • Smoking status: Former Smoker     Packs/day: 2.00     Years: 20.00     Pack years: 40.00     Types: Cigarettes, Cigars     Last attempt to quit: 10/7/1999     Years since quitting:  "19.9   • Smokeless tobacco: Never Used   Substance and Sexual Activity   • Alcohol use: No   • Drug use: No   • Sexual activity: Not on file   Lifestyle   • Physical activity:     Days per week: Not on file     Minutes per session: Not on file   • Stress: Not on file   Relationships   • Social connections:     Talks on phone: Not on file     Gets together: Not on file     Attends Cheondoism service: Not on file     Active member of club or organization: Not on file     Attends meetings of clubs or organizations: Not on file     Relationship status: Not on file   • Intimate partner violence:     Fear of current or ex partner: Not on file     Emotionally abused: Not on file     Physically abused: Not on file     Forced sexual activity: Not on file   Other Topics Concern   • Not on file   Social History Narrative   • Not on file       Allergies as of 09/05/2019 - Reviewed 09/05/2019   Allergen Reaction Noted   • Morphine Anxiety and Unspecified 10/14/2014        @Vital signs for this encounter:  Vitals:    09/05/19 1244   Height: 1.88 m (6' 2\")   Weight: 120.2 kg (265 lb)   Weight % change since last entry.: 0 %   BP: 110/76   Pulse: 68   BMI (Calculated): 34.02   Resp: 16       Current medications as of today   Current Outpatient Medications   Medication Sig Dispense Refill   • furosemide (LASIX) 20 MG Tab TAKE 1 TABLET DAILY 90 Tab 3   • atorvastatin (LIPITOR) 80 MG tablet TAKE 1 TABLET DAILY 90 Tab 3   • doxazosin (CARDURA) 4 MG Tab TAKE 1 TABLET DAILY 90 Tab 3   • ezetimibe (ZETIA) 10 MG Tab TAKE 1 TABLET DAILY 90 Tab 3   • potassium chloride SA (K-DUR) 10 MEQ Tab CR Take 1 Tab by mouth every day. 90 Tab 3   • benazepril (LOTENSIN) 20 MG Tab Take 1 Tab by mouth every day. 90 Tab 3   • metoprolol SR (TOPROL XL) 50 MG TABLET SR 24 HR Take 0.5 Tabs by mouth every day. 90 Tab 3   • ADVAIR DISKUS 500-50 MCG/DOSE AEROSOL POWDER, BREATH ACTIVATED USE 1 PUFF EVERY 12 HOURS 3 Inhaler 3   • SPIRIVA HANDIHALER 18 MCG Cap " INHALE THE CONTENTS OF 1  CAPSULE VIA HANDIHALER  DAILY 90 Cap 3   • Albuterol Sulfate (PROAIR RESPICLICK) 108 (90 Base) MCG/ACT AEROSOL POWDER, BREATH ACTIVATED Inhale 2 Puffs by mouth as needed (for shortness of breath, wheezing.). every 4-6 hours as needed. 1 Each 3   • nitroglycerin (NITROSTAT) 0.4 MG SL Tab Place 1 Tab under tongue as needed for Chest Pain (Place 1 tablet under the tongue every 5 minutes up to 3 doses as needed for chest pain). 75 Tab 3   • omeprazole (PRILOSEC) 40 MG delayed-release capsule 40 mg.     • levothyroxine (SYNTHROID) 75 MCG Tab Take 75 mcg by mouth daily.     • FLUARIX QUADRIVALENT 0.5 ML Suspension Prefilled Syringe injection inject 0.5 milliliter intramuscularly  0   • PREVNAR 13 syringe inject 0.5 milliliter intramuscularly  0   • PREVIDENT 5000 SENSITIVE 1.1-5 % Paste   0   • tizanidine (ZANAFLEX) 4 MG Tab Take 4 mg by mouth every 6 hours as needed.     • Melatonin 10 MG Tab Take  by mouth.     • Cranberry 500 MG Cap Take  by mouth.     • Zinc 50 MG Cap Take 50 mg by mouth every day.     • diphenhydrAMINE (BENADRYL) 25 MG Tab Take 25 mg by mouth every 6 hours as needed for Sleep.     • KRILL OIL PO Take  by mouth.     • calcium polycarbophil (FIBERCON) 625 MG Tab Take 625 mg by mouth every day.     • vitamin D (CHOLECALCIFEROL) 1000 UNIT Tab Take 1,000 Units by mouth every day.     • Flaxseed, Linseed, (FLAXSEED OIL PO) Take  by mouth.     • hydrocodone-acetaminophen (NORCO) 5-325 MG TABS per tablet Take 1-2 Tabs by mouth every four hours as needed ((Pain Scale 1-3) Give 1 tablet first, may give second tablet after 1 hour if pain still unrelieved.). 120 Tab 0   • aspirin EC (ECOTRIN) 81 MG TBEC Take 81 mg by mouth every day.     • tamsulosin (FLOMAX) 0.4 MG capsule Take 0.4 mg by mouth ONE-HALF HOUR AFTER BREAKFAST.       No current facility-administered medications for this visit.          Physical Exam:   Gen:           Alert and oriented, No apparent distress. Mood and  affect     appropriate, normal interaction with provider.  Eyes:          sclere white, conjunctive moist.  Hearing:     Grossly intact.  Dentition:    Good dentition.  Oropharynx:   Tongue normal, posterior pharynx without erythema or exudate.  Neck:        Supple, trachea midline, no masses.  Respiratory Effort: No intercostal retractions or use of accessory muscles.   Lung Auscultation:      Diminished otherwise clear to auscultation bilaterally; no rales, rhonchi or wheezing.  CV:            Regular rate and rhythm.  Lower extremity edema, 2+ right, 1+ left. No murmurs, rubs or gallops.  Digits, Nails, Ext: No clubbing, cyanosis, petechiae, or nodes.   Skin:        No rashes, lesions or ulcers noted on back or exposed skin    surfaces.                     Assessment:  1. Chronic obstructive pulmonary disease, unspecified COPD type (HCC)  PULMONARY FUNCTION TESTS -Test requested: Complete Pulmonary Function Test (patient vasovagals )   2. Insomnia, unspecified type  temazepam (RESTORIL) 15 MG Cap   3. Central sleep apnea   continue ASV, follow-up sleep center       Immunizations:    Flu: 12/17/2018, update recommended in September/October 2019  Pneumovax 23: Recommended  Prevnar 13: Recommended    Plan:  1-reviewed PFT test  2-reviewed compliance report for Bipap  3-reviewed pulmonary medications  4-renewed temazepam  5-follow up in one year pulmonary clinic  6-follow-up annually at sleep center    This dictation was created using voice recognition software. The accuracy of the dictation is limited to the abilities of the software. I expect there may be some errors of grammar and possibly content.

## 2019-09-08 ASSESSMENT — ENCOUNTER SYMPTOMS: INSOMNIA: 1

## 2019-11-03 NOTE — TELEPHONE ENCOUNTER
----- Message from Akhildannyyolette Hernandez sent at 4/24/2018 10:19 AM PDT -----  Regarding: Out of all medication's needs refill asap   Contact: [phone]    Marci Wild    Pt of  states he is completely out of all 7 of his medication's and need's refill's sent to [facility] asap. He is requesting a call back when completed at [phone].    Thank You   
Pharmacy called ([phone])  Medications confirmed with pharmacy    
abnormal fetal heart rate tracing

## 2019-12-09 ENCOUNTER — TELEPHONE (OUTPATIENT)
Dept: CARDIOLOGY | Facility: MEDICAL CENTER | Age: 64
End: 2019-12-09

## 2020-01-20 ENCOUNTER — TELEPHONE (OUTPATIENT)
Dept: SLEEP MEDICINE | Facility: MEDICAL CENTER | Age: 65
End: 2020-01-20

## 2020-01-21 NOTE — TELEPHONE ENCOUNTER
Norberto called from his cell phone.  Explained that he is being admitted overnight at the [facility] and is need of his BPAP settings for the respiratory therapist to set a machine for him, once they find a BPAP machine for him to use.    Gave him the settings for his BPAP autoSV:   Max pressure 20   EPAP 7/15   PS 3/13    Advised if further information is needed tonight, please call [phone].  I can fax the most recent compliance report that shows the applied settings.  
patient/guardian displays adequate decision making...

## 2020-02-17 DIAGNOSIS — G47.00 INSOMNIA, UNSPECIFIED TYPE: ICD-10-CM

## 2020-02-17 DIAGNOSIS — F51.01 PRIMARY INSOMNIA: ICD-10-CM

## 2020-02-18 RX ORDER — TEMAZEPAM 15 MG/1
15 CAPSULE ORAL NIGHTLY PRN
Qty: 30 CAP | Refills: 2 | Status: SHIPPED
Start: 2020-02-18 | End: 2020-03-31 | Stop reason: SDUPTHER

## 2020-02-18 NOTE — TELEPHONE ENCOUNTER
Pt called regarding his RF request.  Notified pt we have received it and our office was closed yesterday.  Told pt I will call him we it has been approved. Pt understood.

## 2020-02-18 NOTE — TELEPHONE ENCOUNTER
Have we ever prescribed this med? Yes.  If yes, what date? 10/16/19(Mario)    Last OV: 09/05/19 with Juliet Martin PA-C    Next OV: 09/08/2020 with Juliet Martin PA-C    DX: Insomnia, unspecified type (G47.00)    Medications:   Requested Prescriptions     Pending Prescriptions Disp Refills   • temazepam (RESTORIL) 15 MG Cap [Pharmacy Med Name: TEMAZEPAM 15MG CAPSULES] 30 Cap      Sig: TAKE ONE CAPSULE BY MOUTH AT BEDTIME AS NEEDED FOR SLEEP

## 2020-03-05 RX ORDER — METOPROLOL SUCCINATE 25 MG/1
25 TABLET, EXTENDED RELEASE ORAL DAILY
Status: ON HOLD | COMMUNITY
Start: 2020-02-16 | End: 2020-06-22

## 2020-03-05 RX ORDER — TICAGRELOR 90 MG/1
90 TABLET ORAL 2 TIMES DAILY
COMMUNITY
Start: 2020-02-16

## 2020-03-05 NOTE — OR NURSING
PreAdmit Appointment: Patient given Preparing for your procedure handout. Patient instructed to continue regularly prescribed medications through day before surgery. Instructed to take the following medications the day of surgery with a sip of water per Anesthesia protocol: Patient instructed to use inhalers and nitro if needed. Patient instructed to take Lipitor, Cardura, Synthroid, Toprol, Prilosec and Flomax day of surgery. Patient instructed to use Tylenol if needed for pain. Patient has Sleep Apnea and uses Bipap. Patient instructed to bring Bipap and inhaler day of surgery. Patient has a cardiac history and has not seen his Cardiologist Dr. Das since August 2019 post MI and 2 stents placed. Dr. Sung emailed. Labs and EKG to be done day of surgery. Patient states he is off his blood thinner.      Patient Norberto White MRN # [id number] having EUS/EGD/biopsies for Esophageal Cancer with Dr. Cardenas. Patient has extensive Cardiac history. His last MI was 8/2019 in which 2 stents were placed. Patient has not seen cardiologist since 8/2019, states no chest pain or any issues post procedure. Patient does not have a cardiac clearance and surgery is tomorrow. He is coming from [place]. Patient also has COPD, Sleep Apnea (uses Bipap), HTN, high cholesterol (multiple meds). Labs and EKG will be Day of Surgery.    It is difficult to assess a patient this late in the game given particularly this history. I recommend that the assigned anesthesiologist decide whether to proceed or not after a clinical evaluation preoperatively. It’s too bad that we don’t have a mechanism to catch these patients earlier, particularly those who are coming a significant distance to have a procedure done. Thank you.

## 2020-03-06 ENCOUNTER — ANESTHESIA EVENT (OUTPATIENT)
Dept: SURGERY | Facility: MEDICAL CENTER | Age: 65
End: 2020-03-06
Payer: COMMERCIAL

## 2020-03-06 ENCOUNTER — HOSPITAL ENCOUNTER (OUTPATIENT)
Facility: MEDICAL CENTER | Age: 65
End: 2020-03-06
Attending: INTERNAL MEDICINE | Admitting: INTERNAL MEDICINE
Payer: COMMERCIAL

## 2020-03-06 ENCOUNTER — ANESTHESIA (OUTPATIENT)
Dept: SURGERY | Facility: MEDICAL CENTER | Age: 65
End: 2020-03-06
Payer: COMMERCIAL

## 2020-03-06 VITALS
TEMPERATURE: 97.2 F | HEIGHT: 74 IN | HEART RATE: 89 BPM | DIASTOLIC BLOOD PRESSURE: 81 MMHG | RESPIRATION RATE: 17 BRPM | OXYGEN SATURATION: 93 % | BODY MASS INDEX: 31.35 KG/M2 | WEIGHT: 244.27 LBS | SYSTOLIC BLOOD PRESSURE: 130 MMHG

## 2020-03-06 LAB
ANION GAP SERPL CALC-SCNC: 11 MMOL/L (ref 7–16)
BASOPHILS # BLD AUTO: 0.4 % (ref 0–1.8)
BASOPHILS # BLD: 0.03 K/UL (ref 0–0.12)
BUN SERPL-MCNC: 18 MG/DL (ref 8–22)
CALCIUM SERPL-MCNC: 8.8 MG/DL (ref 8.4–10.2)
CHLORIDE SERPL-SCNC: 105 MMOL/L (ref 96–112)
CO2 SERPL-SCNC: 22 MMOL/L (ref 20–33)
CREAT SERPL-MCNC: 0.97 MG/DL (ref 0.5–1.4)
EKG IMPRESSION: NORMAL
EOSINOPHIL # BLD AUTO: 0.13 K/UL (ref 0–0.51)
EOSINOPHIL NFR BLD: 1.8 % (ref 0–6.9)
ERYTHROCYTE [DISTWIDTH] IN BLOOD BY AUTOMATED COUNT: 46.5 FL (ref 35.9–50)
GLUCOSE SERPL-MCNC: 88 MG/DL (ref 65–99)
HCT VFR BLD AUTO: 43.4 % (ref 42–52)
HGB BLD-MCNC: 14.7 G/DL (ref 14–18)
IMM GRANULOCYTES # BLD AUTO: 0.01 K/UL (ref 0–0.11)
IMM GRANULOCYTES NFR BLD AUTO: 0.1 % (ref 0–0.9)
LYMPHOCYTES # BLD AUTO: 1.63 K/UL (ref 1–4.8)
LYMPHOCYTES NFR BLD: 22.6 % (ref 22–41)
MCH RBC QN AUTO: 30.6 PG (ref 27–33)
MCHC RBC AUTO-ENTMCNC: 33.9 G/DL (ref 33.7–35.3)
MCV RBC AUTO: 90.2 FL (ref 81.4–97.8)
MONOCYTES # BLD AUTO: 0.82 K/UL (ref 0–0.85)
MONOCYTES NFR BLD AUTO: 11.4 % (ref 0–13.4)
NEUTROPHILS # BLD AUTO: 4.59 K/UL (ref 1.82–7.42)
NEUTROPHILS NFR BLD: 63.7 % (ref 44–72)
NRBC # BLD AUTO: 0 K/UL
NRBC BLD-RTO: 0 /100 WBC
PATHOLOGY CONSULT NOTE: NORMAL
PLATELET # BLD AUTO: 166 K/UL (ref 164–446)
PMV BLD AUTO: 10.8 FL (ref 9–12.9)
POTASSIUM SERPL-SCNC: 4.2 MMOL/L (ref 3.6–5.5)
RBC # BLD AUTO: 4.81 M/UL (ref 4.7–6.1)
SODIUM SERPL-SCNC: 138 MMOL/L (ref 135–145)
WBC # BLD AUTO: 7.2 K/UL (ref 4.8–10.8)

## 2020-03-06 PROCEDURE — 85025 COMPLETE CBC W/AUTO DIFF WBC: CPT

## 2020-03-06 PROCEDURE — 88312 SPECIAL STAINS GROUP 1: CPT

## 2020-03-06 PROCEDURE — 160002 HCHG RECOVERY MINUTES (STAT): Performed by: INTERNAL MEDICINE

## 2020-03-06 PROCEDURE — 160046 HCHG PACU - 1ST 60 MINS PHASE II: Performed by: INTERNAL MEDICINE

## 2020-03-06 PROCEDURE — 160208 HCHG ENDO MINUTES - EA ADDL 1 MIN LEVEL 4: Performed by: INTERNAL MEDICINE

## 2020-03-06 PROCEDURE — 160009 HCHG ANES TIME/MIN: Performed by: INTERNAL MEDICINE

## 2020-03-06 PROCEDURE — 700105 HCHG RX REV CODE 258: Performed by: INTERNAL MEDICINE

## 2020-03-06 PROCEDURE — 160203 HCHG ENDO MINUTES - 1ST 30 MINS LEVEL 4: Performed by: INTERNAL MEDICINE

## 2020-03-06 PROCEDURE — 80048 BASIC METABOLIC PNL TOTAL CA: CPT

## 2020-03-06 PROCEDURE — 160025 RECOVERY II MINUTES (STATS): Performed by: INTERNAL MEDICINE

## 2020-03-06 PROCEDURE — 160048 HCHG OR STATISTICAL LEVEL 1-5: Performed by: INTERNAL MEDICINE

## 2020-03-06 PROCEDURE — 93005 ELECTROCARDIOGRAM TRACING: CPT | Performed by: INTERNAL MEDICINE

## 2020-03-06 PROCEDURE — 93010 ELECTROCARDIOGRAM REPORT: CPT | Performed by: INTERNAL MEDICINE

## 2020-03-06 PROCEDURE — 500066 HCHG BITE BLOCK, ECT: Performed by: INTERNAL MEDICINE

## 2020-03-06 PROCEDURE — 700101 HCHG RX REV CODE 250: Performed by: ANESTHESIOLOGY

## 2020-03-06 PROCEDURE — 160035 HCHG PACU - 1ST 60 MINS PHASE I: Performed by: INTERNAL MEDICINE

## 2020-03-06 PROCEDURE — 160036 HCHG PACU - EA ADDL 30 MINS PHASE I: Performed by: INTERNAL MEDICINE

## 2020-03-06 PROCEDURE — 700111 HCHG RX REV CODE 636 W/ 250 OVERRIDE (IP): Performed by: ANESTHESIOLOGY

## 2020-03-06 PROCEDURE — 88305 TISSUE EXAM BY PATHOLOGIST: CPT

## 2020-03-06 PROCEDURE — 700101 HCHG RX REV CODE 250

## 2020-03-06 RX ORDER — SODIUM CHLORIDE, SODIUM LACTATE, POTASSIUM CHLORIDE, CALCIUM CHLORIDE 600; 310; 30; 20 MG/100ML; MG/100ML; MG/100ML; MG/100ML
INJECTION, SOLUTION INTRAVENOUS CONTINUOUS
Status: DISCONTINUED | OUTPATIENT
Start: 2020-03-06 | End: 2020-03-06 | Stop reason: HOSPADM

## 2020-03-06 RX ORDER — HYDROMORPHONE HYDROCHLORIDE 1 MG/ML
0.1 INJECTION, SOLUTION INTRAMUSCULAR; INTRAVENOUS; SUBCUTANEOUS
Status: DISCONTINUED | OUTPATIENT
Start: 2020-03-06 | End: 2020-03-06 | Stop reason: HOSPADM

## 2020-03-06 RX ORDER — OXYCODONE HCL 5 MG/5 ML
5 SOLUTION, ORAL ORAL
Status: DISCONTINUED | OUTPATIENT
Start: 2020-03-06 | End: 2020-03-06 | Stop reason: HOSPADM

## 2020-03-06 RX ORDER — DIPHENHYDRAMINE HYDROCHLORIDE 50 MG/ML
12.5 INJECTION INTRAMUSCULAR; INTRAVENOUS
Status: DISCONTINUED | OUTPATIENT
Start: 2020-03-06 | End: 2020-03-06 | Stop reason: HOSPADM

## 2020-03-06 RX ORDER — MEPERIDINE HYDROCHLORIDE 25 MG/ML
12.5 INJECTION INTRAMUSCULAR; INTRAVENOUS; SUBCUTANEOUS
Status: DISCONTINUED | OUTPATIENT
Start: 2020-03-06 | End: 2020-03-06 | Stop reason: HOSPADM

## 2020-03-06 RX ORDER — HALOPERIDOL 5 MG/ML
1 INJECTION INTRAMUSCULAR
Status: DISCONTINUED | OUTPATIENT
Start: 2020-03-06 | End: 2020-03-06 | Stop reason: HOSPADM

## 2020-03-06 RX ORDER — ONDANSETRON 2 MG/ML
4 INJECTION INTRAMUSCULAR; INTRAVENOUS
Status: COMPLETED | OUTPATIENT
Start: 2020-03-06 | End: 2020-03-06

## 2020-03-06 RX ORDER — HYDROMORPHONE HYDROCHLORIDE 1 MG/ML
0.4 INJECTION, SOLUTION INTRAMUSCULAR; INTRAVENOUS; SUBCUTANEOUS
Status: DISCONTINUED | OUTPATIENT
Start: 2020-03-06 | End: 2020-03-06 | Stop reason: HOSPADM

## 2020-03-06 RX ORDER — OXYCODONE HCL 5 MG/5 ML
10 SOLUTION, ORAL ORAL
Status: DISCONTINUED | OUTPATIENT
Start: 2020-03-06 | End: 2020-03-06 | Stop reason: HOSPADM

## 2020-03-06 RX ORDER — CEFAZOLIN SODIUM 1 G/3ML
INJECTION, POWDER, FOR SOLUTION INTRAMUSCULAR; INTRAVENOUS PRN
Status: DISCONTINUED | OUTPATIENT
Start: 2020-03-06 | End: 2020-03-06 | Stop reason: SURG

## 2020-03-06 RX ORDER — HYDROMORPHONE HYDROCHLORIDE 1 MG/ML
0.2 INJECTION, SOLUTION INTRAMUSCULAR; INTRAVENOUS; SUBCUTANEOUS
Status: DISCONTINUED | OUTPATIENT
Start: 2020-03-06 | End: 2020-03-06 | Stop reason: HOSPADM

## 2020-03-06 RX ORDER — LIDOCAINE HYDROCHLORIDE 10 MG/ML
INJECTION, SOLUTION INFILTRATION; PERINEURAL
Status: COMPLETED
Start: 2020-03-06 | End: 2020-03-06

## 2020-03-06 RX ADMIN — FENTANYL CITRATE 25 MCG: 50 INJECTION, SOLUTION INTRAMUSCULAR; INTRAVENOUS at 14:06

## 2020-03-06 RX ADMIN — CEFAZOLIN 2 G: 1 INJECTION, POWDER, FOR SOLUTION INTRAVENOUS at 13:50

## 2020-03-06 RX ADMIN — SUCCINYLCHOLINE CHLORIDE 100 MG: 20 INJECTION, SOLUTION INTRAMUSCULAR; INTRAVENOUS at 13:43

## 2020-03-06 RX ADMIN — FENTANYL CITRATE 50 MCG: 50 INJECTION, SOLUTION INTRAMUSCULAR; INTRAVENOUS at 13:46

## 2020-03-06 RX ADMIN — SODIUM CHLORIDE, POTASSIUM CHLORIDE, SODIUM LACTATE AND CALCIUM CHLORIDE: 600; 310; 30; 20 INJECTION, SOLUTION INTRAVENOUS at 12:24

## 2020-03-06 RX ADMIN — PROPOFOL 190 MG: 10 INJECTION, EMULSION INTRAVENOUS at 13:45

## 2020-03-06 RX ADMIN — Medication 0.5 ML: at 12:19

## 2020-03-06 RX ADMIN — MIDAZOLAM HYDROCHLORIDE 2 MG: 1 INJECTION, SOLUTION INTRAMUSCULAR; INTRAVENOUS at 13:43

## 2020-03-06 RX ADMIN — LIDOCAINE HYDROCHLORIDE 0.5 ML: 10 INJECTION, SOLUTION INFILTRATION; PERINEURAL at 12:19

## 2020-03-06 RX ADMIN — FENTANYL CITRATE 50 MCG: 50 INJECTION, SOLUTION INTRAMUSCULAR; INTRAVENOUS at 13:43

## 2020-03-06 RX ADMIN — FENTANYL CITRATE 50 MCG: 50 INJECTION, SOLUTION INTRAMUSCULAR; INTRAVENOUS at 13:45

## 2020-03-06 RX ADMIN — EPHEDRINE SULFATE 10 MG: 50 INJECTION INTRAMUSCULAR; INTRAVENOUS; SUBCUTANEOUS at 13:53

## 2020-03-06 RX ADMIN — SODIUM CHLORIDE, POTASSIUM CHLORIDE, SODIUM LACTATE AND CALCIUM CHLORIDE: 600; 310; 30; 20 INJECTION, SOLUTION INTRAVENOUS at 13:30

## 2020-03-06 RX ADMIN — ONDANSETRON 4 MG: 2 INJECTION INTRAMUSCULAR; INTRAVENOUS at 14:57

## 2020-03-06 RX ADMIN — ROCURONIUM BROMIDE 5 MG: 10 INJECTION INTRAVENOUS at 13:43

## 2020-03-06 RX ADMIN — HALOPERIDOL LACTATE 1 MG: 5 INJECTION, SOLUTION INTRAMUSCULAR at 15:58

## 2020-03-06 ASSESSMENT — PAIN SCALES - GENERAL: PAIN_LEVEL: 3

## 2020-03-06 NOTE — ANESTHESIA PREPROCEDURE EVALUATION
Relevant Problems   PULMONARY   (+) COPD (chronic obstructive pulmonary disease) (HCC)      CARDIAC   (+) Coronary artery disease due to calcified coronary lesion   (+) Coronary artery disease involving native coronary artery of native heart without angina pectoris   (+) Essential hypertension, benign   (+) STEMI (ST elevation myocardial infarction) (HCC)      ENDO   (+) Hypothyroid       Physical Exam    Airway   Mallampati: II  TM distance: >3 FB  Neck ROM: full       Cardiovascular - normal exam  Rhythm: regular  Rate: normal  (-) murmur     Dental - normal exam         Pulmonary - normal exam  Breath sounds clear to auscultation     Abdominal   Abdomen: soft  Bowel sounds: normal   Neurological - normal exam                 Anesthesia Plan    ASA 3   ASA physical status 3 criteria: COPD and CAD/stents (> 3 months)    Plan - general       Airway plan will be ETT                  Informed Consent:

## 2020-03-06 NOTE — ANESTHESIA TIME REPORT
Anesthesia Start and Stop Event Times     Date Time Event    3/6/2020 1327 Ready for Procedure     1330 Anesthesia Start     1442 Anesthesia Stop        Responsible Staff  03/06/20    Name Role Begin End    Manpreet Ojeda M.D. Anesth 1330 1442        Preop Diagnosis (Free Text):  Pre-op Diagnosis     MALIGNANT TUMOR OF ESOPHAGUS        Preop Diagnosis (Codes):  Diagnosis Information     Diagnosis Code(s): Malignant tumor of esophagus (HCC) [C15.9]        Post op Diagnosis  Esophagus cancer (HCC)      Premium Reason  Non-Premium    Comments:

## 2020-03-06 NOTE — DISCHARGE INSTRUCTIONS
ENDOSCOPY HOME CARE INSTRUCTIONS    GASTROSCOPY OR ERCP  1. Don't eat or drink anything for about an hour after the test. You can then resume your regular diet.  2. Don't drive or drink alcohol for 24 hours. The medication you received will make you too drowsy.  3. Don't take any coffee, tea, or aspirin products until after you see your doctor. These can harm the lining of your stomach.  4. If you begin to vomit bloody material, or develop black or bloody stools, call your doctor as soon as possible.  5. If you have any neck, chest, abdominal pain or temp of 100 degrees, call your doctor.  6. See your doctor call for follow up   7. Additional instructions: No aspirin or brillianta for 2 days   Soft diet today, advance as tolerated   8. Prescriptions: Nystatin and Carafate has been electronically sent to your pharmacy     COLONOSCOPY OR FLEXIBLE SIGMOIDOSCOPY  1. If you received a barium enema, take a mild laxative such as dulcolax, Norma's M.O., or Milk of Magnesia to clean out the barium.  2. Drink plenty of fluids. Eat a diet high in fiber (whole-grain breads, fresh fruit and vegetables).  3. You may notice a few drops of blood with your first bowel movement. If you develop any large amount of bleeding, black stools, a fever, or abdominal pain, call your doctor right away.  4. Call your doctor for test results in {SRG NUMBER OF DAYS:4435134} {SRG DAY WEEK MONTH:4912776}.  5. Don't drive or drink alcohol for 24 hours. The medication you received will make you too drowsy.  6. No heavy lifting, no Aspirin products or Aspirin for 5 days ***  7. Additional instructions: ***  8. Prescriptions: ***    You should call 911 if you develop problems with breathing or chest pain.  If any questions arise, call your doctor. If your doctor is not available, please feel free to call {Endoscopy Dept Numbers:26324}. You can also call the [employer] open 24 hours/day, 7 days/week and speak to a nurse at [phone], or toll  jb [phone].    Depression / Suicide Risk    As you are discharged from this [facility] facility, it is important to learn how to keep safe from harming yourself.    Recognize the warning signs:  · Abrupt changes in personality, positive or negative- including increase in energy   · Giving away possessions  · Change in eating patterns- significant weight changes-  positive or negative  · Change in sleeping patterns- unable to sleep or sleeping all the time   · Unwillingness or inability to communicate  · Depression  · Unusual sadness, discouragement and loneliness  · Talk of wanting to die  · Neglect of personal appearance   · Rebelliousness- reckless behavior  · Withdrawal from people/activities they love  · Confusion- inability to concentrate     If you or a loved one observes any of these behaviors or has concerns about self-harm, here's what you can do:  · Talk about it- your feelings and reasons for harming yourself  · Remove any means that you might use to hurt yourself (examples: pills, rope, extension cords, firearm)  · Get professional help from the community (Mental Health, Substance Abuse, psychological counseling)  · Do not be alone:Call your Safe Contact- someone whom you trust who will be there for you.  · Call your local CRISIS HOTLINE 592-0678 or 550-939-5388  · Call your local Children's Mobile Crisis Response Team [place] (989) 186-4635 or www.[employer]  · Call the toll free National Suicide Prevention Hotlines   · National Suicide Prevention Lifeline 834-614-WCNR (8624)  · National Hope Line Network 800-SUICIDE (296-9734)    I acknowledge receipt and understanding of these Home Care Instructions.

## 2020-03-06 NOTE — OR NURSING
1439: Patient arrived from [facility] with oral airway. Patient roused immediately and oral airway d/c'd by anesthesia. Patient on 2L NC. Resting comfortably. VSS  1445: Denies pain and nausea at this time. Wishes to sleep. Frequent non-productive, wet cough noted.   1450: Patient became nauseated with dry heaving. Will medicate per emar.   1505: Nausea resolving with medication. Patient resting comfortably. Denies pain. Will update family via telephone.  1520: Patient resting comfortably. VSS. No changes.   1535: No changes.  1540: Patient appropriate for stage II. +  1545: Patient leaving stage II with CNA. No changes. VSS.

## 2020-03-06 NOTE — ANESTHESIA QCDR
2019 [facility] Clinical Data Registry (for Quality Improvement)     Postoperative nausea/vomiting risk protocol (Adult = 18 yrs and Pediatric 3-17 yrs)- (430 and 463)  General inhalation anesthetic (NOT TIVA) with PONV risk factors: Yes  Provision of anti-emetic therapy with at least 2 different classes of agents: Yes   Patient DID NOT receive anti-emetic therapy and reason is documented in Medical Record:  N/A    Multimodal Pain Management- (477)  Non-emergent surgery AND patient age >= 18:   Use of Multimodal Pain Management, two or more drugs and/or interventions, NOT including systemic opioids:   Exception: Documented allergy to multiple classes of analgesics:     Smoking Abstinence (404)  Patient is current smoker (cigarette, pipe, e-cig, marijuanna):   Elective Surgery:   Abstinence instructions provided prior to day of surgery:   Patient abstained from smoking on day of surgery:     Pre-Op Beta-Blocker in Isolated CABG (44)  Isolated CABG AND patient age >= 18:   Beta-blocker admin within 24 hours of surgical incision:   Exception:of medical reason(s) for not administering beta blocker within 24 hours prior to surgical incision (e.g., not  indicated,other medical reason):     PACU assessment of acute postoperative pain prior to Anesthesia Care End- Applies to Patients Age = 18- (ABG7)  Initial PACU pain score is which of the following: < 7/10  Patient unable to report pain score: N/A    Post-anesthetic transfer of care checklist/protocol to PACU/ICU- (426 and 427)  Upon conclusion of case, patient transferred to which of the following locations: PACU/Non-ICU  Use of transfer checklist/protocol: Yes  Exclusion: Service Performed in Patient Hospital Room (and thus did not require transfer): N/A  Unplanned admission to ICU related to anesthesia service up through end of PACU care- (MD51)  Unplanned admission to ICU (not initially anticipated at anesthesia start time): Yes

## 2020-03-06 NOTE — ANESTHESIA PROCEDURE NOTES
Airway  Date/Time: 3/6/2020 1:43 PM  Performed by: Manpreet Ojeda M.D.  Authorized by: Manpreet Ojeda M.D.     Location:  OR  Urgency:  Elective  Indications for Airway Management:  Anesthesia  Spontaneous Ventilation: absent    Sedation Level:  Deep  Preoxygenated: Yes    Patient Position:  Sniffing  Final Airway Type:  Endotracheal airway  Final Endotracheal Airway:  ETT  Cuffed: Yes    Technique Used for Successful ETT Placement:  Direct laryngoscopy  Insertion Site:  Oral  Blade Type:  Glide  Laryngoscope Blade/Videolaryngoscope Blade Size:  4  ETT Size (mm):  8.0  Measured from:  Teeth  ETT to Teeth (cm):  22  Placement Verified by: auscultation and capnometry    Cormack-Lehane Classification:  Grade IV - neither glottis nor epiglottis seen  Number of Attempts at Approach:  1

## 2020-03-06 NOTE — ANESTHESIA POSTPROCEDURE EVALUATION
Patient: Norberto Shultzobesaskia    Procedure Summary     Date:  03/06/20 Room / Location:   ENDOSCOPIC ULTRASOUND ROOM / SURGERY [facility]    Anesthesia Start:  1330 Anesthesia Stop:  1442    Procedures:       GASTROSCOPY - W/ESOPHAGEAL STENTING AND BIOPSIES      EGD, WITH ENDOSCOPIC US - UPPER LINEAR RADIAL      EGD, WITH ASPIRATION BIOPSY - FNA Diagnosis:       Malignant tumor of esophagus (HCC)      (MALIGNANT TUMOR OF ESOPHAGUS)    Surgeon:  Gavino Cardenas M.D. Responsible Provider:  Manpreet Ojeda M.D.    Anesthesia Type:  general ASA Status:  3          Final Anesthesia Type: general  Last vitals  BP   Blood Pressure: 130/72    Temp   36.4 °C (97.5 °F)    Pulse   Pulse: (!) 52   Resp   18    SpO2   95 %      Anesthesia Post Evaluation    Patient location during evaluation: PACU  Patient participation: complete - patient participated  Level of consciousness: awake and alert  Pain score: 3    Airway patency: patent  Anesthetic complications: no  Cardiovascular status: hemodynamically stable  Respiratory status: acceptable  Hydration status: euvolemic    PONV: none           Nurse Pain Score: 0 (NPRS)

## 2020-03-06 NOTE — OR NURSING
1537: Report received from ION Alarcon.   1548: Pt arrived to phase II AOx4, no signs of resp distress. Pt has non-productive cough. Pt denies pain and states nausea has subsided after zofran in PACU. Abdomen soft, non-tender. Pt up to side of stretcher dressing with CNA present. Steadygait.   1555: Once pt in recliner, pt vomiting clear and green emesis. VSS. Pt states nausea slightly decreased when emesis stopped. Pt states he needs to use restroom. Ambulated to restroom and successfully voided. Upon return to recliner, anti-emetic administered to aid in nausea. No additional episodes of vomiting occurred.   160: Pt's daughter and grandaughter at bedside.   1605: Pt and daughter given d/c instructions regarding post anesthesia, post procedure, medications, diet, signs of infection, and signs of emergency. Pt and daughter verbalized understanding and asked relevant questions.   1620: Pt states nausea ceased. IV discontinued without complication. Per pt's daughter, Dr. Cardenas stated pt no longer needs PET scan but a CT scan instead. RN called PET scan to cancel appointment.   1630: Pt wheeled to daughter's car and steadily ambulated from wheelchair to passenger seat.

## 2020-03-07 NOTE — OP REPORT
Date of Procedure: 3/6/2020   Referring Physicians:   1. Dr.Ambika RENE     Procedure performed:  1.Radial Endoscopic ultrasound  2.EGD with Savoury dilation   3.EGD with Biopsy   ====================================  Anesthesiologist: Manpreet Ojeda M.D.  Surgeon: Gavino Cardenas M.D.    -----------------------------------------------------------------  Preoperative diagnosis:   1. Esophageal cancer   2. Dysphagia     Postoperative diagnosis:  1. Pseudoachalasia   2. Esophageal cancer   ------------------------------------------------------------------    Impression:    EGD:     A  Agustin circumferential infiltrative mucosal changes with superficial ulceration without any protruding mass lesion was seen at the GE junction .  The lesion is extending 0.5cm- 1 cm to the cardia the most   The GE junction appears closed on exam with gastric folds radiating upward has a pop-out feeling on gentle pressure.  There is not enough narrowing/ stricture to hold an esophageal stent   The esophagus appear dilated proximal to the GE junction   These findings were suggestive of  pseudoachalasia   -S/p biopsy of the lesion with a cold forceps  -S/p dilation of the GE junction using a wire guided 45F savoury dilator   -A contained tear was seen after the dilation     EUS:   A hypoechoic lesion was found at GE junction, best visible on EUS from cardia   The lesion measures 11.7 mm x 28.9 mm in size   The lesion invades to the adventitia   T stage : T3   No invasion to adjacent organs   No periesophageal or perigastric lymphadenopathy    Nstage: N0     Clinical diagnosis and Staging:  Adenocarcinoma of the GE junction   T3N0Mx   Stage : III ( pending CT abdomen & pelvis)     Recommendations:  1. I will follow up pathology.   2. Follow with Oncology   3. Call us if dysphagia worsens/ May need rpt dilation   4. Present in the TB   5.Oncology navigator consult       ====================================  Indication:   64 yrs old M with newly  diagnosed esophageal malignancy and dysphagia   - A CT chest didn't reveal any mass lesions or lymphadenopathy   -A CT abd/pelvis has been ordered   -The insurance denied a PET scan as the primary imaging   -------------------------    Medications:  The patient was performed under general anesthesia with monitored anesthesia care.  Propofol sedation was administered under discretion of anesthesiology.  Please refer to their notes for more details.     Universal Precautions:  [place] Precautions were followed throughout the entire procedure.  The patient was continuously monitored on Blood pressure, pulse oximetry, and heart rate monitoring though out the procedure and afterwards.        A time out was taken prior to the start of the procedure to identify the patient's name, date of birth, ID badge, and appropriate procedure site/procedure type.    Informed Consent:  Informed consent was obtained from the patient after the risks, benefits, and indications were explained to the patient in detail.  The alternatives to the procedure were explained as well.  Risks such as bleeding, infection, perforation, complications with sedation, and need for open surgery for complications were explained in detail prior to obtaining consent.  The patient was given opportunity to ask questions.  If need be these same risks, benefits, indication, and complications were explained to the patients family member prior to obtaining consent for the procedure.      --------------------  Description of procedure:    Patient was brought to endoscopy operating room and met by staff and physicians.  After procedure was confirmed and appropriate procedure was identified preparations were made for sedation.  A time out was taken to identify the patients name, procedure site/type, and ID badge.  The patient was then sedated, and an oral bite block was placed between the incisors.        ======EGD============  When the patient was appropriately  sedated the GIF Olympus forward-viewing upper endoscope was used to perform the procedure.  The endoscope was passed under direct visualization to the level of the 2nd portion of the duodenum.    Hypopharynx:  Limited views of the hypopharynx, vocal cords and piriform sinuses revealed no abnormalities.  Esophagus:  The lining of the upper esophagus was normal in appearance.  There was no difficulty passing the endoscope.  The lining of the mid and distal esophagus was normal in appearance.    The Squamo-columnar junction was measured at 46 cm.  The gastric folds were measured at 46 cm  The diaphragmatic hiatus was measured at 46 cm  A  Agustin circumferential infiltrative mucosal changes with superficial ulceration without any protruding mass lesion was seen at the GE junction .  The lesion is extending 0.5cm- 1 cm to the cardia the most   The GE junction appears closed on exam with gastric folds radiating upward has a pop-out feeling on gentle pressure.  The esophagus appear dilated proximal to the GE junction   These findings were suggestive of  pseudoachalasia   -S/p biopsy of the lesion with a cold forceps  -S/p dilation of the GE junction using a wire guided 45F savoury dilator   -A contained tear was seen after the dilation         Stomach:  Careful examination was made of the body and antrum of the stomach.  Detailed retroflexed views and photographs were obtained of the Fundus and cardia as well.  BODY: No abnormalities identified  ANTRUM: no abnormalities identified  Fundus: no abnormalities identified  Cardia: no abnormalities identified    Duodenum:  The endoscope was passed through the pylorus into the duodenal bulb which was examined in detail. Further advancement allowed for examination of the 1st and 2nd portion of the duodenum.  DUODENAL BULB: Normal in appearance  1st & 2nd PORTION OF DUODENUM:  Normal in appearance    The endoscope  Was moved back to the stomach.  The insufflated air was  removed.    ==========Radial Endoscopic Ultrasound=========    The Olympus GF- Radial Array Echoendoscope was attached to the central processor and calibrated.  The Aloka 10 Ultrasound processor was set for endosonography.  The Echoendoscope was passed per to the duodenum and withdrawn to the GE junction.  The ultrasound tip was guided to the Aorta and the celiac axis was visualized.  Our exam proceeded in standard fashion with examination of the celiac trunk, hepatobiliary structures and pancreas.    There scope was then placed in the GE junction and slowly withdrawn to examine the mediastinum and esophagus.    The celiac axis: Normal     Esophagus:    A hypoechoic lesion was found at GE junction, best visible on EUS from cardia   The lesion measures 11.7 mm x 28.9 mm in size   The lesion invades to the adventitia   T stage : T3   No invasion to adjacent organs   No periesophageal or perigastric lymphadenopathy    N stage: N0      Liver: No lesions seen on the limited exam of the liver

## 2020-03-08 ENCOUNTER — APPOINTMENT (OUTPATIENT)
Dept: RADIOLOGY | Facility: MEDICAL CENTER | Age: 65
End: 2020-03-08
Attending: INTERNAL MEDICINE
Payer: COMMERCIAL

## 2020-03-16 ENCOUNTER — HOSPITAL ENCOUNTER (OUTPATIENT)
Dept: RADIATION ONCOLOGY | Facility: MEDICAL CENTER | Age: 65
End: 2020-03-31
Attending: RADIOLOGY
Payer: COMMERCIAL

## 2020-03-16 VITALS
DIASTOLIC BLOOD PRESSURE: 67 MMHG | TEMPERATURE: 97.5 F | HEART RATE: 55 BPM | HEIGHT: 74 IN | WEIGHT: 239.8 LBS | OXYGEN SATURATION: 95 % | SYSTOLIC BLOOD PRESSURE: 122 MMHG | BODY MASS INDEX: 30.77 KG/M2

## 2020-03-16 DIAGNOSIS — C15.5 MALIGNANT NEOPLASM OF LOWER THIRD OF ESOPHAGUS (HCC): ICD-10-CM

## 2020-03-16 PROCEDURE — 99214 OFFICE O/P EST MOD 30 MIN: CPT | Performed by: RADIOLOGY

## 2020-03-16 PROCEDURE — 99205 OFFICE O/P NEW HI 60 MIN: CPT | Performed by: RADIOLOGY

## 2020-03-16 ASSESSMENT — PAIN SCALES - GENERAL: PAINLEVEL: NO PAIN

## 2020-03-16 NOTE — CONSULTS
RADIATION ONCOLOGY CONSULT    DATE OF SERVICE: 3/16/2020    IDENTIFICATION: A 64 y.o. male with   Visit Diagnoses     ICD-10-CM   1. Malignant neoplasm of lower third of esophagus (HCC) C15.5     Malignant neoplasm of lower third of esophagus (HCC)  Staging form: Esophagus - Adenocarcinoma, AJCC 8th Edition  - Clinical: Stage III (cT3, cN0, cM0, G3) - Signed by Abelardo Arias M.D. on 3/16/2020  Total positive nodes: 0  Histologic grading system: 3 grade system      He is here at the kind request of Dr. Bates      HISTORY OF PRESENT ILLNESS:   64-year-old male who originally presented with dysphasia around Jessie time underwent first endoscopy by Dr. Hernandez on February 7, 2020 with pathology showing adenocarcinoma.  Patient had staging CT chest abdomen pelvis February 27, 2020 showed no evidence of metastatic disease but did show mild prominence to low wall of esophageal junction.  Patient had endoscopic ultrasound with Dr. Cardenas on March 6, 2020 which showed clinical T3 N0 M0 adenocarcinoma in the distal esophageal junction.  Patient was seen by Dr. Bates who discussed chemoradiation therapy and ordered PET CT scan but due to insurance reasons has not been able to get it.  He currently has dysphasia to solids greater than liquids but has no major weight loss or bone pain.    PROBLEM LIST:  Patient Active Problem List   Diagnosis   • Coronary artery disease due to calcified coronary lesion   • Essential hypertension, benign   • Dyslipidemia   • Cervical disc disease   • STEMI (ST elevation myocardial infarction) (HCC)   • COPD (chronic obstructive pulmonary disease) (HCC)   • Hypothyroid   • Thrombocytopenia (HCC)   • Systolic heart failure secondary to coronary artery disease (HCC)   • Diastolic heart failure (HCC)   • Cardiomyopathy, ischemic   • Hypoxia   • Hypoxemia   • Central sleep apnea   • Coronary artery disease involving native coronary artery of native heart without angina pectoris   • Central  apnea   • BMI 37.0-37.9, adult   • Malignant neoplasm of lower third of esophagus (HCC)        PAST SURGICAL HISTORY:  Past Surgical History:   Procedure Laterality Date   • PB ENDOSCOPIC US EXAM, ESOPH  3/6/2020    Procedure: EGD, WITH ENDOSCOPIC US - UPPER LINEAR RADIAL;  Surgeon: Gavino Cardenas M.D.;  Location: [facility];  Service: Gastroenterology   • GASTROSCOPY-ENDO  3/6/2020    Procedure: GASTROSCOPY - W/ESOPHAGEAL STENTING AND BIOPSIES;  Surgeon: Gavino Cardenas M.D.;  Location: [facility];  Service: Gastroenterology   • EGD WITH ASP/BX  3/6/2020    Procedure: EGD, WITH ASPIRATION BIOPSY - FNA;  Surgeon: Gavino Cardenas M.D.;  Location: [facility];  Service: Gastroenterology   • MULTIPLE CORONARY ARTERY BYPASS ENDO VEIN HARVEST N/A 7/23/2015    Procedure: MULTIPLE CORONARY ARTERY BYPASS ENDO VEIN HARVEST;  Surgeon: Deuce Tam M.D.;  Location: [facility];  Service:    • CERVICAL DISK AND FUSION ANTERIOR  10/13/2014    Performed by Moises Kerns M.D. at [facility]   • OTHER ORTHOPEDIC SURGERY  2012 1975-present 5 surgeries   • COLONOSCOPY  2009   • OTHER CARDIAC SURGERY  2008    cardiac stents   • DENTAL SURGERY  1971    wisdom teeth   • TONSILLECTOMY  1958   • HERNIA REPAIR  1956    'born with hernia'       CURRENT MEDICATIONS:  Current Outpatient Medications   Medication Sig Dispense Refill   • BRILINTA 90 MG Tab tablet      • metoprolol SR (TOPROL XL) 25 MG TABLET SR 24 HR      • temazepam (RESTORIL) 15 MG Cap Take 1 Cap by mouth at bedtime as needed for Sleep for up to 90 days. 30 Cap 2   • furosemide (LASIX) 20 MG Tab TAKE 1 TABLET DAILY 90 Tab 3   • atorvastatin (LIPITOR) 80 MG tablet TAKE 1 TABLET DAILY 90 Tab 3   • doxazosin (CARDURA) 4 MG Tab TAKE 1 TABLET DAILY 90 Tab 3   • ezetimibe (ZETIA) 10 MG Tab TAKE 1 TABLET DAILY 90 Tab 3   • potassium chloride SA (K-DUR) 10 MEQ Tab CR Take 1 Tab by mouth every day. 90 Tab 3    • benazepril (LOTENSIN) 20 MG Tab Take 1 Tab by mouth every day. 90 Tab 3   • ADVAIR DISKUS 500-50 MCG/DOSE AEROSOL POWDER, BREATH ACTIVATED USE 1 PUFF EVERY 12 HOURS 3 Inhaler 3   • SPIRIVA HANDIHALER 18 MCG Cap INHALE THE CONTENTS OF 1  CAPSULE VIA HANDIHALER  DAILY 90 Cap 3   • Albuterol Sulfate (PROAIR RESPICLICK) 108 (90 Base) MCG/ACT AEROSOL POWDER, BREATH ACTIVATED Inhale 2 Puffs by mouth as needed (for shortness of breath, wheezing.). every 4-6 hours as needed. 1 Each 3   • nitroglycerin (NITROSTAT) 0.4 MG SL Tab Place 1 Tab under tongue as needed for Chest Pain (Place 1 tablet under the tongue every 5 minutes up to 3 doses as needed for chest pain). 75 Tab 3   • omeprazole (PRILOSEC) 40 MG delayed-release capsule 40 mg.     • levothyroxine (SYNTHROID) 75 MCG Tab Take 75 mcg by mouth daily.     • PREVIDENT 5000 SENSITIVE 1.1-5 % Paste   0   • Melatonin 10 MG Tab Take  by mouth every bedtime.     • Cranberry 500 MG Cap Take  by mouth every day.     • Zinc 50 MG Cap Take 50 mg by mouth every day.     • diphenhydrAMINE (BENADRYL) 25 MG Tab Take 25 mg by mouth every 6 hours as needed for Sleep.     • aspirin EC (ECOTRIN) 81 MG TBEC Take 81 mg by mouth every day.     • tamsulosin (FLOMAX) 0.4 MG capsule Take 0.4 mg by mouth ONE-HALF HOUR AFTER BREAKFAST.       No current facility-administered medications for this encounter.        ALLERGIES:    Morphine    FAMILY HISTORY:    family history includes Cancer in his mother; Heart Attack in his father.    SOCIAL HISTORY:     reports that he quit smoking about 20 years ago. His smoking use included cigarettes and cigars. He has a 40.00 pack-year smoking history. He has never used smokeless tobacco. He reports current drug use. Drug: Inhaled. He reports that he does not drink alcohol.    REVIEW OF SYSTEMS:  A review of systems for today's date of service was reviewed and uploaded into the electronic medical record.      PHYSICAL EXAM:    ECOG PERFORMANCE STATUS:  ECOG  "Performance Review 3/16/2020   ECOG Performance Status Fully active, able to carry on all pre-disease performance without restriction   Some recent data might be hidden     Karnofsky Score 3/16/2020   Karnofsky Score 100   Some recent data might be hidden     /67 (BP Location: Right arm, Patient Position: Sitting, BP Cuff Size: Adult long)   Pulse (!) 55   Temp 36.4 °C (97.5 °F) (Temporal)   Ht 1.88 m (6' 2\")   Wt 108.8 kg (239 lb 12.8 oz)   SpO2 95%   BMI 30.79 kg/m²   Physical Exam  Vitals signs reviewed.   Constitutional:       Appearance: Normal appearance.   HENT:      Head: Normocephalic and atraumatic.      Nose: Nose normal.      Mouth/Throat:      Mouth: Mucous membranes are moist.   Eyes:      Pupils: Pupils are equal, round, and reactive to light.   Neck:      Musculoskeletal: Normal range of motion.   Cardiovascular:      Rate and Rhythm: Normal rate.   Pulmonary:      Effort: Pulmonary effort is normal.   Abdominal:      General: Abdomen is flat.   Musculoskeletal: Normal range of motion.   Neurological:      General: No focal deficit present.      Mental Status: He is alert.   Psychiatric:         Mood and Affect: Mood normal.          LABORATORY DATA:   Lab Results   Component Value Date/Time    WBC 7.2 03/06/2020 1210    WBC 10.1 07/30/2015 0340    WBC 9.5 07/29/2015 0515    HEMOGLOBIN 14.7 03/06/2020 1210    HEMOGLOBIN 11.6 (L) 07/30/2015 0340    HEMOGLOBIN 11.4 (L) 07/29/2015 0515    HEMATOCRIT 43.4 03/06/2020 1210    HEMATOCRIT 34.1 (L) 07/30/2015 0340    HEMATOCRIT 33.9 (L) 07/29/2015 0515    MCV 90.2 03/06/2020 1210    MCV 90.9 07/30/2015 0340    MCV 89.9 07/29/2015 0515    PLATELETCT 166 03/06/2020 1210    PLATELETCT 277 07/30/2015 0340    PLATELETCT 228 07/29/2015 0515    NEUTS 4.59 03/06/2020 1210    NEUTS 5.21 07/23/2015 0940    NEUTS 3.9 12/23/2013 0035      Lab Results   Component Value Date/Time    SODIUM 138 03/06/2020 1210    SODIUM 134 (L) 07/30/2015 0340    SODIUM 134 (L) " 07/29/2015 0515    POTASSIUM 4.2 03/06/2020 1210    POTASSIUM 4.3 07/30/2015 0340    POTASSIUM 4.1 07/29/2015 0515    BUN 18 03/06/2020 1210    BUN 17 07/30/2015 0340    BUN 15 07/29/2015 0515    CREATININE 0.97 03/06/2020 1210    CREATININE 0.94 07/30/2015 0340    CREATININE 0.89 07/29/2015 0515    CALCIUM 8.8 03/06/2020 1210    CALCIUM 8.6 07/30/2015 0340    CALCIUM 8.8 07/29/2015 0515    ALBUMIN 3.7 07/23/2015 0940    ALBUMIN 4.4 12/23/2013 0035    ASTSGOT 130 (H) 07/23/2015 0940    ASTSGOT 20 12/23/2013 0035    ALKPHOSPHAT 53 07/23/2015 0940    ALKPHOSPHAT 40 12/23/2013 0035    IFNOTAFR >60 03/06/2020 1210    IFNOTAFR >60 07/30/2015 0340    IFNOTAFR >60 07/29/2015 0515       IMPRESSION:    A 64 y.o. with  Visit Diagnoses     ICD-10-CM   1. Malignant neoplasm of lower third of esophagus (HCC) C15.5     Malignant neoplasm of lower third of esophagus (HCC)  Staging form: Esophagus - Adenocarcinoma, AJCC 8th Edition  - Clinical: Stage III (cT3, cN0, cM0, G3) - Signed by Abelardo Arias M.D. on 3/16/2020  Total positive nodes: 0  Histologic grading system: 3 grade system        RECOMMENDATIONS:   We will plan to treat the patient with preoperative chemo-radiotherapy.  The patient has a Stage T3N0M0 esophagus cancer.  We talked about the general prognosis and treatment rationale.  We discussed our goal to control the tumor in the thorax, prevent regional recurrence, and hopeful help to prevent or delay metastatic progression.   We discussed the goal of facilitating complete surgical resection.    We discussed that for adenocarcinoma pathologic complete response rate is 29%.    We discussed the treatment planning process and treatments.  We discussed the risks of treatment at length including lung injury, shortness of breath, fibrosis, pneumonitis, acute and chronic esophageal injury, heart injury, spinal cord injury which is rare, skin toxicity, chest wall injury, pain, dehydration, supplemental oxygen use or  dependence, and the risk of treatment related death.  They would like to proceed forward with treatment.  We will set up 4DCT simulation for treatment planning imaging in the next few days.  That will consist of supine immobilization, possibly IV contrast depending on kidney function and targeting requirements, appropriate skin surface marking for radiation treatment. Dose will be 50.4Gy in 28 treatments over 5.5 weeks. We do not have PET CT due to insurance issues.     We will then complete the behind the scenes planning process over several days using appropriate image fusion, target delineation, dosimetry,  checks, and treatment scheduling. They were given my contact information and encouraged to call with questions or concerns.  We will coordinate with Dr. Bates medical oncology regarding the combined starting of chemotherapy with the radiotherapy.     Thank you for the opportunity to participate in his care.  If any questions or comments, please do not hesitate in calling.    CC: Dr. Bates

## 2020-03-16 NOTE — NON-PROVIDER
"Patient was seen today in clinic with Dr. Arias for consult.  Vitals signs and weight were obtained and pain assessment was completed.  Allergies and medications were reviewed with the patient.  Review of systems completed.     Vitals/Pain:  Vitals:    03/16/20 1318   BP: 122/67   BP Location: Right arm   Patient Position: Sitting   BP Cuff Size: Adult long   Pulse: (!) 55   Temp: 36.4 °C (97.5 °F)   TempSrc: Temporal   SpO2: 95%   Weight: 108.8 kg (239 lb 12.8 oz)   Height: 1.88 m (6' 2\")   Pain Score: No pain        Allergies:   Morphine    Current Medications:  Current Outpatient Medications   Medication Sig Dispense Refill   • BRILINTA 90 MG Tab tablet      • metoprolol SR (TOPROL XL) 25 MG TABLET SR 24 HR      • temazepam (RESTORIL) 15 MG Cap Take 1 Cap by mouth at bedtime as needed for Sleep for up to 90 days. 30 Cap 2   • furosemide (LASIX) 20 MG Tab TAKE 1 TABLET DAILY 90 Tab 3   • atorvastatin (LIPITOR) 80 MG tablet TAKE 1 TABLET DAILY 90 Tab 3   • doxazosin (CARDURA) 4 MG Tab TAKE 1 TABLET DAILY 90 Tab 3   • ezetimibe (ZETIA) 10 MG Tab TAKE 1 TABLET DAILY 90 Tab 3   • potassium chloride SA (K-DUR) 10 MEQ Tab CR Take 1 Tab by mouth every day. 90 Tab 3   • benazepril (LOTENSIN) 20 MG Tab Take 1 Tab by mouth every day. 90 Tab 3   • ADVAIR DISKUS 500-50 MCG/DOSE AEROSOL POWDER, BREATH ACTIVATED USE 1 PUFF EVERY 12 HOURS 3 Inhaler 3   • SPIRIVA HANDIHALER 18 MCG Cap INHALE THE CONTENTS OF 1  CAPSULE VIA HANDIHALER  DAILY 90 Cap 3   • Albuterol Sulfate (PROAIR RESPICLICK) 108 (90 Base) MCG/ACT AEROSOL POWDER, BREATH ACTIVATED Inhale 2 Puffs by mouth as needed (for shortness of breath, wheezing.). every 4-6 hours as needed. 1 Each 3   • nitroglycerin (NITROSTAT) 0.4 MG SL Tab Place 1 Tab under tongue as needed for Chest Pain (Place 1 tablet under the tongue every 5 minutes up to 3 doses as needed for chest pain). 75 Tab 3   • omeprazole (PRILOSEC) 40 MG delayed-release capsule 40 mg.     • levothyroxine " (SYNTHROID) 75 MCG Tab Take 75 mcg by mouth daily.     • PREVIDENT 5000 SENSITIVE 1.1-5 % Paste   0   • Melatonin 10 MG Tab Take  by mouth every bedtime.     • Cranberry 500 MG Cap Take  by mouth every day.     • Zinc 50 MG Cap Take 50 mg by mouth every day.     • diphenhydrAMINE (BENADRYL) 25 MG Tab Take 25 mg by mouth every 6 hours as needed for Sleep.     • aspirin EC (ECOTRIN) 81 MG TBEC Take 81 mg by mouth every day.     • tamsulosin (FLOMAX) 0.4 MG capsule Take 0.4 mg by mouth ONE-HALF HOUR AFTER BREAKFAST.       No current facility-administered medications for this encounter.          PCP:  Jossy Mcclellan, Med Ass't

## 2020-03-20 ENCOUNTER — HOSPITAL ENCOUNTER (OUTPATIENT)
Dept: RADIATION ONCOLOGY | Facility: MEDICAL CENTER | Age: 65
End: 2020-03-20
Payer: COMMERCIAL

## 2020-03-20 PROCEDURE — 77263 THER RADIOLOGY TX PLNG CPLX: CPT | Performed by: RADIOLOGY

## 2020-03-20 PROCEDURE — 77470 SPECIAL RADIATION TREATMENT: CPT | Mod: 26 | Performed by: RADIOLOGY

## 2020-03-20 PROCEDURE — 77470 SPECIAL RADIATION TREATMENT: CPT | Performed by: RADIOLOGY

## 2020-03-20 PROCEDURE — 77334 RADIATION TREATMENT AID(S): CPT | Mod: 26 | Performed by: RADIOLOGY

## 2020-03-20 PROCEDURE — 77290 THER RAD SIMULAJ FIELD CPLX: CPT | Performed by: RADIOLOGY

## 2020-03-20 PROCEDURE — 77334 RADIATION TREATMENT AID(S): CPT | Performed by: RADIOLOGY

## 2020-03-23 ENCOUNTER — NON-PROVIDER VISIT (OUTPATIENT)
Dept: HEALTH INFORMATION MANAGEMENT | Facility: MEDICAL CENTER | Age: 65
End: 2020-03-23
Payer: COMMERCIAL

## 2020-03-23 ENCOUNTER — HOSPITAL ENCOUNTER (OUTPATIENT)
Dept: RADIOLOGY | Facility: MEDICAL CENTER | Age: 65
End: 2020-03-23
Attending: INTERNAL MEDICINE
Payer: COMMERCIAL

## 2020-03-23 VITALS — BODY MASS INDEX: 30.45 KG/M2 | WEIGHT: 237.2 LBS

## 2020-03-23 DIAGNOSIS — C15.5 MALIGNANT NEOPLASM OF LOWER THIRD OF ESOPHAGUS (HCC): ICD-10-CM

## 2020-03-23 DIAGNOSIS — R63.4 UNINTENDED WEIGHT LOSS: ICD-10-CM

## 2020-03-23 DIAGNOSIS — C15.5 MALIGNANT NEOPLASM OF ABDOMINAL ESOPHAGUS (HCC): ICD-10-CM

## 2020-03-23 PROCEDURE — 97802 MEDICAL NUTRITION INDIV IN: CPT | Performed by: DIETITIAN, REGISTERED

## 2020-03-23 PROCEDURE — A9552 F18 FDG: HCPCS

## 2020-03-23 NOTE — PROGRESS NOTES
3/23/2020    Alhaji Fenton M.D.  64 y.o.   Time in/out: 11:10-11:50am    Anthropometrics/Objective  Vitals:    03/23/20 1153   Weight: 107.6 kg (237 lb 3.2 oz)       Body mass index is 30.45 kg/m².      Estimated Caloric needs: ~3200 Kcals/day (30g/kg body weight based on hypermetabolism 2/2 cancer and chemoradiation)   See comprehensive patient history form for further information     Subjective:  · Chemoradiation for esophageal cancer to begin on 3/30  · Having trouble swallowing, keeping food down, and has a lot of regurgitation, causing decreased appetite and early satiety. Not currently experiencing N/D/C   · Weight loss d/t decreased appetite and hypermetabolism 2/2 esophageal cancer  · Daughter does the grocery shopping and meal preparation, has been making more soups and smoothies lately    Nutrition Diagnosis (PES Statement)    · Inadequate oral intake r/t difficulty swallowing 2/2 esophageal cancer as evidenced by pt weigh history, pt report, and diet recall    Client history:  Condition(s) associated with a diagnosis or treatment (specify): esophageal cancer, COPD, obesity, DLD, hypothyroid, CAD    Biochemical data, medical test and procedures  Lab Results   Component Value Date/Time    HBA1C 6.1 (H) 07/23/2015 09:40 AM   @  Lab Results   Component Value Date/Time    POCGLUCOSE 118 (H) 07/28/2015 05:06 PM     No results found for: CHOLSTRLTOT, LDL, HDL, TRIGLYCERIDE      Nutrition Intervention    Meal and Snack  Recommend a general/healthful diet    Comprehensive Nutrition education Instruction or training leading to in-depth nutrition related knowledge about:  Metabolism of carb, protein, fat, Increasing/Decreasing PO intake, Handouts provided regarding topics discussed and Theraputic diet for cancer    Monitoring & Evaluation Plan    Behavioral-Environmental:  Behavior: practice having small frequent meals (5-6 meals/day)    Food / Nutrient Intake:  Food intake: add calorie-dense foods and oral nutrition  "supplements (ONS) as meal replacement when appetite is low     Physical Signs / Symptoms:  Weight: maintain weight at ~237 lbs through chemoradiation    Assessment Notes: Norberto is here because he's having trouble swallowing and keeping food down r/t esophageal cancer. He is concerned that he is not meeting his nutrient needs and he is consistently losing weight despite trying to eat more. His difficulty swallowing is inconsistent and unpredictable; sometimes he's able to tolerate certain foods, other times he cannot tolerate the same foods. He says he has been eating more when is appetite is better and his dysphagia is less severe, which I encouraged him to keep doing. I provided him with the booklet \"Eating Hints Before, During and After Cancer\", which provides information and suggestions for combating common cancer symptoms such as nausea, lack of appetite, difficulty swallowing, etc. I encouraged him to utilize this throughout radiation since it will likely affect his intake in different ways. I suggested he begin eating small meals more frequently to increase intake d/t early satiety and lack of appetite. I recommended that he have ONS on hand for times that he is fatigued or not hungry in order to meet his nutritional needs. I recommended that he add sauces/gravies/condiments to foods, particularly meats, to add calories and to moisten the foods. In addition, I provided tips and suggestions for adding more calorie-dense foods from fats such as avocados, whole milk dairy products, and nuts/nut butters. I cautioned against dry foods, foods with mixed consistencies (such as chunky soups), and hard/crunchy foods. Pt would like to schedule a f/up visit PRN as he advances through chemoradiation and if his symptoms worsen and he continues to lose weight. Pt verbalized understanding and all of his/her nutrition-related questions were answered       Follow-up: PRN  "

## 2020-03-24 ENCOUNTER — HOSPITAL ENCOUNTER (OUTPATIENT)
Dept: RADIOLOGY | Facility: MEDICAL CENTER | Age: 65
End: 2020-03-24
Payer: COMMERCIAL

## 2020-03-26 ENCOUNTER — PATIENT OUTREACH (OUTPATIENT)
Dept: OTHER | Facility: MEDICAL CENTER | Age: 65
End: 2020-03-26

## 2020-03-26 PROCEDURE — 77300 RADIATION THERAPY DOSE PLAN: CPT | Mod: 26 | Performed by: RADIOLOGY

## 2020-03-26 PROCEDURE — 77293 RESPIRATOR MOTION MGMT SIMUL: CPT | Mod: 26 | Performed by: RADIOLOGY

## 2020-03-26 PROCEDURE — 77338 DESIGN MLC DEVICE FOR IMRT: CPT | Mod: 26 | Performed by: RADIOLOGY

## 2020-03-26 PROCEDURE — 77301 RADIOTHERAPY DOSE PLAN IMRT: CPT | Performed by: RADIOLOGY

## 2020-03-26 PROCEDURE — 77338 DESIGN MLC DEVICE FOR IMRT: CPT | Performed by: RADIOLOGY

## 2020-03-26 PROCEDURE — 77301 RADIOTHERAPY DOSE PLAN IMRT: CPT | Mod: 26 | Performed by: RADIOLOGY

## 2020-03-26 PROCEDURE — 77293 RESPIRATOR MOTION MGMT SIMUL: CPT | Performed by: RADIOLOGY

## 2020-03-26 PROCEDURE — 77300 RADIATION THERAPY DOSE PLAN: CPT | Performed by: RADIOLOGY

## 2020-03-26 NOTE — PROGRESS NOTES
On 3/26/2020 at 0949 this ONN called patient. Introduced self and explained role of nurse navigator. Patient states he is doing well. Patient states he is scheduled to start chemotherapy at [facility] on Wednesday April 1, 2020 at [facility]. Patient states his chemotherapy appointments after that will be on Tuesdays. Patient states he is going to be staying locally in [place] at a trailer park while in treatment. Patient states he plans on going home on the weekends. Patient states his wife has dementia. Patient states his wife will be staying home and that patient's son in law will be helping with patient's wife. Patient states his daughter will be coming into [place] with him. Patient states he currently can afford lodging and transportation. Patient states if that changes he will contact this ONN. Patient asked about getting masks, this ONN explained that this ONN is unsure of where patient can get masks at this time but that when patient presents for treatment to ask. This ONN explained that without a mask patient should practice social distancing and good hygiene, especially hand washing. Patient made verbal understanding. Patient asking about being able to drive self. This ONN explained that patient should be able to drive self to and from appointments. This ONN explained that radiation side effects typically start to present around week three, especially fatigue. Patient denied other questions or concerns at this time. This ONN agreed to send a follow-up email with this ONN's contact info. Patient thanked ONN for call.     This ONN then sent following email:     Good morning Norberto,     This email is in follow-up to our phone call this morning. In the signature you will find my contact information. Please feel to reach out with any questions or concerns as they arise. Please let me know if there is anything you need assistance with during this time.     If you call me, please leave me a  message. I am temporarily working remotely but have access to my voicemails and check them regularly.     Take care,  KEY DodsonN, RN, OCN   Oncology Nurse Navigator   [address] [facility] [address] [place] [address]  P: [phone]   [email or web address]

## 2020-03-27 DIAGNOSIS — J44.9 CHRONIC OBSTRUCTIVE PULMONARY DISEASE, UNSPECIFIED COPD TYPE (HCC): Chronic | ICD-10-CM

## 2020-03-27 RX ORDER — TIOTROPIUM BROMIDE 18 UG/1
CAPSULE ORAL; RESPIRATORY (INHALATION)
Qty: 90 CAP | Refills: 3 | Status: ON HOLD | OUTPATIENT
Start: 2020-03-27 | End: 2020-04-14

## 2020-03-27 NOTE — TELEPHONE ENCOUNTER
Have we ever prescribed this med? Yes.  If yes, what date? 3/4/19    Last OV: 9/5/19 CAROLYNN Martin P.A-C    Next OV: 9/8/2020 CAROLYNN Martin P.A-C    DX: Chronic obstructive pulmonary disease, unspecified COPD type (HCC) (J44.9)    Medications: SPIRIVA HANDIHALER 18 MCG Cap

## 2020-03-27 NOTE — TELEPHONE ENCOUNTER
Caller Name: Norberto White                 Call Back Number: [phone] (home)         Patient approves a detailed voicemail message: N\A    Have we ever prescribed this med? Yes.  If yes, what date? 3/4/19 Advair     Last OV: 9/5/19 CAROLYNN Martin PA-C     Next OV: 9/8/2020 CAROLYNN Martin PA-C     DX: COPD     Medications:  Requested Prescriptions     Pending Prescriptions Disp Refills   • fluticasone-salmeterol (ADVAIR DISKUS) 500-50 MCG/DOSE AEROSOL POWDER, BREATH ACTIVATED [Pharmacy Med Name: WIXELA  500MCG 50MCG  INH  INHUB] 1 Inhaler      Sig: INHALE 1 PUFF BY MOUTH  EVERY 12 HOURS

## 2020-03-30 ENCOUNTER — HOSPITAL ENCOUNTER (OUTPATIENT)
Dept: RADIATION ONCOLOGY | Facility: MEDICAL CENTER | Age: 65
End: 2020-03-30
Payer: COMMERCIAL

## 2020-03-30 LAB
CHEMOTHERAPY INFUSION START DATE: NORMAL
CHEMOTHERAPY RECORDS: 1.8
CHEMOTHERAPY RECORDS: 4500
CHEMOTHERAPY RECORDS: NORMAL
CHEMOTHERAPY RX CANCER: NORMAL
DATE 1ST CHEMO CANCER: NORMAL
RAD ONC ARIA COURSE LAST TREATMENT DATE: NORMAL
RAD ONC ARIA COURSE TREATMENT ELAPSED DAYS: NORMAL
RAD ONC ARIA REFERENCE POINT DOSAGE GIVEN TO DATE: 1.8
RAD ONC ARIA REFERENCE POINT DOSAGE GIVEN TO DATE: 1.84
RAD ONC ARIA REFERENCE POINT ID: NORMAL
RAD ONC ARIA REFERENCE POINT ID: NORMAL
RAD ONC ARIA REFERENCE POINT SESSION DOSAGE GIVEN: 1.8
RAD ONC ARIA REFERENCE POINT SESSION DOSAGE GIVEN: 1.84

## 2020-03-30 PROCEDURE — 77386 HCHG IMRT DELIVERY COMPLEX: CPT | Performed by: RADIOLOGY

## 2020-03-30 PROCEDURE — 77280 THER RAD SIMULAJ FIELD SMPL: CPT | Performed by: RADIOLOGY

## 2020-03-31 ENCOUNTER — HOSPITAL ENCOUNTER (OUTPATIENT)
Dept: RADIATION ONCOLOGY | Facility: MEDICAL CENTER | Age: 65
End: 2020-03-31
Payer: COMMERCIAL

## 2020-03-31 DIAGNOSIS — G47.00 INSOMNIA, UNSPECIFIED TYPE: ICD-10-CM

## 2020-03-31 LAB
CHEMOTHERAPY INFUSION START DATE: NORMAL
CHEMOTHERAPY RECORDS: 1.8
CHEMOTHERAPY RECORDS: 4500
CHEMOTHERAPY RECORDS: NORMAL
CHEMOTHERAPY RX CANCER: NORMAL
DATE 1ST CHEMO CANCER: NORMAL
RAD ONC ARIA COURSE LAST TREATMENT DATE: NORMAL
RAD ONC ARIA COURSE TREATMENT ELAPSED DAYS: NORMAL
RAD ONC ARIA REFERENCE POINT DOSAGE GIVEN TO DATE: 3.6
RAD ONC ARIA REFERENCE POINT DOSAGE GIVEN TO DATE: 3.69
RAD ONC ARIA REFERENCE POINT ID: NORMAL
RAD ONC ARIA REFERENCE POINT ID: NORMAL
RAD ONC ARIA REFERENCE POINT SESSION DOSAGE GIVEN: 1.8
RAD ONC ARIA REFERENCE POINT SESSION DOSAGE GIVEN: 1.84

## 2020-03-31 PROCEDURE — 77386 HCHG IMRT DELIVERY COMPLEX: CPT | Performed by: RADIOLOGY

## 2020-03-31 PROCEDURE — 77014 PR CT GUIDANCE PLACEMENT RAD THERAPY FIELDS: CPT | Mod: 26 | Performed by: RADIOLOGY

## 2020-03-31 RX ORDER — TEMAZEPAM 15 MG/1
15 CAPSULE ORAL NIGHTLY PRN
Qty: 30 CAP | Refills: 2 | Status: ON HOLD
Start: 2020-03-31 | End: 2020-04-14

## 2020-03-31 RX ORDER — TEMAZEPAM 15 MG/1
CAPSULE ORAL
Qty: 30 CAP | OUTPATIENT
Start: 2020-03-31

## 2020-03-31 NOTE — TELEPHONE ENCOUNTER
Rx faxed to   [id number] - [place], [place] - [address] AT [facility] [address]  [address] [address]  Phone: [phone] Fax: [phone]

## 2020-03-31 NOTE — TELEPHONE ENCOUNTER
Have we ever prescribed this med? Yes.  If yes, what date? 02/18/2020    DENIED; THIS WAS JUST FILLED WITH 2 REFILLS

## 2020-03-31 NOTE — TELEPHONE ENCOUNTER
Patient called to request his medication changed to [facility] on [address] because he is in town doing his cancer treatments

## 2020-04-01 ENCOUNTER — APPOINTMENT (OUTPATIENT)
Dept: RADIATION ONCOLOGY | Facility: MEDICAL CENTER | Age: 65
End: 2020-04-01
Payer: COMMERCIAL

## 2020-04-01 ENCOUNTER — HOSPITAL ENCOUNTER (OUTPATIENT)
Dept: RADIATION ONCOLOGY | Facility: MEDICAL CENTER | Age: 65
End: 2020-04-30
Attending: RADIOLOGY
Payer: COMMERCIAL

## 2020-04-01 ENCOUNTER — DOCUMENTATION (OUTPATIENT)
Dept: NUTRITION | Facility: MEDICAL CENTER | Age: 65
End: 2020-04-01

## 2020-04-01 ENCOUNTER — TELEPHONE (OUTPATIENT)
Dept: RADIATION ONCOLOGY | Facility: MEDICAL CENTER | Age: 65
End: 2020-04-01

## 2020-04-01 VITALS
BODY MASS INDEX: 29.26 KG/M2 | SYSTOLIC BLOOD PRESSURE: 111 MMHG | HEART RATE: 70 BPM | WEIGHT: 227.92 LBS | TEMPERATURE: 97.6 F | DIASTOLIC BLOOD PRESSURE: 64 MMHG

## 2020-04-01 DIAGNOSIS — C15.5 MALIGNANT NEOPLASM OF LOWER THIRD OF ESOPHAGUS (HCC): ICD-10-CM

## 2020-04-01 LAB
CHEMOTHERAPY INFUSION START DATE: NORMAL
CHEMOTHERAPY RECORDS: 1.8
CHEMOTHERAPY RECORDS: 4500
CHEMOTHERAPY RECORDS: NORMAL
CHEMOTHERAPY RX CANCER: NORMAL
DATE 1ST CHEMO CANCER: NORMAL
RAD ONC ARIA COURSE LAST TREATMENT DATE: NORMAL
RAD ONC ARIA COURSE TREATMENT ELAPSED DAYS: NORMAL
RAD ONC ARIA REFERENCE POINT DOSAGE GIVEN TO DATE: 5.4
RAD ONC ARIA REFERENCE POINT DOSAGE GIVEN TO DATE: 5.53
RAD ONC ARIA REFERENCE POINT ID: NORMAL
RAD ONC ARIA REFERENCE POINT ID: NORMAL
RAD ONC ARIA REFERENCE POINT SESSION DOSAGE GIVEN: 1.8
RAD ONC ARIA REFERENCE POINT SESSION DOSAGE GIVEN: 1.84

## 2020-04-01 PROCEDURE — 77014 PR CT GUIDANCE PLACEMENT RAD THERAPY FIELDS: CPT | Mod: 26 | Performed by: RADIOLOGY

## 2020-04-01 PROCEDURE — 77336 RADIATION PHYSICS CONSULT: CPT | Performed by: RADIOLOGY

## 2020-04-01 PROCEDURE — 77386 HCHG IMRT DELIVERY COMPLEX: CPT | Performed by: RADIOLOGY

## 2020-04-01 RX ORDER — ONDANSETRON 8 MG/1
8 TABLET, ORALLY DISINTEGRATING ORAL EVERY 12 HOURS PRN
COMMUNITY
Start: 2020-03-17 | End: 2020-06-17

## 2020-04-01 RX ORDER — SUCRALFATE 1 G/1
1 TABLET ORAL
COMMUNITY
Start: 2020-03-06 | End: 2020-04-10

## 2020-04-01 RX ORDER — PROCHLORPERAZINE MALEATE 10 MG
10 TABLET ORAL EVERY 8 HOURS PRN
COMMUNITY
Start: 2020-03-16 | End: 2020-06-17

## 2020-04-01 ASSESSMENT — PAIN SCALES - GENERAL: PAINLEVEL: NO PAIN

## 2020-04-01 NOTE — PROGRESS NOTES
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 Prior stent to the LAD diagonal had mild in-stent restenosis. 30% disease is noted in the right coronary artery.   • Cancer ([facility]) 03/05/2020    Esophagus CURRENT   • Chronic airway obstruction, not elsewhere classified    • Congestive heart failure ([facility])    • EMPHYSEMA     COPD   • Essential hypertension, benign 2/13/2014   • High cholesterol    • HLD (hyperlipidemia) 2/13/2014   • Hypertension 2013    states well controlled on meds   • Infectious disease     10/5/14 daughter and granddaughter had colds   • ADRYAN on CPAP 6/28/2017    bipap   • Other and unspecified angina pectoris    • PAF (paroxysmal atrial fibrillation) ([facility]) 7/26/2015    Residual of afib   • Pain 2013    6/10   • Pain 2014    4/10 neck   • Pneumonia     2014   • Psychiatric problem 03/05/2020    anxious   • Supplemental oxygen dependent     2.5 liters at night/no longer used   • Unspecified disorder of thyroid 2010    on synthroid   • Unspecified hemorrhagic conditions     bruises easily related to plavix     RD met to introduce self and nutrition services. MD requested RD discuss feeding tube potential as well. RD met with pt, accompanied by daughter. States is having difficulties swallowing pills and most foods. Reports had some split pea soup the other day and was throwing this up within a few minutes as it was getting caught in esophagus. Mentions has been  "trying to drink lots of liquids, though sometimes water gets caught too. Mentions having to pureed all foods in order to get them down. Reports weight loss within the past 3-4 months. Mentions weighing 265 lbs during Jessie last year. Has questions about protein powders and how many supplemental shakes could one have in a day. Asks questions about potential feeding tube placement. Denies additional questions/concerns at this time.     Assessment:  • Pertinent Labs: reviewed (3/6/20 lab work)  • Pertinent Meds: restoril, advair, zofran, compazine, carafate, brilinta, lasix, lipitor, K-dur  • Weight: 227 lbs, weighed today during OTV  • Height: 6'2\"  • BMI: 29  • Overweight BMI classification    Weight History from Chart:  244 lbs on 3/6/20  265 lbs on 9/5/19    Weight Change/Malnutrition Risk: Pt reports 38 lb weight loss within past ~ 3 months, which appears to be supported through chart review. Pt meets criteria for severe malnutrition in context of chronic disease related to malignant neoplasm of esophagus as evidenced by likely <50%  Intake of estimated nutrition needs within past > 2 weeks as well as severe 14% loss within 3 months.     Interventions:  • Introduced self and discussed role of dietitian throughout treatment process   • Thoroughly discussed potential J-tube placement and feeding implications. Answered all questions to pt satisfaction. Reinforced that RD will provide service along the way.   • Discussed case with MD, pt likely going to undergo esophagectomy-consult placed to Dr. Ganser for review. Pt elects to not undergo J-tube placement at this time, though will likely need if esophagectomy remains within POC.   • Pt verbalizes that would like to try harder to maintain weight and will consider feeding tube placement if goes below 220 lbs. RD to continue to monitor. Per RD opinion, would certainly benefit from J-tube placement given severity of dysphagia and weight loss.   • Increase meal and " snack frequency to 4-6 x/day. Provided high protein-calorie add in sheet. Together, pt and RD strategized ways to increase calories in foods that is able to consume.   • Provided samples of Boost VHC, Ensure Enlive, and unflavored Unjury protein powder.     Pt reports understanding and was receptive to information provided.   RD provided contact information. Encouraged pt to reach out as questions/concerns arise.   RD following and to make further recommendations as indicated.  [phone]

## 2020-04-01 NOTE — ON TREATMENT VISIT
ON TREATMENT  NOTE  RADIATION ONCOLOGY DEPARTMENT    Patient name:  Norberto White    Primary Physician:  Alhaji Fenton M.D. MRN: [id number]  CSN: [id number]   Referring physician:  Jerry Bates M.D. : 1955, 64 y.o.     ENCOUNTER DATE:  20    DIAGNOSIS:  Visit Diagnoses     ICD-10-CM   1. Malignant neoplasm of lower third of esophagus (HCC) C15.5     Malignant neoplasm of lower third of esophagus (HCC)  Staging form: Esophagus - Adenocarcinoma, AJCC 8th Edition  - Clinical: Stage III (cT3, cN0, cM0, G3) - Signed by Abelardo Arisa M.D. on 3/16/2020  Total positive nodes: 0  Histologic grading system: 3 grade system      TREATMENT SUMMARY:  [facility] Treatment Information        Some values may be hidden. Unless noted otherwise, only the newest values recorded on each date are displayed.         [facility]ria Treatment Summary 20   Course First Treatment Date 2020   Course Last Treatment Date 2020   Esophagus Plan from Course C1_Esophagus   Fraction 3 of 25   Elapsed Course Days 2 @    Prescribed Fraction Dose 180 cGy   Prescribed Total Dose 4,500 cGy   Esophagus cp Reference Point from Course C1_Esophagus   Elapsed Course Days 2 @    Session Dose 184 cGy   Total Dose 553 cGy   PTV45 Reference Point from Course C1_Esophagus   Elapsed Course Days 2 @    Session Dose 180 cGy   Total Dose 540 cGy             SUBJECTIVE:  Well appearing      VITAL SIGNS:  KPS: 100, Fully active, able to carry on all pre-disease performed without restriction (ECOG equivalent 0)  Encounter Vitals  Temperature: 36.4 °C (97.6 °F)  Temp src: Temporal  Blood Pressure: 111/64  Pulse: 70  Weight: 103.4 kg (227 lb 14.7 oz)  Pain Score: No pain  Pain Assessment 2020 3/16/2020   Pain Assessment Denies Pain Denies Pain   Pain Score 0 0   Some recent data might be hidden          PHYSICAL EXAM:    Physical Exam  Vitals signs reviewed.   Constitutional:       Appearance: Normal  appearance.   Neurological:      Mental Status: He is alert.          Toxicity Assessment 4/1/2020   Toxicity Assessment Abdomen   Fatigue (lethargy, malaise, asthenia) None   Radiation Dermatitis None   Anorexia Loss of appetite   Dehydration None   Diarrhea w/o Colostomy None   Nausea None   Vomiting None   Dyspepsia/heartburn None   Dysphagia, Esophagitis, odynophagoa (painful swallowing) Mild dysphagia, but can eat regular diet   Gastritis None   Abdominal Pain or cramping None       CURRENT MEDICATIONS:    Current Outpatient Medications:   •  ondansetron (ZOFRAN ODT) 8 MG TABLET DISPERSIBLE, , Disp: , Rfl:   •  prochlorperazine (COMPAZINE) 10 MG Tab, , Disp: , Rfl:   •  nystatin (MYCOSTATIN) 405626 UNIT/ML Suspension, , Disp: , Rfl:   •  sucralfate (CARAFATE) 1 GM Tab, , Disp: , Rfl:   •  temazepam (RESTORIL) 15 MG Cap, Take 1 Cap by mouth at bedtime as needed for Sleep for up to 90 days., Disp: 30 Cap, Rfl: 2  •  SPIRIVA HANDIHALER 18 MCG Cap, INHALE THE CONTENTS OF 1  CAPSULE VIA HANDIHALER  DAILY, Disp: 90 Cap, Rfl: 3  •  fluticasone-salmeterol (ADVAIR DISKUS) 500-50 MCG/DOSE AEROSOL POWDER, BREATH ACTIVATED, INHALE 1 PUFF BY MOUTH  EVERY 12 HOURS, Disp: 1 Inhaler, Rfl: 5  •  BRILINTA 90 MG Tab tablet, , Disp: , Rfl:   •  metoprolol SR (TOPROL XL) 25 MG TABLET SR 24 HR, , Disp: , Rfl:   •  furosemide (LASIX) 20 MG Tab, TAKE 1 TABLET DAILY, Disp: 90 Tab, Rfl: 3  •  atorvastatin (LIPITOR) 80 MG tablet, TAKE 1 TABLET DAILY, Disp: 90 Tab, Rfl: 3  •  doxazosin (CARDURA) 4 MG Tab, TAKE 1 TABLET DAILY, Disp: 90 Tab, Rfl: 3  •  ezetimibe (ZETIA) 10 MG Tab, TAKE 1 TABLET DAILY, Disp: 90 Tab, Rfl: 3  •  potassium chloride SA (K-DUR) 10 MEQ Tab CR, Take 1 Tab by mouth every day., Disp: 90 Tab, Rfl: 3  •  benazepril (LOTENSIN) 20 MG Tab, Take 1 Tab by mouth every day., Disp: 90 Tab, Rfl: 3  •  Albuterol Sulfate (PROAIR RESPICLICK) 108 (90 Base) MCG/ACT AEROSOL POWDER, BREATH ACTIVATED, Inhale 2 Puffs by mouth as needed  (for shortness of breath, wheezing.). every 4-6 hours as needed., Disp: 1 Each, Rfl: 3  •  nitroglycerin (NITROSTAT) 0.4 MG SL Tab, Place 1 Tab under tongue as needed for Chest Pain (Place 1 tablet under the tongue every 5 minutes up to 3 doses as needed for chest pain)., Disp: 75 Tab, Rfl: 3  •  omeprazole (PRILOSEC) 40 MG delayed-release capsule, 40 mg., Disp: , Rfl:   •  levothyroxine (SYNTHROID) 75 MCG Tab, Take 75 mcg by mouth daily., Disp: , Rfl:   •  PREVIDENT 5000 SENSITIVE 1.1-5 % Paste, , Disp: , Rfl: 0  •  Melatonin 10 MG Tab, Take  by mouth every bedtime., Disp: , Rfl:   •  Cranberry 500 MG Cap, Take  by mouth every day., Disp: , Rfl:   •  Zinc 50 MG Cap, Take 50 mg by mouth every day., Disp: , Rfl:   •  diphenhydrAMINE (BENADRYL) 25 MG Tab, Take 25 mg by mouth every 6 hours as needed for Sleep., Disp: , Rfl:   •  aspirin EC (ECOTRIN) 81 MG TBEC, Take 81 mg by mouth every day., Disp: , Rfl:   •  tamsulosin (FLOMAX) 0.4 MG capsule, Take 0.4 mg by mouth ONE-HALF HOUR AFTER BREAKFAST., Disp: , Rfl:     LABORATORY DATA:   Lab Results   Component Value Date/Time    SODIUM 138 03/06/2020 12:10 PM    POTASSIUM 4.2 03/06/2020 12:10 PM    CHLORIDE 105 03/06/2020 12:10 PM    CO2 22 03/06/2020 12:10 PM    GLUCOSE 88 03/06/2020 12:10 PM    BUN 18 03/06/2020 12:10 PM    CREATININE 0.97 03/06/2020 12:10 PM         Lab Results   Component Value Date/Time    WBC 7.2 03/06/2020 12:10 PM    HEMOGLOBIN 14.7 03/06/2020 12:10 PM    HEMATOCRIT 43.4 03/06/2020 12:10 PM    MCV 90.2 03/06/2020 12:10 PM    PLATELETCT 166 03/06/2020 12:10 PM        RADIOLOGY DATA:  Qt-cizmu-ehzhl Base To Mid-thigh    Result Date: 3/23/2020  3/23/2020 8:15 AM HISTORY/REASON FOR EXAM:  Adenocarcinoma of the distal esophagus. T3 disease by endoscopic ultrasound TECHNIQUE/EXAM DESCRIPTION AND NUMBER OF VIEWS: PET body imaging. Initially, 15.89 mCi F-18 FDG was administered intravenously under standardized conditions. Approximately 45 minutes after FDG  administration, the patient was placed in the supine position on the PET CT table. Blood glucose level was 103 mg/dL. Low dose spiral CT imaging was performed from the skull base to the mid thighs. PET imaging was then performed from the skull base to the mid thighs. CT images, PET images, and PET/CT fused images were reviewed on a PACS 3D workstation. The limited non-contrast CT data are used primarily for attenuation correction and anatomic correlation.  Evaluation of solid organs and bowel are especially limited utilizing this technique. COMPARISON: None. FINDINGS: Head and neck: Uptake within the oropharynx is fairly symmetric and is likely physiologic. Chest: There is an air-fluid level in the dilated esophagus. Focal activity at the gastroesophageal junction correlates with wall thickening and extends into the gastric cardia. Maximum SUV is approximately 9.6. Activity extends superior to inferior for approximately 3.4 cm. Abdomen and pelvis: No abnormal focal FDG activity. Musculoskeletal: Uptake within the cervical musculature is more pronounced on the left, likely physiologic. Incidental findings on CT: There is chronic mucosal thickening in the right maxillary sinus. Scattered arterial calcifications are present. Postoperative changes are present from prior CABG. Coronary arteries are densely calcified. The heart is enlarged.  A hypodense lesion in the left hepatic lobe is consistent with a cyst. Calculi layer in the gallbladder. There is residual barium within the colon. There are scattered colonic diverticula. Degenerative changes are in the spine. Prior cervical fusion is noted.     1.  FDG activity with associated wall thickening at the gastroesophageal junction extending to the gastric cardia is consistent with known malignancy. Mildly dilated fluid-filled esophagus. 2.  No metastatic disease identified.      IMPRESSION:  Malignant neoplasm of lower third of esophagus (HCC)  Staging form: Esophagus -  Adenocarcinoma, AJCC 8th Edition  - Clinical: Stage III (cT3, cN0, cM0, G3) - Signed by Abelardo Arias M.D. on 3/16/2020  Total positive nodes: 0  Histologic grading system: 3 grade system      PLAN:  No change in treatment plan    Disposition:  Treatment plan reviewed. Questions answered. Continue therapy outlined.     Abelardo Arias M.D.    Orders Placed This Encounter   • REFERRAL TO GENERAL SURGERY   • ondansetron (ZOFRAN ODT) 8 MG TABLET DISPERSIBLE   • prochlorperazine (COMPAZINE) 10 MG Tab   • nystatin (MYCOSTATIN) 658045 UNIT/ML Suspension   • sucralfate (CARAFATE) 1 GM Tab

## 2020-04-01 NOTE — TELEPHONE ENCOUNTER
Reviewed in GI tumor board 4/1/20, PET CT showed uptake in GEJ but no distant disease or nodes. Will continue with CROSS regimen. Referred to Dr. Ganser for esophagectomy and possible surgical J-tube placement as patient having difficulty with pills.

## 2020-04-02 ENCOUNTER — HOSPITAL ENCOUNTER (OUTPATIENT)
Dept: RADIATION ONCOLOGY | Facility: MEDICAL CENTER | Age: 65
End: 2020-04-02
Payer: COMMERCIAL

## 2020-04-02 LAB
CHEMOTHERAPY INFUSION START DATE: NORMAL
CHEMOTHERAPY RECORDS: 1.8
CHEMOTHERAPY RECORDS: 4500
CHEMOTHERAPY RECORDS: NORMAL
CHEMOTHERAPY RX CANCER: NORMAL
DATE 1ST CHEMO CANCER: NORMAL
RAD ONC ARIA COURSE LAST TREATMENT DATE: NORMAL
RAD ONC ARIA COURSE TREATMENT ELAPSED DAYS: NORMAL
RAD ONC ARIA REFERENCE POINT DOSAGE GIVEN TO DATE: 7.2
RAD ONC ARIA REFERENCE POINT DOSAGE GIVEN TO DATE: 7.38
RAD ONC ARIA REFERENCE POINT ID: NORMAL
RAD ONC ARIA REFERENCE POINT ID: NORMAL
RAD ONC ARIA REFERENCE POINT SESSION DOSAGE GIVEN: 1.8
RAD ONC ARIA REFERENCE POINT SESSION DOSAGE GIVEN: 1.84

## 2020-04-02 PROCEDURE — 77386 HCHG IMRT DELIVERY COMPLEX: CPT | Performed by: RADIOLOGY

## 2020-04-02 PROCEDURE — 77014 PR CT GUIDANCE PLACEMENT RAD THERAPY FIELDS: CPT | Mod: 26 | Performed by: RADIOLOGY

## 2020-04-03 ENCOUNTER — HOSPITAL ENCOUNTER (OUTPATIENT)
Dept: RADIATION ONCOLOGY | Facility: MEDICAL CENTER | Age: 65
End: 2020-04-03
Payer: COMMERCIAL

## 2020-04-03 LAB
CHEMOTHERAPY INFUSION START DATE: NORMAL
CHEMOTHERAPY RECORDS: 1.8
CHEMOTHERAPY RECORDS: 4500
CHEMOTHERAPY RECORDS: NORMAL
CHEMOTHERAPY RX CANCER: NORMAL
DATE 1ST CHEMO CANCER: NORMAL
RAD ONC ARIA COURSE LAST TREATMENT DATE: NORMAL
RAD ONC ARIA COURSE TREATMENT ELAPSED DAYS: NORMAL
RAD ONC ARIA REFERENCE POINT DOSAGE GIVEN TO DATE: 9
RAD ONC ARIA REFERENCE POINT DOSAGE GIVEN TO DATE: 9.22
RAD ONC ARIA REFERENCE POINT ID: NORMAL
RAD ONC ARIA REFERENCE POINT ID: NORMAL
RAD ONC ARIA REFERENCE POINT SESSION DOSAGE GIVEN: 1.8
RAD ONC ARIA REFERENCE POINT SESSION DOSAGE GIVEN: 1.84

## 2020-04-03 PROCEDURE — 77014 PR CT GUIDANCE PLACEMENT RAD THERAPY FIELDS: CPT | Mod: 26 | Performed by: RADIOLOGY

## 2020-04-03 PROCEDURE — 77386 HCHG IMRT DELIVERY COMPLEX: CPT | Performed by: RADIOLOGY

## 2020-04-03 PROCEDURE — 77427 RADIATION TX MANAGEMENT X5: CPT | Performed by: RADIOLOGY

## 2020-04-06 ENCOUNTER — HOSPITAL ENCOUNTER (OUTPATIENT)
Dept: RADIATION ONCOLOGY | Facility: MEDICAL CENTER | Age: 65
End: 2020-04-06
Payer: COMMERCIAL

## 2020-04-06 LAB
CHEMOTHERAPY INFUSION START DATE: NORMAL
CHEMOTHERAPY RECORDS: 1.8
CHEMOTHERAPY RECORDS: 4500
CHEMOTHERAPY RECORDS: NORMAL
CHEMOTHERAPY RX CANCER: NORMAL
DATE 1ST CHEMO CANCER: NORMAL
RAD ONC ARIA COURSE LAST TREATMENT DATE: NORMAL
RAD ONC ARIA COURSE TREATMENT ELAPSED DAYS: NORMAL
RAD ONC ARIA REFERENCE POINT DOSAGE GIVEN TO DATE: 10.8
RAD ONC ARIA REFERENCE POINT DOSAGE GIVEN TO DATE: 11.07
RAD ONC ARIA REFERENCE POINT ID: NORMAL
RAD ONC ARIA REFERENCE POINT ID: NORMAL
RAD ONC ARIA REFERENCE POINT SESSION DOSAGE GIVEN: 1.8
RAD ONC ARIA REFERENCE POINT SESSION DOSAGE GIVEN: 1.84

## 2020-04-06 PROCEDURE — 77014 PR CT GUIDANCE PLACEMENT RAD THERAPY FIELDS: CPT | Mod: 26 | Performed by: RADIOLOGY

## 2020-04-06 PROCEDURE — 77386 HCHG IMRT DELIVERY COMPLEX: CPT | Performed by: RADIOLOGY

## 2020-04-07 ENCOUNTER — HOSPITAL ENCOUNTER (OUTPATIENT)
Dept: RADIATION ONCOLOGY | Facility: MEDICAL CENTER | Age: 65
End: 2020-04-07
Payer: COMMERCIAL

## 2020-04-07 ENCOUNTER — HOSPITAL ENCOUNTER (OUTPATIENT)
Dept: LAB | Facility: MEDICAL CENTER | Age: 65
End: 2020-04-07
Attending: NURSE PRACTITIONER
Payer: COMMERCIAL

## 2020-04-07 LAB
ALBUMIN SERPL BCP-MCNC: 4.4 G/DL (ref 3.2–4.9)
ALBUMIN/GLOB SERPL: 1.8 G/DL
ALP SERPL-CCNC: 82 U/L (ref 30–99)
ALT SERPL-CCNC: 19 U/L (ref 2–50)
ANION GAP SERPL CALC-SCNC: 15 MMOL/L (ref 7–16)
AST SERPL-CCNC: 16 U/L (ref 12–45)
BILIRUB SERPL-MCNC: 2.1 MG/DL (ref 0.1–1.5)
BUN SERPL-MCNC: 23 MG/DL (ref 8–22)
CALCIUM SERPL-MCNC: 9.2 MG/DL (ref 8.5–10.5)
CHEMOTHERAPY INFUSION START DATE: NORMAL
CHEMOTHERAPY RECORDS: 1.8
CHEMOTHERAPY RECORDS: 4500
CHEMOTHERAPY RECORDS: NORMAL
CHEMOTHERAPY RX CANCER: NORMAL
CHLORIDE SERPL-SCNC: 101 MMOL/L (ref 96–112)
CO2 SERPL-SCNC: 20 MMOL/L (ref 20–33)
CREAT SERPL-MCNC: 1.03 MG/DL (ref 0.5–1.4)
DATE 1ST CHEMO CANCER: NORMAL
GLOBULIN SER CALC-MCNC: 2.5 G/DL (ref 1.9–3.5)
GLUCOSE SERPL-MCNC: 111 MG/DL (ref 65–99)
POTASSIUM SERPL-SCNC: 5 MMOL/L (ref 3.6–5.5)
PROT SERPL-MCNC: 6.9 G/DL (ref 6–8.2)
RAD ONC ARIA COURSE LAST TREATMENT DATE: NORMAL
RAD ONC ARIA COURSE TREATMENT ELAPSED DAYS: NORMAL
RAD ONC ARIA REFERENCE POINT DOSAGE GIVEN TO DATE: 12.6
RAD ONC ARIA REFERENCE POINT DOSAGE GIVEN TO DATE: 12.91
RAD ONC ARIA REFERENCE POINT ID: NORMAL
RAD ONC ARIA REFERENCE POINT ID: NORMAL
RAD ONC ARIA REFERENCE POINT SESSION DOSAGE GIVEN: 1.8
RAD ONC ARIA REFERENCE POINT SESSION DOSAGE GIVEN: 1.84
SODIUM SERPL-SCNC: 136 MMOL/L (ref 135–145)

## 2020-04-07 PROCEDURE — 77014 PR CT GUIDANCE PLACEMENT RAD THERAPY FIELDS: CPT | Mod: 26 | Performed by: RADIOLOGY

## 2020-04-07 PROCEDURE — 80053 COMPREHEN METABOLIC PANEL: CPT

## 2020-04-07 PROCEDURE — 77386 HCHG IMRT DELIVERY COMPLEX: CPT | Performed by: RADIOLOGY

## 2020-04-08 ENCOUNTER — APPOINTMENT (OUTPATIENT)
Dept: RADIATION ONCOLOGY | Facility: MEDICAL CENTER | Age: 65
End: 2020-04-08
Payer: COMMERCIAL

## 2020-04-08 VITALS
WEIGHT: 221 LBS | SYSTOLIC BLOOD PRESSURE: 144 MMHG | TEMPERATURE: 97.4 F | BODY MASS INDEX: 28.37 KG/M2 | DIASTOLIC BLOOD PRESSURE: 88 MMHG | HEART RATE: 70 BPM

## 2020-04-08 LAB
CHEMOTHERAPY INFUSION START DATE: NORMAL
CHEMOTHERAPY RECORDS: 1.8
CHEMOTHERAPY RECORDS: 4500
CHEMOTHERAPY RECORDS: NORMAL
CHEMOTHERAPY RX CANCER: NORMAL
DATE 1ST CHEMO CANCER: NORMAL
RAD ONC ARIA COURSE LAST TREATMENT DATE: NORMAL
RAD ONC ARIA COURSE TREATMENT ELAPSED DAYS: NORMAL
RAD ONC ARIA REFERENCE POINT DOSAGE GIVEN TO DATE: 14.4
RAD ONC ARIA REFERENCE POINT DOSAGE GIVEN TO DATE: 14.76
RAD ONC ARIA REFERENCE POINT ID: NORMAL
RAD ONC ARIA REFERENCE POINT ID: NORMAL
RAD ONC ARIA REFERENCE POINT SESSION DOSAGE GIVEN: 1.8
RAD ONC ARIA REFERENCE POINT SESSION DOSAGE GIVEN: 1.84

## 2020-04-08 PROCEDURE — 77386 HCHG IMRT DELIVERY COMPLEX: CPT | Performed by: RADIOLOGY

## 2020-04-08 PROCEDURE — 77336 RADIATION PHYSICS CONSULT: CPT | Performed by: RADIOLOGY

## 2020-04-08 PROCEDURE — 77014 PR CT GUIDANCE PLACEMENT RAD THERAPY FIELDS: CPT | Mod: 26 | Performed by: RADIOLOGY

## 2020-04-08 ASSESSMENT — FIBROSIS 4 INDEX: FIB4 SCORE: 1.39

## 2020-04-08 ASSESSMENT — PAIN SCALES - GENERAL: PAINLEVEL: 1=MINIMAL PAIN

## 2020-04-08 NOTE — ON TREATMENT VISIT
"  ON TREATMENT  NOTE  RADIATION ONCOLOGY DEPARTMENT    Patient name:  Norberto White    Primary Physician:  Alhaji Fenton M.D. MRN: [id number]  CSN: [id number]   Referring physician:  Jerry Bates M.D. : 1955, 64 y.o.     ENCOUNTER DATE:  20    DIAGNOSIS:  Malignant neoplasm of lower third of esophagus (HCC)  Staging form: Esophagus - Adenocarcinoma, AJCC 8th Edition  - Clinical: Stage III (cT3, cN0, cM0, G3) - Signed by Abelardo Arias M.D. on 3/16/2020  Total positive nodes: 0  Histologic grading system: 3 grade system      TREATMENT SUMMARY:  Aria Treatment Information        Some values may be hidden. Unless noted otherwise, only the newest values recorded on each date are displayed.         Aria Treatment Summary 20   Course First Treatment Date 2020   Course Last Treatment Date 2020   Esophagus Plan from Course C1_Esophagus   Fraction 8 of 25   Elapsed Course Days 9 @    Prescribed Fraction Dose 180 cGy   Prescribed Total Dose 4,500 cGy   Esophagus cp Reference Point from Course C1_Esophagus   Elapsed Course Days 9 @ [id number]   Session Dose 184 cGy   Total Dose 1,476 cGy   PTV45 Reference Point from Course C1_Esophagus   Elapsed Course Days  @    Session Dose 180 cGy   Total Dose 1,440 cGy             SUBJECTIVE:  Well appearing      VITAL SIGNS:  KPS: 100, Fully active, able to carry on all pre-disease performed without restriction (ECOG equivalent 0)  Encounter Vitals  Temperature: 36.3 °C (97.4 °F)  Blood Pressure: 144/88  Pulse: 70  Weight: 100.2 kg (221 lb)  Pain Score: 1=Minimal Pain  Pain Assessment 2020   Pain Assessment Acute Pain Denies Pain   Pain Score 1 0   What increases pain? pt has intermittent pain in stomach especially after eating -   What decreases pain? pt states\" I'm not a pill dileep\" -   Some recent data might be hidden          PHYSICAL EXAM:    Physical Exam  Vitals signs reviewed.   Constitutional:       " Appearance: Normal appearance.   HENT:      Head: Normocephalic and atraumatic.   Neurological:      Mental Status: He is alert.          Toxicity Assessment 4/8/2020 4/1/2020   Toxicity Assessment Abdomen Abdomen   Fatigue (lethargy, malaise, asthenia) Increased fatigue over baseline, but not altering normal activities None   Radiation Dermatitis None None   Anorexia None Loss of appetite   Dehydration None None   Diarrhea w/o Colostomy None None   Nausea None None   Vomiting None None   Dyspepsia/heartburn None None   Dysphagia, Esophagitis, odynophagoa (painful swallowing) Mild dysphagia, but can eat regular diet Mild dysphagia, but can eat regular diet   Gastritis None None   Abdominal Pain or cramping Mild pain not interfering with function None       CURRENT MEDICATIONS:    Current Outpatient Medications:   •  ondansetron (ZOFRAN ODT) 8 MG TABLET DISPERSIBLE, , Disp: , Rfl:   •  prochlorperazine (COMPAZINE) 10 MG Tab, , Disp: , Rfl:   •  nystatin (MYCOSTATIN) 788808 UNIT/ML Suspension, , Disp: , Rfl:   •  sucralfate (CARAFATE) 1 GM Tab, , Disp: , Rfl:   •  temazepam (RESTORIL) 15 MG Cap, Take 1 Cap by mouth at bedtime as needed for Sleep for up to 90 days., Disp: 30 Cap, Rfl: 2  •  SPIRIVA HANDIHALER 18 MCG Cap, INHALE THE CONTENTS OF 1  CAPSULE VIA HANDIHALER  DAILY, Disp: 90 Cap, Rfl: 3  •  fluticasone-salmeterol (ADVAIR DISKUS) 500-50 MCG/DOSE AEROSOL POWDER, BREATH ACTIVATED, INHALE 1 PUFF BY MOUTH  EVERY 12 HOURS, Disp: 1 Inhaler, Rfl: 5  •  BRILINTA 90 MG Tab tablet, , Disp: , Rfl:   •  metoprolol SR (TOPROL XL) 25 MG TABLET SR 24 HR, , Disp: , Rfl:   •  furosemide (LASIX) 20 MG Tab, TAKE 1 TABLET DAILY, Disp: 90 Tab, Rfl: 3  •  atorvastatin (LIPITOR) 80 MG tablet, TAKE 1 TABLET DAILY, Disp: 90 Tab, Rfl: 3  •  doxazosin (CARDURA) 4 MG Tab, TAKE 1 TABLET DAILY, Disp: 90 Tab, Rfl: 3  •  ezetimibe (ZETIA) 10 MG Tab, TAKE 1 TABLET DAILY, Disp: 90 Tab, Rfl: 3  •  potassium chloride SA (K-DUR) 10 MEQ Tab CR,  Take 1 Tab by mouth every day., Disp: 90 Tab, Rfl: 3  •  benazepril (LOTENSIN) 20 MG Tab, Take 1 Tab by mouth every day., Disp: 90 Tab, Rfl: 3  •  Albuterol Sulfate (PROAIR RESPICLICK) 108 (90 Base) MCG/ACT AEROSOL POWDER, BREATH ACTIVATED, Inhale 2 Puffs by mouth as needed (for shortness of breath, wheezing.). every 4-6 hours as needed., Disp: 1 Each, Rfl: 3  •  nitroglycerin (NITROSTAT) 0.4 MG SL Tab, Place 1 Tab under tongue as needed for Chest Pain (Place 1 tablet under the tongue every 5 minutes up to 3 doses as needed for chest pain)., Disp: 75 Tab, Rfl: 3  •  omeprazole (PRILOSEC) 40 MG delayed-release capsule, 40 mg., Disp: , Rfl:   •  levothyroxine (SYNTHROID) 75 MCG Tab, Take 75 mcg by mouth daily., Disp: , Rfl:   •  PREVIDENT 5000 SENSITIVE 1.1-5 % Paste, , Disp: , Rfl: 0  •  Melatonin 10 MG Tab, Take  by mouth every bedtime., Disp: , Rfl:   •  Cranberry 500 MG Cap, Take  by mouth every day., Disp: , Rfl:   •  Zinc 50 MG Cap, Take 50 mg by mouth every day., Disp: , Rfl:   •  diphenhydrAMINE (BENADRYL) 25 MG Tab, Take 25 mg by mouth every 6 hours as needed for Sleep., Disp: , Rfl:   •  aspirin EC (ECOTRIN) 81 MG TBEC, Take 81 mg by mouth every day., Disp: , Rfl:   •  tamsulosin (FLOMAX) 0.4 MG capsule, Take 0.4 mg by mouth ONE-HALF HOUR AFTER BREAKFAST., Disp: , Rfl:     LABORATORY DATA:   Lab Results   Component Value Date/Time    SODIUM 136 04/07/2020 11:40 AM    POTASSIUM 5.0 04/07/2020 11:40 AM    CHLORIDE 101 04/07/2020 11:40 AM    CO2 20 04/07/2020 11:40 AM    GLUCOSE 111 (H) 04/07/2020 11:40 AM    BUN 23 (H) 04/07/2020 11:40 AM    CREATININE 1.03 04/07/2020 11:40 AM         Lab Results   Component Value Date/Time    WBC 7.2 03/06/2020 12:10 PM    HEMOGLOBIN 14.7 03/06/2020 12:10 PM    HEMATOCRIT 43.4 03/06/2020 12:10 PM    MCV 90.2 03/06/2020 12:10 PM    PLATELETCT 166 03/06/2020 12:10 PM        RADIOLOGY DATA:  Nw-dhrli-szjoj Base To Mid-thigh    Result Date: 3/23/2020  3/23/2020 8:15 AM  HISTORY/REASON FOR EXAM:  Adenocarcinoma of the distal esophagus. T3 disease by endoscopic ultrasound TECHNIQUE/EXAM DESCRIPTION AND NUMBER OF VIEWS: PET body imaging. Initially, 15.89 mCi F-18 FDG was administered intravenously under standardized conditions. Approximately 45 minutes after FDG administration, the patient was placed in the supine position on the PET CT table. Blood glucose level was 103 mg/dL. Low dose spiral CT imaging was performed from the skull base to the mid thighs. PET imaging was then performed from the skull base to the mid thighs. CT images, PET images, and PET/CT fused images were reviewed on a PACS 3D workstation. The limited non-contrast CT data are used primarily for attenuation correction and anatomic correlation.  Evaluation of solid organs and bowel are especially limited utilizing this technique. COMPARISON: None. FINDINGS: Head and neck: Uptake within the oropharynx is fairly symmetric and is likely physiologic. Chest: There is an air-fluid level in the dilated esophagus. Focal activity at the gastroesophageal junction correlates with wall thickening and extends into the gastric cardia. Maximum SUV is approximately 9.6. Activity extends superior to inferior for approximately 3.4 cm. Abdomen and pelvis: No abnormal focal FDG activity. Musculoskeletal: Uptake within the cervical musculature is more pronounced on the left, likely physiologic. Incidental findings on CT: There is chronic mucosal thickening in the right maxillary sinus. Scattered arterial calcifications are present. Postoperative changes are present from prior CABG. Coronary arteries are densely calcified. The heart is enlarged.  A hypodense lesion in the left hepatic lobe is consistent with a cyst. Calculi layer in the gallbladder. There is residual barium within the colon. There are scattered colonic diverticula. Degenerative changes are in the spine. Prior cervical fusion is noted.     1.  FDG activity with associated  wall thickening at the gastroesophageal junction extending to the gastric cardia is consistent with known malignancy. Mildly dilated fluid-filled esophagus. 2.  No metastatic disease identified.      IMPRESSION:  Malignant neoplasm of lower third of esophagus (HCC)  Staging form: Esophagus - Adenocarcinoma, AJCC 8th Edition  - Clinical: Stage III (cT3, cN0, cM0, G3) - Signed by Abelardo Arias M.D. on 3/16/2020  Total positive nodes: 0  Histologic grading system: 3 grade system      PLAN:  No change in treatment plan    Disposition:  Treatment plan reviewed. Questions answered. Continue therapy outlined.     Abelardo Arias M.D.    Orders Placed This Encounter   • Rad Onc Aria Session Summary

## 2020-04-09 ENCOUNTER — HOSPITAL ENCOUNTER (OUTPATIENT)
Dept: RADIATION ONCOLOGY | Facility: MEDICAL CENTER | Age: 65
End: 2020-04-09
Payer: COMMERCIAL

## 2020-04-09 ENCOUNTER — DOCUMENTATION (OUTPATIENT)
Dept: NUTRITION | Facility: MEDICAL CENTER | Age: 65
End: 2020-04-09

## 2020-04-09 LAB
CHEMOTHERAPY INFUSION START DATE: NORMAL
CHEMOTHERAPY RECORDS: 1.8
CHEMOTHERAPY RECORDS: 4500
CHEMOTHERAPY RECORDS: NORMAL
CHEMOTHERAPY RX CANCER: NORMAL
DATE 1ST CHEMO CANCER: NORMAL
RAD ONC ARIA COURSE LAST TREATMENT DATE: NORMAL
RAD ONC ARIA COURSE TREATMENT ELAPSED DAYS: NORMAL
RAD ONC ARIA REFERENCE POINT DOSAGE GIVEN TO DATE: 16.2
RAD ONC ARIA REFERENCE POINT DOSAGE GIVEN TO DATE: 16.6
RAD ONC ARIA REFERENCE POINT ID: NORMAL
RAD ONC ARIA REFERENCE POINT ID: NORMAL
RAD ONC ARIA REFERENCE POINT SESSION DOSAGE GIVEN: 1.8
RAD ONC ARIA REFERENCE POINT SESSION DOSAGE GIVEN: 1.84

## 2020-04-09 PROCEDURE — 77014 PR CT GUIDANCE PLACEMENT RAD THERAPY FIELDS: CPT | Mod: 26 | Performed by: RADIOLOGY

## 2020-04-09 PROCEDURE — 77386 HCHG IMRT DELIVERY COMPLEX: CPT | Performed by: RADIOLOGY

## 2020-04-09 NOTE — PROGRESS NOTES
Nutrition Services: Brief Update  Weight: 221 lbs, weighed on 4/8/20  Weight history:  227 lbs, weighed on 4/01/20    Weight Change: pt down 6 lbs within past 1 week, which is a severe 2.6% loss which is severe.     Per RD discussion with RN yesterday, it appears pt is agreeable to placement of J-tube. RD able to visit pt following Xrt. Pt states things have gotten worse and pt is not able to swallow most of his pills and water is coming back up. States was only able to have 2 1/4 cup of soup for the past 3 days. Reports experiencing chest pain, though this went away. Pt continues to discuss concerns about not being able to swallow pills and previous hx of heart attacks. States will want to get medications down through feeding tube if able. Noted pt discussed concerns with not being able to use pump given unsure if IV pole will fit in his house (currently living in [facility] during treatment while here in [place]). Is hoping for device that allows him to be able to infuse formula with more mobility, given pt returns to [place] every weekend.     Assessment:  Male            Nutrition Needs Assessment   cm/kg       Height (inches) 74 188       Weight (lbs)  221 100       Age (years) 64         BMI 28         Total Calorie Needs per HBE x.1.0-1.10 2352 2587 23 kcal/kg   Total Protein Needs (1.2 - 1.5g/kg of CBW) 121 151       Daily Fluids (30ml/kg of CBW) 2009             Plan/Recommend:  • RD recommending use Pivot 1.5 as suitable immune-modulating formula to meet pt's estimated needs at this time given hypermetabolic status increasing caloric and protein needs. J-tube will provide 100% of pt's nutrition needs.   • Recommends 7 cartons of Pivot 1.5, which provides 2,485 kcal, 155 g of protein, and 1260 ml free water.    • Pt would benefit from additional 750 ml of free water in addition to formula via pump.   • Pt confirms insurance as [employer], which is contracted with [facility]. RD to submit information to  establish care as soon as possible. Pt scheduled for J-tube placement on 4/14/20.   • RD to discuss pt concerns with medication via J-tube with Dr. Ganser as pt will need to be instructed on what pills can be crushed or if liquid forms of some medications will be suitable.   • RD to attempt to obtain pump backpack through [facility] given pt will likely need more mobile form of feeding, given pt travels from [place] back to [place] on the weekend and will need to infuse formula during that time. Pt also is unsure if IV pole will fit in housing in [place]. RD to follow-up.     RD to continue to monitor throughout treatment.  Please contact [phone]

## 2020-04-10 ENCOUNTER — HOSPITAL ENCOUNTER (OUTPATIENT)
Dept: RADIATION ONCOLOGY | Facility: MEDICAL CENTER | Age: 65
End: 2020-04-10
Payer: COMMERCIAL

## 2020-04-10 LAB
CHEMOTHERAPY INFUSION START DATE: NORMAL
CHEMOTHERAPY RECORDS: 1.8
CHEMOTHERAPY RECORDS: 4500
CHEMOTHERAPY RECORDS: NORMAL
CHEMOTHERAPY RX CANCER: NORMAL
DATE 1ST CHEMO CANCER: NORMAL
RAD ONC ARIA COURSE LAST TREATMENT DATE: NORMAL
RAD ONC ARIA COURSE TREATMENT ELAPSED DAYS: NORMAL
RAD ONC ARIA REFERENCE POINT DOSAGE GIVEN TO DATE: 18
RAD ONC ARIA REFERENCE POINT DOSAGE GIVEN TO DATE: 18.45
RAD ONC ARIA REFERENCE POINT ID: NORMAL
RAD ONC ARIA REFERENCE POINT ID: NORMAL
RAD ONC ARIA REFERENCE POINT SESSION DOSAGE GIVEN: 1.8
RAD ONC ARIA REFERENCE POINT SESSION DOSAGE GIVEN: 1.84

## 2020-04-10 PROCEDURE — 77386 HCHG IMRT DELIVERY COMPLEX: CPT | Performed by: RADIOLOGY

## 2020-04-10 PROCEDURE — 77427 RADIATION TX MANAGEMENT X5: CPT | Performed by: RADIOLOGY

## 2020-04-10 PROCEDURE — 77014 PR CT GUIDANCE PLACEMENT RAD THERAPY FIELDS: CPT | Mod: 26 | Performed by: RADIOLOGY

## 2020-04-10 NOTE — DISCHARGE PLANNING
Received a VM from Juani at Dr. Ganser's office stating that a referral has been sent to [facility] for TF support and supplies. Patient lives in [place], [place] and has [employer] and there are no  agencies that accept that insurance where he lives.

## 2020-04-13 ENCOUNTER — HOSPITAL ENCOUNTER (OUTPATIENT)
Dept: RADIATION ONCOLOGY | Facility: MEDICAL CENTER | Age: 65
End: 2020-04-13
Payer: COMMERCIAL

## 2020-04-13 ENCOUNTER — ANESTHESIA EVENT (OUTPATIENT)
Dept: SURGERY | Facility: MEDICAL CENTER | Age: 65
End: 2020-04-13
Payer: COMMERCIAL

## 2020-04-13 LAB
CHEMOTHERAPY INFUSION START DATE: NORMAL
CHEMOTHERAPY RECORDS: 1.8
CHEMOTHERAPY RECORDS: 4500
CHEMOTHERAPY RECORDS: NORMAL
CHEMOTHERAPY RX CANCER: NORMAL
DATE 1ST CHEMO CANCER: NORMAL
RAD ONC ARIA COURSE LAST TREATMENT DATE: NORMAL
RAD ONC ARIA COURSE TREATMENT ELAPSED DAYS: NORMAL
RAD ONC ARIA REFERENCE POINT DOSAGE GIVEN TO DATE: 19.8
RAD ONC ARIA REFERENCE POINT DOSAGE GIVEN TO DATE: 20.29
RAD ONC ARIA REFERENCE POINT ID: NORMAL
RAD ONC ARIA REFERENCE POINT ID: NORMAL
RAD ONC ARIA REFERENCE POINT SESSION DOSAGE GIVEN: 1.8
RAD ONC ARIA REFERENCE POINT SESSION DOSAGE GIVEN: 1.84

## 2020-04-13 PROCEDURE — 77386 HCHG IMRT DELIVERY COMPLEX: CPT | Performed by: RADIOLOGY

## 2020-04-13 PROCEDURE — 77014 PR CT GUIDANCE PLACEMENT RAD THERAPY FIELDS: CPT | Mod: 26 | Performed by: RADIOLOGY

## 2020-04-13 NOTE — OR NURSING
COVID-19 Pre-surgery screening:  ?     1. Do you have an undiagnosed respiratory illness or symptoms such as coughing or sneezing?  N (Yes/No)        2. Do you have an unexplained fever greater than 100.4 degrees Fahrenheit or 38 degrees Celsius?  N (Yes/No)  ?     3. Have you had direct exposure to a patient who tested positive for Covid-19?  N (Yes/No)  ?     4. Have you traveled within the last 14 days to [place], [place], [place], [place], or [place]?  N (Yes/No)  ?  Pt. informed re: visitation policy.

## 2020-04-14 ENCOUNTER — HOSPITAL ENCOUNTER (OUTPATIENT)
Dept: RADIATION ONCOLOGY | Facility: MEDICAL CENTER | Age: 65
End: 2020-04-14
Payer: COMMERCIAL

## 2020-04-14 ENCOUNTER — HOSPITAL ENCOUNTER (OUTPATIENT)
Facility: MEDICAL CENTER | Age: 65
End: 2020-04-14
Attending: SURGERY | Admitting: SURGERY
Payer: COMMERCIAL

## 2020-04-14 ENCOUNTER — ANESTHESIA (OUTPATIENT)
Dept: SURGERY | Facility: MEDICAL CENTER | Age: 65
End: 2020-04-14
Payer: COMMERCIAL

## 2020-04-14 VITALS
DIASTOLIC BLOOD PRESSURE: 79 MMHG | HEIGHT: 73 IN | TEMPERATURE: 97.4 F | BODY MASS INDEX: 27.67 KG/M2 | WEIGHT: 208.78 LBS | HEART RATE: 94 BPM | OXYGEN SATURATION: 95 % | RESPIRATION RATE: 16 BRPM | SYSTOLIC BLOOD PRESSURE: 106 MMHG

## 2020-04-14 DIAGNOSIS — G89.18 POSTOPERATIVE PAIN: ICD-10-CM

## 2020-04-14 PROBLEM — Z98.890 PONV (POSTOPERATIVE NAUSEA AND VOMITING): Chronic | Status: ACTIVE | Noted: 2020-04-14

## 2020-04-14 PROBLEM — R11.2 PONV (POSTOPERATIVE NAUSEA AND VOMITING): Chronic | Status: ACTIVE | Noted: 2020-04-14

## 2020-04-14 LAB
CHEMOTHERAPY INFUSION START DATE: NORMAL
CHEMOTHERAPY RECORDS: 1.8
CHEMOTHERAPY RECORDS: 4500
CHEMOTHERAPY RECORDS: NORMAL
CHEMOTHERAPY RX CANCER: NORMAL
DATE 1ST CHEMO CANCER: NORMAL
RAD ONC ARIA COURSE LAST TREATMENT DATE: NORMAL
RAD ONC ARIA COURSE TREATMENT ELAPSED DAYS: NORMAL
RAD ONC ARIA REFERENCE POINT DOSAGE GIVEN TO DATE: 21.6
RAD ONC ARIA REFERENCE POINT DOSAGE GIVEN TO DATE: 22.14
RAD ONC ARIA REFERENCE POINT ID: NORMAL
RAD ONC ARIA REFERENCE POINT ID: NORMAL
RAD ONC ARIA REFERENCE POINT SESSION DOSAGE GIVEN: 1.8
RAD ONC ARIA REFERENCE POINT SESSION DOSAGE GIVEN: 1.84

## 2020-04-14 PROCEDURE — A6402 STERILE GAUZE <= 16 SQ IN: HCPCS | Performed by: SURGERY

## 2020-04-14 PROCEDURE — 501570 HCHG TROCAR, SEPARATOR: Performed by: SURGERY

## 2020-04-14 PROCEDURE — 160046 HCHG PACU - 1ST 60 MINS PHASE II: Performed by: SURGERY

## 2020-04-14 PROCEDURE — 160208 HCHG ENDO MINUTES - EA ADDL 1 MIN LEVEL 4: Performed by: SURGERY

## 2020-04-14 PROCEDURE — 160047 HCHG PACU  - EA ADDL 30 MINS PHASE II: Performed by: SURGERY

## 2020-04-14 PROCEDURE — 160048 HCHG OR STATISTICAL LEVEL 1-5: Performed by: SURGERY

## 2020-04-14 PROCEDURE — 77386 HCHG IMRT DELIVERY COMPLEX: CPT | Performed by: RADIOLOGY

## 2020-04-14 PROCEDURE — 160035 HCHG PACU - 1ST 60 MINS PHASE I: Performed by: SURGERY

## 2020-04-14 PROCEDURE — 700101 HCHG RX REV CODE 250: Performed by: SURGERY

## 2020-04-14 PROCEDURE — 700105 HCHG RX REV CODE 258: Performed by: SURGERY

## 2020-04-14 PROCEDURE — 502571 HCHG PACK, LAP CHOLE: Performed by: SURGERY

## 2020-04-14 PROCEDURE — 160036 HCHG PACU - EA ADDL 30 MINS PHASE I: Performed by: SURGERY

## 2020-04-14 PROCEDURE — 700102 HCHG RX REV CODE 250 W/ 637 OVERRIDE(OP): Performed by: ANESTHESIOLOGY

## 2020-04-14 PROCEDURE — 160002 HCHG RECOVERY MINUTES (STAT): Performed by: SURGERY

## 2020-04-14 PROCEDURE — A9270 NON-COVERED ITEM OR SERVICE: HCPCS | Performed by: ANESTHESIOLOGY

## 2020-04-14 PROCEDURE — 501838 HCHG SUTURE GENERAL: Performed by: SURGERY

## 2020-04-14 PROCEDURE — 501583 HCHG TROCAR, THRD CAN&SEAL 5X100: Performed by: SURGERY

## 2020-04-14 PROCEDURE — 501571 HCHG TROCAR, SEPARATOR 12X100: Performed by: SURGERY

## 2020-04-14 PROCEDURE — 500868 HCHG NEEDLE, SURGI(VARES): Performed by: SURGERY

## 2020-04-14 PROCEDURE — 77014 PR CT GUIDANCE PLACEMENT RAD THERAPY FIELDS: CPT | Mod: 26 | Performed by: RADIOLOGY

## 2020-04-14 PROCEDURE — 700101 HCHG RX REV CODE 250: Performed by: ANESTHESIOLOGY

## 2020-04-14 PROCEDURE — 700111 HCHG RX REV CODE 636 W/ 250 OVERRIDE (IP): Performed by: ANESTHESIOLOGY

## 2020-04-14 PROCEDURE — 160025 RECOVERY II MINUTES (STATS): Performed by: SURGERY

## 2020-04-14 PROCEDURE — 160009 HCHG ANES TIME/MIN: Performed by: SURGERY

## 2020-04-14 PROCEDURE — 160203 HCHG ENDO MINUTES - 1ST 30 MINS LEVEL 4: Performed by: SURGERY

## 2020-04-14 RX ORDER — OXYCODONE HCL 5 MG/5 ML
5 SOLUTION, ORAL ORAL
Status: DISCONTINUED | OUTPATIENT
Start: 2020-04-14 | End: 2020-04-14 | Stop reason: HOSPADM

## 2020-04-14 RX ORDER — FUROSEMIDE 20 MG/1
20 TABLET ORAL DAILY
COMMUNITY
End: 2020-06-17

## 2020-04-14 RX ORDER — METOCLOPRAMIDE HYDROCHLORIDE 5 MG/ML
INJECTION INTRAMUSCULAR; INTRAVENOUS PRN
Status: DISCONTINUED | OUTPATIENT
Start: 2020-04-14 | End: 2020-04-14 | Stop reason: SURG

## 2020-04-14 RX ORDER — MEPERIDINE HYDROCHLORIDE 25 MG/ML
6.25 INJECTION INTRAMUSCULAR; INTRAVENOUS; SUBCUTANEOUS
Status: DISCONTINUED | OUTPATIENT
Start: 2020-04-14 | End: 2020-04-14 | Stop reason: HOSPADM

## 2020-04-14 RX ORDER — PHENYLEPHRINE HCL IN 0.9% NACL 0.5 MG/5ML
SYRINGE (ML) INTRAVENOUS PRN
Status: DISCONTINUED | OUTPATIENT
Start: 2020-04-14 | End: 2020-04-14 | Stop reason: SURG

## 2020-04-14 RX ORDER — LIDOCAINE HYDROCHLORIDE 20 MG/ML
INJECTION, SOLUTION EPIDURAL; INFILTRATION; INTRACAUDAL; PERINEURAL PRN
Status: DISCONTINUED | OUTPATIENT
Start: 2020-04-14 | End: 2020-04-14 | Stop reason: SURG

## 2020-04-14 RX ORDER — DEXAMETHASONE SODIUM PHOSPHATE 4 MG/ML
INJECTION, SOLUTION INTRA-ARTICULAR; INTRALESIONAL; INTRAMUSCULAR; INTRAVENOUS; SOFT TISSUE PRN
Status: DISCONTINUED | OUTPATIENT
Start: 2020-04-14 | End: 2020-04-14 | Stop reason: SURG

## 2020-04-14 RX ORDER — HALOPERIDOL 5 MG/ML
1 INJECTION INTRAMUSCULAR
Status: DISCONTINUED | OUTPATIENT
Start: 2020-04-14 | End: 2020-04-14 | Stop reason: HOSPADM

## 2020-04-14 RX ORDER — ONDANSETRON 2 MG/ML
INJECTION INTRAMUSCULAR; INTRAVENOUS PRN
Status: DISCONTINUED | OUTPATIENT
Start: 2020-04-14 | End: 2020-04-14 | Stop reason: SURG

## 2020-04-14 RX ORDER — LIDOCAINE HYDROCHLORIDE 40 MG/ML
SOLUTION TOPICAL PRN
Status: DISCONTINUED | OUTPATIENT
Start: 2020-04-14 | End: 2020-04-14 | Stop reason: SURG

## 2020-04-14 RX ORDER — SODIUM CHLORIDE, SODIUM LACTATE, POTASSIUM CHLORIDE, CALCIUM CHLORIDE 600; 310; 30; 20 MG/100ML; MG/100ML; MG/100ML; MG/100ML
INJECTION, SOLUTION INTRAVENOUS CONTINUOUS
Status: DISCONTINUED | OUTPATIENT
Start: 2020-04-14 | End: 2020-04-14 | Stop reason: HOSPADM

## 2020-04-14 RX ORDER — ONDANSETRON 2 MG/ML
4 INJECTION INTRAMUSCULAR; INTRAVENOUS
Status: DISCONTINUED | OUTPATIENT
Start: 2020-04-14 | End: 2020-04-14 | Stop reason: HOSPADM

## 2020-04-14 RX ORDER — EZETIMIBE 10 MG/1
10 TABLET ORAL DAILY
COMMUNITY

## 2020-04-14 RX ORDER — BUPIVACAINE HYDROCHLORIDE AND EPINEPHRINE 5; 5 MG/ML; UG/ML
INJECTION, SOLUTION EPIDURAL; INTRACAUDAL; PERINEURAL
Status: DISCONTINUED | OUTPATIENT
Start: 2020-04-14 | End: 2020-04-14 | Stop reason: HOSPADM

## 2020-04-14 RX ORDER — ROCURONIUM BROMIDE 10 MG/ML
INJECTION, SOLUTION INTRAVENOUS PRN
Status: DISCONTINUED | OUTPATIENT
Start: 2020-04-14 | End: 2020-04-14 | Stop reason: SURG

## 2020-04-14 RX ORDER — SUCCINYLCHOLINE/SOD CL,ISO/PF 200MG/10ML
SYRINGE (ML) INTRAVENOUS PRN
Status: DISCONTINUED | OUTPATIENT
Start: 2020-04-14 | End: 2020-04-14 | Stop reason: SURG

## 2020-04-14 RX ORDER — POTASSIUM CHLORIDE 750 MG/1
10 TABLET, EXTENDED RELEASE ORAL 2 TIMES DAILY
Status: ON HOLD | COMMUNITY
End: 2020-06-22

## 2020-04-14 RX ORDER — CEFAZOLIN SODIUM 1 G/3ML
INJECTION, POWDER, FOR SOLUTION INTRAMUSCULAR; INTRAVENOUS PRN
Status: DISCONTINUED | OUTPATIENT
Start: 2020-04-14 | End: 2020-04-14 | Stop reason: SURG

## 2020-04-14 RX ORDER — TEMAZEPAM 15 MG/1
15 CAPSULE ORAL
COMMUNITY
End: 2020-06-17

## 2020-04-14 RX ORDER — ACETAMINOPHEN 325 MG/1
650 TABLET ORAL EVERY 4 HOURS PRN
Status: DISCONTINUED | OUTPATIENT
Start: 2020-04-14 | End: 2020-04-14 | Stop reason: HOSPADM

## 2020-04-14 RX ORDER — TIOTROPIUM BROMIDE 18 UG/1
18 CAPSULE ORAL; RESPIRATORY (INHALATION) DAILY
COMMUNITY
End: 2020-07-20 | Stop reason: SDUPTHER

## 2020-04-14 RX ORDER — DOXAZOSIN MESYLATE 4 MG/1
4 TABLET ORAL DAILY
Status: ON HOLD | COMMUNITY
End: 2020-06-22

## 2020-04-14 RX ORDER — OXYCODONE HCL 5 MG/5 ML
10 SOLUTION, ORAL ORAL
Status: DISCONTINUED | OUTPATIENT
Start: 2020-04-14 | End: 2020-04-14 | Stop reason: HOSPADM

## 2020-04-14 RX ORDER — HYDROCODONE BITARTRATE AND ACETAMINOPHEN 2.5; 108 MG/5ML; MG/5ML
10-20 SOLUTION ORAL EVERY 6 HOURS PRN
Qty: 200 ML | Refills: 0 | Status: SHIPPED | OUTPATIENT
Start: 2020-04-14 | End: 2020-04-18

## 2020-04-14 RX ORDER — ATORVASTATIN CALCIUM 80 MG/1
80 TABLET, FILM COATED ORAL NIGHTLY
Status: ON HOLD | COMMUNITY
End: 2020-06-22

## 2020-04-14 RX ADMIN — LIDOCAINE HYDROCHLORIDE 100 MG: 20 INJECTION, SOLUTION EPIDURAL; INFILTRATION; INTRACAUDAL at 13:02

## 2020-04-14 RX ADMIN — ROCURONIUM BROMIDE 30 MG: 10 INJECTION, SOLUTION INTRAVENOUS at 13:10

## 2020-04-14 RX ADMIN — Medication 100 MCG: at 13:09

## 2020-04-14 RX ADMIN — MEPERIDINE HYDROCHLORIDE 6.25 MG: 25 INJECTION INTRAMUSCULAR; INTRAVENOUS; SUBCUTANEOUS at 14:24

## 2020-04-14 RX ADMIN — ONDANSETRON 4 MG: 2 INJECTION INTRAMUSCULAR; INTRAVENOUS at 13:28

## 2020-04-14 RX ADMIN — SUGAMMADEX 200 MG: 100 INJECTION, SOLUTION INTRAVENOUS at 13:33

## 2020-04-14 RX ADMIN — CEFAZOLIN 2 G: 330 INJECTION, POWDER, FOR SOLUTION INTRAMUSCULAR; INTRAVENOUS at 13:09

## 2020-04-14 RX ADMIN — METOCLOPRAMIDE 10 MG: 5 INJECTION, SOLUTION INTRAMUSCULAR; INTRAVENOUS at 13:12

## 2020-04-14 RX ADMIN — DEXAMETHASONE SODIUM PHOSPHATE 8 MG: 4 INJECTION, SOLUTION INTRA-ARTICULAR; INTRALESIONAL; INTRAMUSCULAR; INTRAVENOUS; SOFT TISSUE at 13:07

## 2020-04-14 RX ADMIN — LIDOCAINE HYDROCHLORIDE 0.5 ML: 10 INJECTION, SOLUTION EPIDURAL; INFILTRATION; INTRACAUDAL; PERINEURAL at 12:15

## 2020-04-14 RX ADMIN — PROPOFOL 20 MG: 10 INJECTION, EMULSION INTRAVENOUS at 13:36

## 2020-04-14 RX ADMIN — MEPERIDINE HYDROCHLORIDE 6.25 MG: 25 INJECTION INTRAMUSCULAR; INTRAVENOUS; SUBCUTANEOUS at 14:19

## 2020-04-14 RX ADMIN — MEPERIDINE HYDROCHLORIDE 6.25 MG: 25 INJECTION INTRAMUSCULAR; INTRAVENOUS; SUBCUTANEOUS at 14:42

## 2020-04-14 RX ADMIN — LIDOCAINE HYDROCHLORIDE 4 ML: 40 SOLUTION TOPICAL at 13:03

## 2020-04-14 RX ADMIN — PROPOFOL 200 MG: 10 INJECTION, EMULSION INTRAVENOUS at 13:02

## 2020-04-14 RX ADMIN — FENTANYL CITRATE 50 MCG: 50 INJECTION, SOLUTION INTRAMUSCULAR; INTRAVENOUS at 12:58

## 2020-04-14 RX ADMIN — SODIUM CHLORIDE, POTASSIUM CHLORIDE, SODIUM LACTATE AND CALCIUM CHLORIDE: 600; 310; 30; 20 INJECTION, SOLUTION INTRAVENOUS at 12:15

## 2020-04-14 RX ADMIN — Medication 120 MG: at 13:02

## 2020-04-14 RX ADMIN — FENTANYL CITRATE 50 MCG: 50 INJECTION, SOLUTION INTRAMUSCULAR; INTRAVENOUS at 13:36

## 2020-04-14 RX ADMIN — SODIUM CHLORIDE, POTASSIUM CHLORIDE, SODIUM LACTATE AND CALCIUM CHLORIDE: 600; 310; 30; 20 INJECTION, SOLUTION INTRAVENOUS at 13:36

## 2020-04-14 RX ADMIN — FENTANYL CITRATE 50 MCG: 50 INJECTION, SOLUTION INTRAMUSCULAR; INTRAVENOUS at 13:17

## 2020-04-14 RX ADMIN — Medication 100 MCG: at 13:02

## 2020-04-14 ASSESSMENT — FIBROSIS 4 INDEX: FIB4 SCORE: 1.39

## 2020-04-14 NOTE — OR NURSING
Patient shivering; medicated with Demerol with relief  Taking ice chips; expectorating frothy sputum.  States this is normal for his condition  Denies nausea

## 2020-04-14 NOTE — OR NURSING
Pt arrived in Phase 2 at 1520, dressed, transferred to recliner chair, VSS, denies pain, nausea or SOB, daughter and personal belongings at bedside, abdominal J tube drain gauze DSG CDI, abdominal gauze DSG x 2 CDI, pt sipping on popscicle.

## 2020-04-14 NOTE — OR NURSING
Daughter, Robert, notified of patient's status  Patient complained of shaking; remedicated with Demerol

## 2020-04-14 NOTE — OR NURSING
JOSEPH SEGURA at bedside; talked with patient.  Stated the patient may use the tube, for water, tonight and start liquids tomorrow.

## 2020-04-14 NOTE — OP REPORT
DATE OF SERVICE:  04/14/2020    PREOPERATIVE DIAGNOSIS:  Esophageal cancer with dysphagia.    POSTOPERATIVE DIAGNOSIS:  Esophageal cancer with dysphagia.    PROCEDURE:  Laparoscopic-assisted jejunostomy tube placement.    SURGEON:  John H. Ganser, MD    ASSISTANT:  Carlos Colorado PA-C    ANESTHESIA:  General.    ANESTHESIOLOGIST:  Janett Cota MD    INDICATIONS:  The patient is a 64-year-old male who developed dysphagia and   was found to have distal esophageal cancer.  He is undergoing chemotherapy and   radiation and has developed severe dysphagia and having difficulty even   keeping his own secretions down.  Risks, benefits, and alternatives to   jejunostomy tube placement were outlined in detail.  Questions answered and he   wished to proceed.    FINDINGS:  On laparoscopic exploration, there was no visible tumor outside of   the esophagus.  There was a moderate-sized hiatal hernia.    PROCEDURE:  The patient was identified and general anesthetic administered.    His abdomen was prepped and draped in the usual sterile fashion.  Local   anesthesia of 0.5% Marcaine with epinephrine was injected prior to making skin   incision.  Small incision was made to left midline in the midepigastric   region.  The Veress needle passed.  The abdomen was insufflated with carbon   dioxide without incident and a 5 mm blunt trocar and 5-mm 30-degree scope   inserted.  Left mid abdominal 5 mm trocar was placed and tunneled under the   subQ prior to entering the abdominal cavity at the proposed tube exit site.    The incision was then made in the left upper quadrant and a 12 mm trocar   placed.  Exploration was carried out and above findings noted.  The small   bowel was then identified at the ligament of Treitz measured approximately 30   cm beyond that point where it was grasped through the proposed tube exit site.    The abdomen was then deflated and the other trocars withdrawn.  The 12 mm   trocar site was enlarged  slightly.  The fascia divided and the bowel brought   up through this incision.  A pursestring of 3-0 silk was placed on the   antimesenteric border.  Enterotomy was made.  The 14-[place] Island[place] BENJAMIN jejunostomy   tube was then passed and the pursestring tied.  Floyd tunnel was created with   interrupted 3-0 silk sutures, suturing the plastic tab on the tube to the   bowel with the final suture.  Bowel was then returned to the abdominal cavity.    The tunneling spike was then advanced from the incision through the 5 mm   trocar.  The tube attached and the tube pulled into position.  The cup was   seated just under the skin and it was secured to skin with a 0 silk.    The accessory incision was closed at the fascial level in 2 layers with   running 0 Vicryl.  Further local anesthesia was injected.  The incisions were   closed with 4-0 Vicryl subcuticular sutures.  Sterile dressings were applied.    The patient returned to recovery room in stable condition.       ____________________________________     JOHN H. GANSER, MD [facility] / JUANCARLOS    DD:  04/14/2020 13:40:40  DT:  04/14/2020 14:46:58    D#:  [id number]  Job#:  [id number]    cc: Abelardo Arias MD, Jerry Bates MD

## 2020-04-14 NOTE — ANESTHESIA PREPROCEDURE EVALUATION
Relevant Problems   ANESTHESIA   (+) PONV (postoperative nausea and vomiting)      PULMONARY   (+) COPD (chronic obstructive pulmonary disease) (HCC)      CARDIAC   (+) Coronary artery disease due to calcified coronary lesion   (+) Coronary artery disease involving native coronary artery of native heart without angina pectoris   (+) Essential hypertension, benign   (+) STEMI (ST elevation myocardial infarction) (HCC)      ENDO   (+) Hypothyroid      Other   (+) Cardiomyopathy, ischemic   (+) Central sleep apnea   (+) Cervical disc disease   (+) Diastolic heart failure (HCC)   (+) Malignant neoplasm of lower third of esophagus (HCC)   (+) Systolic heart failure secondary to coronary artery disease (HCC)       Physical Exam    Airway   Mallampati: I  TM distance: >3 FB  Neck ROM: full       Cardiovascular - normal exam  Rhythm: regular  Rate: normal  (-) murmur     Dental - normal exam         Pulmonary - normal exam  Breath sounds clear to auscultation     Abdominal    Neurological - normal exam               Anesthesia Plan    ASA 3   ASA physical status 3 criteria: CAD/stents (> 3 months)    Plan - general       Airway plan will be ETT        Induction: intravenous    Postoperative Plan: Postoperative administration of opioids is intended.    Pertinent diagnostic labs and testing reviewed    Informed Consent:    Anesthetic plan and risks discussed with patient.

## 2020-04-14 NOTE — ANESTHESIA QCDR
2019 [facility] Clinical Data Registry (for Quality Improvement)     Postoperative nausea/vomiting risk protocol (Adult = 18 yrs and Pediatric 3-17 yrs)- (430 and 463)  General inhalation anesthetic (NOT TIVA) with PONV risk factors: Yes  Provision of anti-emetic therapy with at least 2 different classes of agents: Yes   Patient DID NOT receive anti-emetic therapy and reason is documented in Medical Record:  N/A    Multimodal Pain Management- (477)  Non-emergent surgery AND patient age >= 18: No  Use of Multimodal Pain Management, two or more drugs and/or interventions, NOT including systemic opioids:   Exception: Documented allergy to multiple classes of analgesics:     Smoking Abstinence (404)  Patient is current smoker (cigarette, pipe, e-cig, marijuanna): No  Elective Surgery:   Abstinence instructions provided prior to day of surgery:   Patient abstained from smoking on day of surgery:     Pre-Op Beta-Blocker in Isolated CABG (44)  Isolated CABG AND patient age >= 18: No  Beta-blocker admin within 24 hours of surgical incision:   Exception:of medical reason(s) for not administering beta blocker within 24 hours prior to surgical incision (e.g., not  indicated,other medical reason):     PACU assessment of acute postoperative pain prior to Anesthesia Care End- Applies to Patients Age = 18- (ABG7)  Initial PACU pain score is which of the following: < 7/10  Patient unable to report pain score: N/A    Post-anesthetic transfer of care checklist/protocol to PACU/ICU- (426 and 427)  Upon conclusion of case, patient transferred to which of the following locations: PACU/Non-ICU  Use of transfer checklist/protocol: Yes  Exclusion: Service Performed in Patient Hospital Room (and thus did not require transfer): N/A  Unplanned admission to ICU related to anesthesia service up through end of PACU care- (MD51)  Unplanned admission to ICU (not initially anticipated at anesthesia start time): No

## 2020-04-14 NOTE — ANESTHESIA TIME REPORT
Anesthesia Start and Stop Event Times     Date Time Event    4/14/2020 1239 Ready for Procedure     1256 Anesthesia Start     1345 Anesthesia Stop        Responsible Staff  04/14/20    Name Role Begin End    Janett Cota M.D. Anesth 1256 1345        Preop Diagnosis (Free Text):  Pre-op Diagnosis     DISTAL ESOPHAGEAL CANCER WITH WEIGHT LOSS        Preop Diagnosis (Codes):    Post op Diagnosis  Esophageal cancer (HCC)      Premium Reason  Non-Premium    Comments:

## 2020-04-14 NOTE — OR NURSING
Able to swallow without difficulty; states the tumor is agt the bottom of the esophagus and he regurgitates the fluid.  Spits into a emesis bag, clear frothy sputum.  Otter pop given; tolerated well  No further shivering per patient  Transferred to Phase II via [facility]   Patient on room air

## 2020-04-14 NOTE — ANESTHESIA POSTPROCEDURE EVALUATION
Patient: Norberto Shultzobesaskia    Procedure Summary     Date:  04/14/20 Room / Location:  [facility] OR 11 / SURGERY [facility]    Anesthesia Start:  1256 Anesthesia Stop:      Procedure:  CREATION, GASTROSTOMY, LAPAROSCOPIC-JEJUNOSTOMY PLACEMENT Diagnosis:  (DISTAL ESOPHAGEAL CANCER WITH WEIGHT LOSS)    Surgeon:  John H Ganser, M.D. Responsible Provider:  Janett Cota M.D.    Anesthesia Type:  general ASA Status:  3          Final Anesthesia Type: general  Last vitals  BP   Blood Pressure: 128/75    Temp   36.3 °C (97.4 °F)    Pulse   Pulse: 72   Resp   20    SpO2   97 %      Anesthesia Post Evaluation    Patient location during evaluation: PACU  Patient participation: complete - patient participated  Level of consciousness: awake and alert    Airway patency: patent  Anesthetic complications: no  Cardiovascular status: hemodynamically stable  Respiratory status: acceptable  Hydration status: euvolemic    PONV: none           Nurse Pain Score: 0 (NPRS)

## 2020-04-14 NOTE — ANESTHESIA PROCEDURE NOTES
Airway  Date/Time: 4/14/2020 1:03 PM  Performed by: Janett Cota M.D.  Authorized by: Janett Cota M.D.     Location:  OR  Urgency:  Elective  Difficult Airway: No    Indications for Airway Management:  Anesthesia      Spontaneous Ventilation: absent    Sedation Level:  Deep  Preoxygenated: Yes    Patient Position:  Sniffing  Mask Difficulty Assessment:  0 - not attempted  Final Airway Type:  Endotracheal airway  Final Endotracheal Airway:  ETT  Cuffed: Yes    Technique Used for Successful ETT Placement:  Direct laryngoscopy  Devices/Methods Used in Placement:  Intubating stylet and cricoid pressure  Insertion Site:  Oral  Blade Type:  Radha  Laryngoscope Blade/Videolaryngoscope Blade Size:  3  ETT Size (mm):  8.0  Measured from:  Teeth  ETT to Teeth (cm):  24  Placement Verified by: auscultation and capnometry    Cormack-Lehane Classification:  Grade IIb - view of arytenoids or posterior of glottis only  Number of Attempts at Approach:  1

## 2020-04-14 NOTE — DISCHARGE INSTR - OTHER INFO
Discharge home when alert, comfortable, ambulatory, and tolerating clear liquids.  Please ensure patient gets J-tube cap and connector (in plastic bag on chart).  Pt counseled re: diet, activity, home med's, and wound care.  Liquids as tolerated.  Begin J-tube feeds at home in AM POD#1  Keep J-tube site clean and dry until 4/18/20. On that day ok to remove Tegaderms and drain sponge around J-tube and shower. No baths/soaks  No driving for 4-5 days.  No lifting >15 lbs for 2-3 weeks.  F/U with Dr. Ganser in 1-2 weeks

## 2020-04-14 NOTE — DISCHARGE INSTRUCTIONS
ACTIVITY: Rest and take it easy for the first 24 hours.  A responsible adult is recommended to remain with you during that time.  It is normal to feel sleepy.  We encourage you to not do anything that requires balance, judgment or coordination.    MILD FLU-LIKE SYMPTOMS ARE NORMAL. YOU MAY EXPERIENCE GENERALIZED MUSCLE ACHES, THROAT IRRITATION, HEADACHE AND/OR SOME NAUSEA.    FOR 24 HOURS DO NOT:  Drive, operate machinery or run household appliances.  Drink beer or alcoholic beverages.   Make important decisions or sign legal documents.    SPECIAL INSTRUCTIONS:    Please ensure patient gets J-tube cap and connector (in plastic bag on chart).  Pt counseled re: diet, activity, home meds, and wound care.  Liquids as tolerated.  Begin J-tube feeds at home in AM POD#1 (4/15/20)  Keep J-tube site clean and dry until 4/18/20. On that day ok to remove Tegaderms and drain sponge around J-tube and shower. No baths/soaks  No driving for 4-5 days.  No lifting >15 lbs for 2-3 weeks.  Follow up with Dr. Ganser in 1-2 weeks    DIET: To avoid nausea, slowly advance diet as tolerated, avoiding spicy or greasy foods for the first day.  Add more substantial food to your diet according to your physician's instructions.  Babies can be fed formula or breast milk as soon as they are hungry.  INCREASE FLUIDS AND FIBER TO AVOID CONSTIPATION.    SURGICAL DRESSING/BATHING: Keep J-tube site clean and dry until 4/18/20. On that day ok to remove Tegaderms and drain sponge around J-tube and shower. No baths/soaks    FOLLOW-UP APPOINTMENT:  A follow-up appointment should be arranged with your doctor in 1-2 weeks; call to schedule.    You should CALL YOUR PHYSICIAN if you develop:  Fever greater than 101 degrees F.  Pain not relieved by medication, or persistent nausea or vomiting.  Excessive bleeding (blood soaking through dressing) or unexpected drainage from the wound.  Extreme redness or swelling around the incision site, drainage of pus or  foul smelling drainage.  Inability to urinate or empty your bladder within 8 hours.  Problems with breathing or chest pain.    You should call [phone] if you develop problems with breathing or chest pain.  If you are unable to contact your doctor or surgical center, you should go to the nearest emergency room or urgent care center.  Physician's telephone #: Dr. Ganser [phone]    If any questions arise, call your doctor.  If your doctor is not available, please feel free to call the Surgical Center at [phone].  The Center is open Monday through Friday from 7AM to 7PM.  You can also call the [employer] HOTLINE open 24 hours/day, 7 days/week and speak to a nurse at [phone], or toll free at [phone].    A registered nurse may call you a few days after your surgery to see how you are doing after your procedure.    MEDICATIONS: Resume taking daily medication.  Take prescribed pain medication with food.  If no medication is prescribed, you may take non-aspirin pain medication if needed.  PAIN MEDICATION CAN BE VERY CONSTIPATING.  Take a stool softener or laxative such as senokot, pericolace, or milk of magnesia if needed.    Prescription given for Hydrocodone-Acetaminophen.  Last pain medication given at N/A.    If your physician has prescribed pain medication that includes Acetaminophen (Tylenol), do not take additional Acetaminophen (Tylenol) while taking the prescribed medication.    Depression / Suicide Risk    As you are discharged from this [facility] facility, it is important to learn how to keep safe from harming yourself.    Recognize the warning signs:  · Abrupt changes in personality, positive or negative- including increase in energy   · Giving away possessions  · Change in eating patterns- significant weight changes-  positive or negative  · Change in sleeping patterns- unable to sleep or sleeping all the time   · Unwillingness or inability to communicate  · Depression  · Unusual sadness,  discouragement and loneliness  · Talk of wanting to die  · Neglect of personal appearance   · Rebelliousness- reckless behavior  · Withdrawal from people/activities they love  · Confusion- inability to concentrate     If you or a loved one observes any of these behaviors or has concerns about self-harm, here's what you can do:  · Talk about it- your feelings and reasons for harming yourself  · Remove any means that you might use to hurt yourself (examples: pills, rope, extension cords, firearm)  · Get professional help from the community (Mental Health, Substance Abuse, psychological counseling)  · Do not be alone:Call your Safe Contact- someone whom you trust who will be there for you.  · Call your local CRISIS HOTLINE 049-5101 or 968-043-0967  · Call your local Children's Mobile Crisis Response Team [place] (401) 791-3364 or www.[employer]  · Call the toll free National Suicide Prevention Hotlines   · National Suicide Prevention Lifeline 705-595-QUBP (1102)  · National Hope Line Network 800-SUICIDE (075-9710)

## 2020-04-14 NOTE — PROGRESS NOTES
Med rec updated and complete  Allergies reviewed  Interviewed pt with wife at bedside with permission from pt.  Pt reports that he has not been able to keep any of his medications down for over a week.  Pt reports no antibiotics in the last 2 weeks

## 2020-04-15 ENCOUNTER — HOSPITAL ENCOUNTER (OUTPATIENT)
Dept: RADIATION ONCOLOGY | Facility: MEDICAL CENTER | Age: 65
End: 2020-04-15
Payer: COMMERCIAL

## 2020-04-15 ENCOUNTER — DOCUMENTATION (OUTPATIENT)
Dept: NUTRITION | Facility: MEDICAL CENTER | Age: 65
End: 2020-04-15

## 2020-04-15 ENCOUNTER — PATIENT OUTREACH (OUTPATIENT)
Dept: OTHER | Facility: MEDICAL CENTER | Age: 65
End: 2020-04-15

## 2020-04-15 VITALS
SYSTOLIC BLOOD PRESSURE: 120 MMHG | WEIGHT: 211.86 LBS | DIASTOLIC BLOOD PRESSURE: 79 MMHG | TEMPERATURE: 98.3 F | BODY MASS INDEX: 27.95 KG/M2 | OXYGEN SATURATION: 93 % | HEART RATE: 96 BPM

## 2020-04-15 LAB
CHEMOTHERAPY INFUSION START DATE: NORMAL
CHEMOTHERAPY RECORDS: 1.8
CHEMOTHERAPY RECORDS: 4500
CHEMOTHERAPY RECORDS: NORMAL
CHEMOTHERAPY RX CANCER: NORMAL
DATE 1ST CHEMO CANCER: NORMAL
RAD ONC ARIA COURSE LAST TREATMENT DATE: NORMAL
RAD ONC ARIA COURSE TREATMENT ELAPSED DAYS: NORMAL
RAD ONC ARIA REFERENCE POINT DOSAGE GIVEN TO DATE: 23.4
RAD ONC ARIA REFERENCE POINT DOSAGE GIVEN TO DATE: 23.98
RAD ONC ARIA REFERENCE POINT ID: NORMAL
RAD ONC ARIA REFERENCE POINT ID: NORMAL
RAD ONC ARIA REFERENCE POINT SESSION DOSAGE GIVEN: 1.8
RAD ONC ARIA REFERENCE POINT SESSION DOSAGE GIVEN: 1.84

## 2020-04-15 PROCEDURE — 77336 RADIATION PHYSICS CONSULT: CPT | Performed by: RADIOLOGY

## 2020-04-15 PROCEDURE — 77386 HCHG IMRT DELIVERY COMPLEX: CPT | Performed by: RADIOLOGY

## 2020-04-15 PROCEDURE — 77014 PR CT GUIDANCE PLACEMENT RAD THERAPY FIELDS: CPT | Mod: 26 | Performed by: RADIOLOGY

## 2020-04-15 ASSESSMENT — FIBROSIS 4 INDEX: FIB4 SCORE: 1.39

## 2020-04-15 ASSESSMENT — PAIN SCALES - GENERAL: PAINLEVEL: 2=MINIMAL-SLIGHT

## 2020-04-15 NOTE — OR NURSING
Discharge instructions and RX reviewed wit pt and daughter, two elicia valves given to pt, have demonstrated use of louisa valve and syringe for administration of water, tube feedings and crushed medications, have advised pt and daughter to contact their MD and pharmacist regarding his home medications and proper administration of his medications through his J tube. PIV removed, tip intact, discharged via wheelchair to home with daughter at 1645.

## 2020-04-15 NOTE — PROGRESS NOTES
Call from, Genna, who reported patient was frustrated regarding not getting what he had needed from company set up to help with feeding tube.  She reported was doing d/c follow up call and wanted to update navigation with information.  Let her know will pass on to Kristy, patient's cancer nurse navigator.    Review of chart indicated Kristen [place] for Cancer dietician addressed concerns today and had provided education and followed up with [facility] regarding patients concerns and needs.  She also plans to provide additional education and information tomorrow when patient in radiation.  Call placed to Kristy cancer nurse navigator and updated on information.  Patient being followed and assisted with current concerns.

## 2020-04-15 NOTE — ON TREATMENT VISIT
"  ON TREATMENT  NOTE  RADIATION ONCOLOGY DEPARTMENT    Patient name:  Norberto White    Primary Physician:  Alhaji Fenton M.D. MRN: [id number]  CSN: [id number]   Referring physician:  Jerry Bates M.D. : 1955, 64 y.o.     ENCOUNTER DATE:  04/15/20    DIAGNOSIS:  Malignant neoplasm of lower third of esophagus (HCC)  Staging form: Esophagus - Adenocarcinoma, AJCC 8th Edition  - Clinical: Stage III (cT3, cN0, cM0, G3) - Signed by Abelardo Arias M.D. on 3/16/2020  Total positive nodes: 0  Histologic grading system: 3 grade system      TREATMENT SUMMARY:  [facility]ria Treatment Information        Some values may be hidden. Unless noted otherwise, only the newest values recorded on each date are displayed.         [facility]ria Treatment Summary 4/15/20   Course First Treatment Date 2020   Course Last Treatment Date 04/15/2020   Esophagus Plan from Course C1_Esophagus   Fraction 13 of 25   Elapsed Course Days 16 @    Prescribed Fraction Dose 180 cGy   Prescribed Total Dose 4,500 cGy   Esophagus cp Reference Point from Course C1_Esophagus   Elapsed Course Days    Session Dose 184 cGy   Total Dose 2,398 cGy   PTV45 Reference Point from Course C1_Esophagus   Elapsed Course Days  @    Session Dose 180 cGy   Total Dose 2,340 cGy             SUBJECTIVE:  Well appearing      VITAL SIGNS:  KPS: 100, Fully active, able to carry on all pre-disease performed without restriction (ECOG equivalent 0)  Encounter Vitals  Temperature: 36.8 °C (98.3 °F)  Blood Pressure: 120/79  Pulse: 96  Pulse Oximetry: 93 %  Weight: 96.1 kg (211 lb 13.8 oz)  Pain Score: 2=Minimal-Slight  Pain Assessment 4/15/2020 2020   Pain Assessment - Acute Pain   Pain Score 2 1   Pain Loc Abdomen -   What increases pain? - pt has intermittent pain in stomach especially after eating   What decreases pain? - pt states\" I'm not a pill dileep\"   Some recent data might be hidden          PHYSICAL EXAM:    Physical " Exam  Constitutional:       Appearance: Normal appearance.   Abdominal:      General: There is no distension.      Palpations: Abdomen is soft.   Skin:     Findings: No erythema.   Neurological:      Mental Status: He is alert.          Toxicity Assessment 4/8/2020 4/1/2020   Toxicity Assessment Abdomen Abdomen   Fatigue (lethargy, malaise, asthenia) Increased fatigue over baseline, but not altering normal activities None   Radiation Dermatitis None None   Anorexia None Loss of appetite   Dehydration None None   Diarrhea w/o Colostomy None None   Nausea None None   Vomiting None None   Dyspepsia/heartburn None None   Dysphagia, Esophagitis, odynophagoa (painful swallowing) Mild dysphagia, but can eat regular diet Mild dysphagia, but can eat regular diet   Gastritis None None   Abdominal Pain or cramping Mild pain not interfering with function None       CURRENT MEDICATIONS:    Current Outpatient Medications:   •  atorvastatin (LIPITOR) 80 MG tablet, Take 80 mg by mouth every evening., Disp: , Rfl:   •  doxazosin (CARDURA) 4 MG Tab, Take 4 mg by mouth every day., Disp: , Rfl:   •  ezetimibe (ZETIA) 10 MG Tab, Take 10 mg by mouth every day., Disp: , Rfl:   •  fluticasone-salmeterol (ADVAIR) 500-50 MCG/DOSE AEROSOL POWDER, BREATH ACTIVATED, Inhale 1 Puff by mouth every 12 hours., Disp: , Rfl:   •  furosemide (LASIX) 20 MG Tab, Take 20 mg by mouth every day., Disp: , Rfl:   •  potassium chloride SA (K-DUR) 10 MEQ Tab CR, Take 10 mEq by mouth 2 times a day., Disp: , Rfl:   •  tiotropium (SPIRIVA) 18 MCG Cap, Inhale 18 mcg by mouth every day., Disp: , Rfl:   •  temazepam (RESTORIL) 15 MG Cap, Take 15 mg by mouth every bedtime., Disp: , Rfl:   •  Hydrocodone-Acetaminophen 2.5-108 mg/5 mL (HYCET) 2.5-108 MG/5ML Solution solution, Take 10-20 mL by mouth every 6 hours as needed (pain) for up to 4 days. Ok to give per J-tube if unable to tolerate PO, Disp: 200 mL, Rfl: 0  •  ondansetron (ZOFRAN ODT) 8 MG TABLET DISPERSIBLE,  Take 8 mg by mouth every 12 hours as needed for Nausea., Disp: , Rfl:   •  prochlorperazine (COMPAZINE) 10 MG Tab, Take 10 mg by mouth every 8 hours as needed for Nausea/Vomiting., Disp: , Rfl:   •  BRILINTA 90 MG Tab tablet, Take 90 mg by mouth 2 Times a Day., Disp: , Rfl:   •  metoprolol SR (TOPROL XL) 25 MG TABLET SR 24 HR, Take 25 mg by mouth every day., Disp: , Rfl:   •  benazepril (LOTENSIN) 20 MG Tab, Take 1 Tab by mouth every day., Disp: 90 Tab, Rfl: 3  •  nitroglycerin (NITROSTAT) 0.4 MG SL Tab, Place 1 Tab under tongue as needed for Chest Pain (Place 1 tablet under the tongue every 5 minutes up to 3 doses as needed for chest pain)., Disp: 75 Tab, Rfl: 3  •  omeprazole (PRILOSEC) 40 MG delayed-release capsule, Take 40 mg by mouth every day., Disp: , Rfl:   •  levothyroxine (SYNTHROID) 75 MCG Tab, Take 75 mcg by mouth Every morning on an empty stomach., Disp: , Rfl:   •  Melatonin 10 MG Tab, Take 10 mg by mouth every bedtime., Disp: , Rfl:   •  Cranberry 500 MG Cap, Take 1 Cap by mouth every day., Disp: , Rfl:   •  Zinc 50 MG Cap, Take 50 mg by mouth every day., Disp: , Rfl:   •  diphenhydrAMINE (BENADRYL) 25 MG Tab, Take 25 mg by mouth every bedtime., Disp: , Rfl:   •  aspirin EC (ECOTRIN) 81 MG TBEC, Take 81 mg by mouth every day., Disp: , Rfl:   •  tamsulosin (FLOMAX) 0.4 MG capsule, Take 0.4 mg by mouth ONE-HALF HOUR AFTER BREAKFAST., Disp: , Rfl:     LABORATORY DATA:   Lab Results   Component Value Date/Time    SODIUM 136 04/07/2020 11:40 AM    POTASSIUM 5.0 04/07/2020 11:40 AM    CHLORIDE 101 04/07/2020 11:40 AM    CO2 20 04/07/2020 11:40 AM    GLUCOSE 111 (H) 04/07/2020 11:40 AM    BUN 23 (H) 04/07/2020 11:40 AM    CREATININE 1.03 04/07/2020 11:40 AM         Lab Results   Component Value Date/Time    WBC 7.2 03/06/2020 12:10 PM    HEMOGLOBIN 14.7 03/06/2020 12:10 PM    HEMATOCRIT 43.4 03/06/2020 12:10 PM    MCV 90.2 03/06/2020 12:10 PM    PLATELETCT 166 03/06/2020 12:10 PM        RADIOLOGY  DATA:  Yq-[id number] Base To Mid-thigh    Result Date: 3/23/2020  3/23/2020 8:15 AM HISTORY/REASON FOR EXAM:  Adenocarcinoma of the distal esophagus. T3 disease by endoscopic ultrasound TECHNIQUE/EXAM DESCRIPTION AND NUMBER OF VIEWS: PET body imaging. Initially, 15.89 mCi F-18 FDG was administered intravenously under standardized conditions. Approximately 45 minutes after FDG administration, the patient was placed in the supine position on the PET CT table. Blood glucose level was 103 mg/dL. Low dose spiral CT imaging was performed from the skull base to the mid thighs. PET imaging was then performed from the skull base to the mid thighs. CT images, PET images, and PET/CT fused images were reviewed on a PACS 3D workstation. The limited non-contrast CT data are used primarily for attenuation correction and anatomic correlation.  Evaluation of solid organs and bowel are especially limited utilizing this technique. COMPARISON: None. FINDINGS: Head and neck: Uptake within the oropharynx is fairly symmetric and is likely physiologic. Chest: There is an air-fluid level in the dilated esophagus. Focal activity at the gastroesophageal junction correlates with wall thickening and extends into the gastric cardia. Maximum SUV is approximately 9.6. Activity extends superior to inferior for approximately 3.4 cm. Abdomen and pelvis: No abnormal focal FDG activity. Musculoskeletal: Uptake within the cervical musculature is more pronounced on the left, likely physiologic. Incidental findings on CT: There is chronic mucosal thickening in the right maxillary sinus. Scattered arterial calcifications are present. Postoperative changes are present from prior CABG. Coronary arteries are densely calcified. The heart is enlarged.  A hypodense lesion in the left hepatic lobe is consistent with a cyst. Calculi layer in the gallbladder. There is residual barium within the colon. There are scattered colonic diverticula. Degenerative changes  are in the spine. Prior cervical fusion is noted.     1.  FDG activity with associated wall thickening at the gastroesophageal junction extending to the gastric cardia is consistent with known malignancy. Mildly dilated fluid-filled esophagus. 2.  No metastatic disease identified.      IMPRESSION:  Malignant neoplasm of lower third of esophagus (HCC)  Staging form: Esophagus - Adenocarcinoma, AJCC 8th Edition  - Clinical: Stage III (cT3, cN0, cM0, G3) - Signed by Abelardo Arias M.D. on 3/16/2020  Total positive nodes: 0  Histologic grading system: 3 grade system      PLAN:  No change in treatment plan    Disposition:  Treatment plan reviewed. Questions answered. Continue therapy outlined.     Abelardo Arias M.D.    No orders of the defined types were placed in this encounter.

## 2020-04-15 NOTE — OR NURSING
Just spoke with Norberto at 245 pm.  Stated staff at [facility] were wonderful but he is very upset with tube feeding company.  They never showed up today and he hasn't been able to eat for 8 days.  Left message with Dr Ganser's nurse Juani to follow up.

## 2020-04-15 NOTE — PROGRESS NOTES
Nutrition Services: Brief Update  Weight: 211 lbs, weighed today during OTV  Weight History:  221 lbs on 4/08/20  227 lbs, weighed on 4/01/20    Weight Change: Pt wt down 10 lbs within past 1 week, which is a severe 4.5% decrease.     RD able to visit pt during OTV. Pt very frustrated.  was supposed to have pump and supplies delivered today and has not received anything. Mentions daughter received some limited education about how to use pump and it was inadequate. States daughter received only 10 minutes of education. Pt  does have J-tube attachment and louisa valve at home and has been able to flush tube with water 3x thus far and denies any issues with this. Pt continues to repeat that has not eaten within past 5-7 days and wants to discontinue services with [facility] given this experience.     Plan/Recommend:  • RD able to visualize tube site. Tube site remains intact with gauze covering. Appears appropriate at this time.   • RD reviewed flushing with 60 mL of water at a time. Given pt likely dehydrated, RD instructed pt to administer 60 mL of water via syringe slowly every 30 minutes to 1 hour. Instructed slow pace at 1 full syringe administered across a minimum 10 minute time period.   • RD spoke with [facility] on Monday and received confirmation that pt was going to receive supplies and education on Tuesday 4/14/20. RD validated pt frustration. RD called [facility], who states pt will likely receive pump and supplies between 5pm-7pm tonight. RD relayed pt frustration and urgency of situation, given pt extended period without adequate nutrients. RD requested member from [employer] reach out to pt for courtesy call to further inform pt of delivery plan.  • RD called [facility] Ricik VILLA. This RD requested additional pump education for pt as 10 minutes is inadequate. Ricki in agreement and states will follow-up wit pt.   • RD called pt and informed current plan. Pt  will be receiving  [facility] tomorrow and will likely receive IV hydration as well. RD instructed pt to bring pump and supplies to Radiation appointment tomorrow and RD will provide further pump education to pt and daughter. RD will provide and attempt to administer some formula via gravity bag in chance that pt does not have pump. RD communicated with  RD for support and may be able to obtain pump for loan if needed.  • RD concerned for potential refeeding given inadequate intake 5-7 days per pt report. RD to follow-up tomorrow.     RD to follow-up tomorrow.   Please contact -[phone]

## 2020-04-16 ENCOUNTER — DOCUMENTATION (OUTPATIENT)
Dept: NUTRITION | Facility: MEDICAL CENTER | Age: 65
End: 2020-04-16

## 2020-04-16 ENCOUNTER — HOSPITAL ENCOUNTER (OUTPATIENT)
Dept: RADIATION ONCOLOGY | Facility: MEDICAL CENTER | Age: 65
End: 2020-04-16
Payer: COMMERCIAL

## 2020-04-16 ENCOUNTER — HOSPITAL ENCOUNTER (OUTPATIENT)
Dept: LAB | Facility: MEDICAL CENTER | Age: 65
End: 2020-04-16
Attending: INTERNAL MEDICINE
Payer: COMMERCIAL

## 2020-04-16 LAB
BASOPHILS # BLD AUTO: 0.6 % (ref 0–1.8)
BASOPHILS # BLD: 0.04 K/UL (ref 0–0.12)
CHEMOTHERAPY INFUSION START DATE: NORMAL
CHEMOTHERAPY RECORDS: 1.8
CHEMOTHERAPY RECORDS: 4500
CHEMOTHERAPY RECORDS: NORMAL
CHEMOTHERAPY RX CANCER: NORMAL
DATE 1ST CHEMO CANCER: NORMAL
EOSINOPHIL # BLD AUTO: 0.02 K/UL (ref 0–0.51)
EOSINOPHIL NFR BLD: 0.3 % (ref 0–6.9)
ERYTHROCYTE [DISTWIDTH] IN BLOOD BY AUTOMATED COUNT: 47.2 FL (ref 35.9–50)
HCT VFR BLD AUTO: 44.7 % (ref 42–52)
HGB BLD-MCNC: 15.4 G/DL (ref 14–18)
IMM GRANULOCYTES # BLD AUTO: 0.04 K/UL (ref 0–0.11)
IMM GRANULOCYTES NFR BLD AUTO: 0.6 % (ref 0–0.9)
LYMPHOCYTES # BLD AUTO: 0.25 K/UL (ref 1–4.8)
LYMPHOCYTES NFR BLD: 4 % (ref 22–41)
MCH RBC QN AUTO: 31.6 PG (ref 27–33)
MCHC RBC AUTO-ENTMCNC: 34.5 G/DL (ref 33.7–35.3)
MCV RBC AUTO: 91.6 FL (ref 81.4–97.8)
MONOCYTES # BLD AUTO: 1.03 K/UL (ref 0–0.85)
MONOCYTES NFR BLD AUTO: 16.5 % (ref 0–13.4)
NEUTROPHILS # BLD AUTO: 4.86 K/UL (ref 1.82–7.42)
NEUTROPHILS NFR BLD: 78 % (ref 44–72)
NRBC # BLD AUTO: 0 K/UL
NRBC BLD-RTO: 0 /100 WBC
PLATELET # BLD AUTO: 191 K/UL (ref 164–446)
PMV BLD AUTO: 11.2 FL (ref 9–12.9)
RAD ONC ARIA COURSE LAST TREATMENT DATE: NORMAL
RAD ONC ARIA COURSE TREATMENT ELAPSED DAYS: NORMAL
RAD ONC ARIA REFERENCE POINT DOSAGE GIVEN TO DATE: 25.2
RAD ONC ARIA REFERENCE POINT DOSAGE GIVEN TO DATE: 25.83
RAD ONC ARIA REFERENCE POINT ID: NORMAL
RAD ONC ARIA REFERENCE POINT ID: NORMAL
RAD ONC ARIA REFERENCE POINT SESSION DOSAGE GIVEN: 1.8
RAD ONC ARIA REFERENCE POINT SESSION DOSAGE GIVEN: 1.84
RBC # BLD AUTO: 4.88 M/UL (ref 4.7–6.1)
WBC # BLD AUTO: 6.2 K/UL (ref 4.8–10.8)

## 2020-04-16 PROCEDURE — 77014 PR CT GUIDANCE PLACEMENT RAD THERAPY FIELDS: CPT | Mod: 26 | Performed by: RADIOLOGY

## 2020-04-16 PROCEDURE — 77386 HCHG IMRT DELIVERY COMPLEX: CPT | Performed by: RADIOLOGY

## 2020-04-16 PROCEDURE — 85025 COMPLETE CBC W/AUTO DIFF WBC: CPT

## 2020-04-16 NOTE — PROGRESS NOTES
Nutrition Services: Brief Update/ J-tube Education    RD able to visit pt following Xrt. Pt presents with daughter, Robert. Pt in better spirits today and relieved that will be able to receive some nutrients. Pt confirms received supplies yesterday and brought to appointment. Pt brought Kangaroo Dylan pump, Kangaroo dylan epump bag, IV pole attachment, Pivot 1.5, louisa valve, J-tube attachment, and pump backpack. Noted Robert provides majority of care for J-tube for pt. Pt states able to flush with water and Gatorade last night.     This RD able to observe teaching assistance provided by  Zahra VILLA.     Interventions:  · Pt and Robert provided brief overview of parts and function of Kangaroo Dylan pump for feeding.   · Zahra VILLA demonstrated attaching Kangaroo Dylan pump to IV pole. Robert verbalizes understanding and confidence in ability to do at home.   · ALLEN Sweeney demonstrated connecting Kangaroo epump bag to Kangaroo dylan pump. Addressed adequate hygiene with use of formula as well as pump bag. Reviewed hang time of 8 hours and discarding formula left out longer than this. Reviewed importance of not pouring more formula into bag than is going to be using in this time frame.   · Pt and Robert educated on setting up Kangaroo Dylan pump, including priming tube before feeding and setting appropriate start rate of 25 mL/hr. Encouraged to increase rate to 50 mL/hr at 4:00 pm tonight. Reviewed flushing tube with 60 mL of water before and after feedings to clear tube line.  · Reviewed importance of flushing tube with water to prevent clogging, especially if occasionally flushing with gatorade.     · Robert able to demonstrate ability to use louisa valve with J-tube attachment and insertion of epump tube into J-tube without difficulty.   · ALLEN Sweeney provided overview of pump backpack, set up, and reinforced ability to infuse formula without need of IV pole. Discussed use of such when going to chemotherapy appointment  today at 1:15pm.   · Pt provided additional louisa valve. Pt encouraged to clean all attachments thoroughly in between use to promote adequate hygiene.   · Noted pt did receive J-tube packet with information regarding hygiene, troubleshooting, and pump instructions yesterday. Oncology RD to continue follow-up care and assistance.   · Pt able to leave Radiation Therapy with Kangaroo Tunde pump in backpack infusing at starting rate of 25 mL/hr. Pt infusing 1 carton of Pivot 1.5 and approximately ~230 mL of water. Pt encouraged to infuse water at rate of 100 mL/hr after feeding of Pivot 1.5+water is completed.   · Reviewed concern of refeeding risk and importance of slow progression of advancing formula rate.     All questions and concerns answered to pt and Franke satisfaction. Denies additional concerns or questions at this time. Oncology RD to continue to follow and provide interventions.   [phone]

## 2020-04-17 ENCOUNTER — HOSPITAL ENCOUNTER (OUTPATIENT)
Dept: LAB | Facility: MEDICAL CENTER | Age: 65
End: 2020-04-17
Attending: INTERNAL MEDICINE
Payer: COMMERCIAL

## 2020-04-17 ENCOUNTER — DOCUMENTATION (OUTPATIENT)
Dept: NUTRITION | Facility: MEDICAL CENTER | Age: 65
End: 2020-04-17

## 2020-04-17 ENCOUNTER — HOSPITAL ENCOUNTER (OUTPATIENT)
Dept: RADIATION ONCOLOGY | Facility: MEDICAL CENTER | Age: 65
End: 2020-04-17
Payer: COMMERCIAL

## 2020-04-17 LAB
ALBUMIN SERPL BCP-MCNC: 3.4 G/DL (ref 3.2–4.9)
ALBUMIN/GLOB SERPL: 1.1 G/DL
ALP SERPL-CCNC: 69 U/L (ref 30–99)
ALT SERPL-CCNC: 8 U/L (ref 2–50)
ANION GAP SERPL CALC-SCNC: 13 MMOL/L (ref 7–16)
AST SERPL-CCNC: 10 U/L (ref 12–45)
BILIRUB SERPL-MCNC: 1.8 MG/DL (ref 0.1–1.5)
BUN SERPL-MCNC: 17 MG/DL (ref 8–22)
CALCIUM SERPL-MCNC: 8.6 MG/DL (ref 8.5–10.5)
CHEMOTHERAPY INFUSION START DATE: NORMAL
CHEMOTHERAPY RECORDS: 1.8
CHEMOTHERAPY RECORDS: 4500
CHEMOTHERAPY RECORDS: NORMAL
CHEMOTHERAPY RX CANCER: NORMAL
CHLORIDE SERPL-SCNC: 101 MMOL/L (ref 96–112)
CO2 SERPL-SCNC: 21 MMOL/L (ref 20–33)
CREAT SERPL-MCNC: 0.88 MG/DL (ref 0.5–1.4)
DATE 1ST CHEMO CANCER: NORMAL
GLOBULIN SER CALC-MCNC: 3.2 G/DL (ref 1.9–3.5)
GLUCOSE SERPL-MCNC: 123 MG/DL (ref 65–99)
POTASSIUM SERPL-SCNC: 3.6 MMOL/L (ref 3.6–5.5)
PROT SERPL-MCNC: 6.6 G/DL (ref 6–8.2)
RAD ONC ARIA COURSE LAST TREATMENT DATE: NORMAL
RAD ONC ARIA COURSE TREATMENT ELAPSED DAYS: NORMAL
RAD ONC ARIA REFERENCE POINT DOSAGE GIVEN TO DATE: 27
RAD ONC ARIA REFERENCE POINT DOSAGE GIVEN TO DATE: 27.67
RAD ONC ARIA REFERENCE POINT ID: NORMAL
RAD ONC ARIA REFERENCE POINT ID: NORMAL
RAD ONC ARIA REFERENCE POINT SESSION DOSAGE GIVEN: 1.8
RAD ONC ARIA REFERENCE POINT SESSION DOSAGE GIVEN: 1.84
SODIUM SERPL-SCNC: 135 MMOL/L (ref 135–145)

## 2020-04-17 PROCEDURE — 77427 RADIATION TX MANAGEMENT X5: CPT | Performed by: RADIOLOGY

## 2020-04-17 PROCEDURE — 80053 COMPREHEN METABOLIC PANEL: CPT

## 2020-04-17 PROCEDURE — 77014 PR CT GUIDANCE PLACEMENT RAD THERAPY FIELDS: CPT | Mod: 26 | Performed by: RADIOLOGY

## 2020-04-17 PROCEDURE — 77386 HCHG IMRT DELIVERY COMPLEX: CPT | Performed by: RADIOLOGY

## 2020-04-17 PROCEDURE — 36415 COLL VENOUS BLD VENIPUNCTURE: CPT

## 2020-04-17 NOTE — PROGRESS NOTES
Nutrition Services: Brief Update  Received call this morning from patients daughter regarding patients feeding tube being clogged. Daughter reports that all of his morning meds clogged the feeding tube and she was unable to clear it with water.  Discussed option for enzyme to clear tube and advised her to call pharmacy prior to coming in for radiation this morning.    Following radiation appointment I met with patient and his daughter.  Norberto reports that his tube is still clogged and when he called the pharmacy he was told they did not have anything that could unclog the tube and it would need to be replaced. I insured patient that this likely was not the case and he was agreeable to having me attempt to unclog the tube.  Luckily with some tube massage/manipulation and flushing with warm water I was able to clear the tube with 60 ml of water.  Patient very thankful and tearful.  Discussed with patient and daughter proper medication administration and the importance of making sure there was enough water mixed with medications to flow through the tube. Advised going more slowly and doing one medication bolus at a time followed by water flush.  Patient worried this will happen again and concerned with what to do over the weekend. I advised patient to continue flushing regularly and that he should not use Coke to clear a clogged tube - he reports Dr. Ganser's office instructed him to do this.  We discussed possibility of using Papaya Enzymes crushed and mixed with water although his tube is very small and this may not be a realistic option.  I advised patient to present to the ER should clog occur over the weekend and if he is unable to resolve it.  Discussed importance again of ensuring adequate water mixed with medications to prevent future clogs.    We discussed patient feeding routine.  Yesterday patient was able to increase continuous feeds to 50 ml/hr and then 75 ml/hr and completed 1 container with added ~ 230  ml water for hydration.  Patient notes he was able to tolerate this well.  We discussed my concern for refeeding syndrome and the need to go very slow with advancing to full goal feeds. Advised patient to run feeds at 75 ml/hr this afternoon (of note he does not wish to run overnight) for 1-2 containers with the goal to get up to 100 ml/hr over the weekend.  Patient agreeable and expressed understanding.     RD will continue to monitor and provided support.  [phone]

## 2020-04-20 ENCOUNTER — HOSPITAL ENCOUNTER (OUTPATIENT)
Dept: RADIATION ONCOLOGY | Facility: MEDICAL CENTER | Age: 65
End: 2020-04-20
Payer: COMMERCIAL

## 2020-04-20 ENCOUNTER — DOCUMENTATION (OUTPATIENT)
Dept: NUTRITION | Facility: MEDICAL CENTER | Age: 65
End: 2020-04-20

## 2020-04-20 LAB
CHEMOTHERAPY INFUSION START DATE: NORMAL
CHEMOTHERAPY RECORDS: 1.8
CHEMOTHERAPY RECORDS: 4500
CHEMOTHERAPY RECORDS: NORMAL
CHEMOTHERAPY RX CANCER: NORMAL
DATE 1ST CHEMO CANCER: NORMAL
RAD ONC ARIA COURSE LAST TREATMENT DATE: NORMAL
RAD ONC ARIA COURSE TREATMENT ELAPSED DAYS: NORMAL
RAD ONC ARIA REFERENCE POINT DOSAGE GIVEN TO DATE: 28.8
RAD ONC ARIA REFERENCE POINT DOSAGE GIVEN TO DATE: 29.52
RAD ONC ARIA REFERENCE POINT ID: NORMAL
RAD ONC ARIA REFERENCE POINT ID: NORMAL
RAD ONC ARIA REFERENCE POINT SESSION DOSAGE GIVEN: 1.8
RAD ONC ARIA REFERENCE POINT SESSION DOSAGE GIVEN: 1.84

## 2020-04-20 PROCEDURE — 77386 HCHG IMRT DELIVERY COMPLEX: CPT | Performed by: RADIOLOGY

## 2020-04-20 PROCEDURE — 77014 PR CT GUIDANCE PLACEMENT RAD THERAPY FIELDS: CPT | Mod: 26 | Performed by: RADIOLOGY

## 2020-04-20 NOTE — PROGRESS NOTES
"Nutrition Services: Brief Update  Weight: No new weight yet this week  Weight History:  211 lbs on 4/15/20  221 lbs on 4/08/20  227 lbs, weighed on 4/01/20    Weight Trend: pt wt decreased since start of treatment. RD anticipating further decrease within next week given difficulty with J-tube and initiating formula.    RD able to visit pt briefly following Xrt. Pt states had a rough day yesterday as tube was clogged again. Mentions worked on tube for 12 hours using cranberry juice, coke, and papaya enzymes. Pt recalls was told not to use coke and such, though felt desperate and din't know what else to do. States his neighbor ended up coming by as her  has a feeding tube as well. Mentions this neighbor was able to declog the tube using a \"small, long, braided stainless steel\" piece. Pt states would very much like to have one of those. States today as been going well otherwise. Mentions was able to run pump at rate of 100 mL/hr and has been wearing backpack and adding containers as he goes.     Plan/Recommend:  • RD discussed clog zapper as treatment available on hand, though will look into further products that may be able to assist pt and if can be purchased OTC. Pt's neighbor may have used product similar to [employer] feeding tube declogger, RD to follow-up.  • RD reviewed avoidance of coke/ cranberry juice to declog tube as can make matters worse given stickiness and acidity of beverages. Briefly reviewed declogging with warm water and gentle push/pull method first.   • Recommended increasing rate to 125 ml/hr tomorrow if continues to infuse well at 100 mL/hr. Reviewed hang time of 8-12 hours only.     RD to continue to monitor throughout treatment.  Please contact [phone]    "

## 2020-04-21 ENCOUNTER — HOSPITAL ENCOUNTER (OUTPATIENT)
Dept: RADIATION ONCOLOGY | Facility: MEDICAL CENTER | Age: 65
End: 2020-04-21
Payer: COMMERCIAL

## 2020-04-21 LAB
CHEMOTHERAPY INFUSION START DATE: NORMAL
CHEMOTHERAPY RECORDS: 1.8
CHEMOTHERAPY RECORDS: 4500
CHEMOTHERAPY RECORDS: NORMAL
CHEMOTHERAPY RX CANCER: NORMAL
DATE 1ST CHEMO CANCER: NORMAL
RAD ONC ARIA COURSE LAST TREATMENT DATE: NORMAL
RAD ONC ARIA COURSE TREATMENT ELAPSED DAYS: NORMAL
RAD ONC ARIA REFERENCE POINT DOSAGE GIVEN TO DATE: 30.6
RAD ONC ARIA REFERENCE POINT DOSAGE GIVEN TO DATE: 31.36
RAD ONC ARIA REFERENCE POINT ID: NORMAL
RAD ONC ARIA REFERENCE POINT ID: NORMAL
RAD ONC ARIA REFERENCE POINT SESSION DOSAGE GIVEN: 1.8
RAD ONC ARIA REFERENCE POINT SESSION DOSAGE GIVEN: 1.84

## 2020-04-21 PROCEDURE — 77014 PR CT GUIDANCE PLACEMENT RAD THERAPY FIELDS: CPT | Mod: 26 | Performed by: RADIOLOGY

## 2020-04-21 PROCEDURE — 77386 HCHG IMRT DELIVERY COMPLEX: CPT | Performed by: RADIOLOGY

## 2020-04-22 ENCOUNTER — DOCUMENTATION (OUTPATIENT)
Dept: NUTRITION | Facility: MEDICAL CENTER | Age: 65
End: 2020-04-22

## 2020-04-22 ENCOUNTER — HOSPITAL ENCOUNTER (OUTPATIENT)
Dept: RADIATION ONCOLOGY | Facility: MEDICAL CENTER | Age: 65
End: 2020-04-22
Payer: COMMERCIAL

## 2020-04-22 VITALS
HEIGHT: 74 IN | BODY MASS INDEX: 27.07 KG/M2 | OXYGEN SATURATION: 95 % | SYSTOLIC BLOOD PRESSURE: 108 MMHG | DIASTOLIC BLOOD PRESSURE: 86 MMHG | TEMPERATURE: 96.9 F | HEART RATE: 68 BPM | WEIGHT: 210.9 LBS

## 2020-04-22 DIAGNOSIS — C15.5 MALIGNANT NEOPLASM OF LOWER THIRD OF ESOPHAGUS (HCC): ICD-10-CM

## 2020-04-22 LAB
CHEMOTHERAPY INFUSION START DATE: NORMAL
CHEMOTHERAPY RECORDS: 1.8
CHEMOTHERAPY RECORDS: 4500
CHEMOTHERAPY RECORDS: NORMAL
CHEMOTHERAPY RX CANCER: NORMAL
DATE 1ST CHEMO CANCER: NORMAL
RAD ONC ARIA COURSE LAST TREATMENT DATE: NORMAL
RAD ONC ARIA COURSE TREATMENT ELAPSED DAYS: NORMAL
RAD ONC ARIA REFERENCE POINT DOSAGE GIVEN TO DATE: 32.4
RAD ONC ARIA REFERENCE POINT DOSAGE GIVEN TO DATE: 33.21
RAD ONC ARIA REFERENCE POINT ID: NORMAL
RAD ONC ARIA REFERENCE POINT ID: NORMAL
RAD ONC ARIA REFERENCE POINT SESSION DOSAGE GIVEN: 1.8
RAD ONC ARIA REFERENCE POINT SESSION DOSAGE GIVEN: 1.84

## 2020-04-22 PROCEDURE — 77386 HCHG IMRT DELIVERY COMPLEX: CPT | Performed by: RADIOLOGY

## 2020-04-22 PROCEDURE — 77014 PR CT GUIDANCE PLACEMENT RAD THERAPY FIELDS: CPT | Mod: 26 | Performed by: RADIOLOGY

## 2020-04-22 PROCEDURE — 77336 RADIATION PHYSICS CONSULT: CPT | Performed by: RADIOLOGY

## 2020-04-22 RX ORDER — LIDOCAINE HYDROCHLORIDE 20 MG/ML
SOLUTION OROPHARYNGEAL
Qty: 200 ML | Refills: 3 | Status: SHIPPED | OUTPATIENT
Start: 2020-04-22 | End: 2020-06-17

## 2020-04-22 ASSESSMENT — FIBROSIS 4 INDEX: FIB4 SCORE: 1.17

## 2020-04-22 ASSESSMENT — PAIN SCALES - GENERAL: PAINLEVEL: 1=MINIMAL PAIN

## 2020-04-22 NOTE — ON TREATMENT VISIT
"  ON TREATMENT  NOTE  RADIATION ONCOLOGY DEPARTMENT    Patient name:  Norberto White    Primary Physician:  Alhaji Fenton M.D. MRN: [id number]  CSN: [id number]   Referring physician:  Jerry Bates M.D. : 1955, 64 y.o.     ENCOUNTER DATE:  20    DIAGNOSIS:  Visit Diagnoses     ICD-10-CM   1. Malignant neoplasm of lower third of esophagus (HCC) C15.5     Malignant neoplasm of lower third of esophagus (HCC)  Staging form: Esophagus - Adenocarcinoma, AJCC 8th Edition  - Clinical: Stage III (cT3, cN0, cM0, G3) - Signed by Abelardo Arias M.D. on 3/16/2020  Total positive nodes: 0  Histologic grading system: 3 grade system      TREATMENT SUMMARY:  [facility] Treatment Information        Some values may be hidden. Unless noted otherwise, only the newest values recorded on each date are displayed.         [facility] Treatment Summary 20   Course First Treatment Date 2020   Course Last Treatment Date 2020   Esophagus Plan from Course C1_Esophagus   Fraction 18 of 25   Elapsed Course Days  @    Prescribed Fraction Dose 180 cGy   Prescribed Total Dose 4,500 cGy   Esophagus cp Reference Point from Course C1_Esophagus   Elapsed Course Days  @    Session Dose 184 cGy   Total Dose 3,321 cGy   PTV45 Reference Point from Course C1_Esophagus   Elapsed Course Days  @    Session Dose 180 cGy   Total Dose 3,240 cGy             SUBJECTIVE:  Well appearing, using j tube, moderate esophagitis      VITAL SIGNS:  KPS: 100, Fully active, able to carry on all pre-disease performed without restriction (ECOG equivalent 0)  Encounter Vitals  Temperature: 36.1 °C (96.9 °F)  Temp src: Temporal  Blood Pressure: 108/86  Pulse: 68  Pulse Oximetry: 95 %  Weight: 95.7 kg (210 lb 14.4 oz)  Height: 188 cm (6' 2\")  BMI (Calculated): 27.08  Pain Score: 1=Minimal Pain  Pain Assessment 2020 4/15/2020   Pain Assessment Acute Pain -   Pain Score 1 2   Pain Loc Throat Abdomen   What " increases pain? swallowing -   Describe pain Burning -   Some recent data might be hidden          PHYSICAL EXAM:    Physical Exam  Constitutional:       Appearance: Normal appearance.   Abdominal:      General: There is no distension.      Palpations: Abdomen is soft.   Skin:     Findings: No erythema.   Neurological:      Mental Status: He is alert.          Toxicity Assessment 4/22/2020 4/8/2020 4/1/2020   Toxicity Assessment Chest Abdomen Abdomen   Fatigue (lethargy, malaise, asthenia) Moderate (e.g., decrease in performance status by 1 ECOG level or 20% Karnofsky) or causing difficulty performing some activities Increased fatigue over baseline, but not altering normal activities None   Radiation Dermatitis None None None   Anorexia Loss of appetite None Loss of appetite   Dehydration - None None   Diarrhea w/o Colostomy - None None   Nausea - None None   Vomiting - None None   Dyspepsia/heartburn None None None   Dysphagia, Esophagitis, odynophagoa (painful swallowing) Dysphagia, requiring predominantly pureed, soft, or liquid diet Mild dysphagia, but can eat regular diet Mild dysphagia, but can eat regular diet   Gastritis - None None   RT Dysphagia-Esophageal Dysphagia, requiring feeding tube, IV hydration or hyperlimentation - -   Abdominal Pain or cramping - Mild pain not interfering with function None   Cough Mild, relieved by non-prescription medication - -   Dyspnea Normal - -       CURRENT MEDICATIONS:    Current Outpatient Medications:   •  lidocaine (XYLOCAINE) 2 % Solution, Mix with equal part Maalox. 1 Tbsp q 4 hours prn mouth pain., Disp: 200 mL, Rfl: 3  •  atorvastatin (LIPITOR) 80 MG tablet, Take 80 mg by mouth every evening., Disp: , Rfl:   •  doxazosin (CARDURA) 4 MG Tab, Take 4 mg by mouth every day., Disp: , Rfl:   •  ezetimibe (ZETIA) 10 MG Tab, Take 10 mg by mouth every day., Disp: , Rfl:   •  fluticasone-salmeterol (ADVAIR) 500-50 MCG/DOSE AEROSOL POWDER, BREATH ACTIVATED, Inhale 1 Puff by  mouth every 12 hours., Disp: , Rfl:   •  furosemide (LASIX) 20 MG Tab, Take 20 mg by mouth every day., Disp: , Rfl:   •  potassium chloride SA (K-DUR) 10 MEQ Tab CR, Take 10 mEq by mouth 2 times a day., Disp: , Rfl:   •  tiotropium (SPIRIVA) 18 MCG Cap, Inhale 18 mcg by mouth every day., Disp: , Rfl:   •  temazepam (RESTORIL) 15 MG Cap, Take 15 mg by mouth every bedtime., Disp: , Rfl:   •  ondansetron (ZOFRAN ODT) 8 MG TABLET DISPERSIBLE, Take 8 mg by mouth every 12 hours as needed for Nausea., Disp: , Rfl:   •  prochlorperazine (COMPAZINE) 10 MG Tab, Take 10 mg by mouth every 8 hours as needed for Nausea/Vomiting., Disp: , Rfl:   •  BRILINTA 90 MG Tab tablet, Take 90 mg by mouth 2 Times a Day., Disp: , Rfl:   •  metoprolol SR (TOPROL XL) 25 MG TABLET SR 24 HR, Take 25 mg by mouth every day., Disp: , Rfl:   •  benazepril (LOTENSIN) 20 MG Tab, Take 1 Tab by mouth every day., Disp: 90 Tab, Rfl: 3  •  nitroglycerin (NITROSTAT) 0.4 MG SL Tab, Place 1 Tab under tongue as needed for Chest Pain (Place 1 tablet under the tongue every 5 minutes up to 3 doses as needed for chest pain)., Disp: 75 Tab, Rfl: 3  •  omeprazole (PRILOSEC) 40 MG delayed-release capsule, Take 40 mg by mouth every day., Disp: , Rfl:   •  levothyroxine (SYNTHROID) 75 MCG Tab, Take 75 mcg by mouth Every morning on an empty stomach., Disp: , Rfl:   •  Melatonin 10 MG Tab, Take 10 mg by mouth every bedtime., Disp: , Rfl:   •  Cranberry 500 MG Cap, Take 1 Cap by mouth every day., Disp: , Rfl:   •  Zinc 50 MG Cap, Take 50 mg by mouth every day., Disp: , Rfl:   •  diphenhydrAMINE (BENADRYL) 25 MG Tab, Take 25 mg by mouth every bedtime., Disp: , Rfl:   •  aspirin EC (ECOTRIN) 81 MG TBEC, Take 81 mg by mouth every day., Disp: , Rfl:   •  tamsulosin (FLOMAX) 0.4 MG capsule, Take 0.4 mg by mouth ONE-HALF HOUR AFTER BREAKFAST., Disp: , Rfl:     LABORATORY DATA:   Lab Results   Component Value Date/Time    SODIUM 135 04/17/2020 11:48 AM    POTASSIUM 3.6 04/17/2020  11:48 AM    CHLORIDE 101 04/17/2020 11:48 AM    CO2 21 04/17/2020 11:48 AM    GLUCOSE 123 (H) 04/17/2020 11:48 AM    BUN 17 04/17/2020 11:48 AM    CREATININE 0.88 04/17/2020 11:48 AM         Lab Results   Component Value Date/Time    WBC 6.2 04/16/2020 01:57 PM    HEMOGLOBIN 15.4 04/16/2020 01:57 PM    HEMATOCRIT 44.7 04/16/2020 01:57 PM    MCV 91.6 04/16/2020 01:57 PM    PLATELETCT 191 04/16/2020 01:57 PM        RADIOLOGY DATA:  No results found.    IMPRESSION:  Malignant neoplasm of lower third of esophagus (HCC)  Staging form: Esophagus - Adenocarcinoma, AJCC 8th Edition  - Clinical: Stage III (cT3, cN0, cM0, G3) - Signed by Abelardo Arias M.D. on 3/16/2020  Total positive nodes: 0  Histologic grading system: 3 grade system      PLAN:  No change in treatment plan    Disposition:  Treatment plan reviewed. Questions answered. Continue therapy outlined. Given MMW.    Abelardo Arias M.D.    Orders Placed This Encounter   • lidocaine (XYLOCAINE) 2 % Solution

## 2020-04-22 NOTE — PROGRESS NOTES
Nutrition Services: Brief Update    Weight: 210 lbs, weighed today during OTV.  Weight History:  211 lbs on 4/15/20    Weight change: Wt desirably stable at this time.     11:15: RD able to visit pt during OTV. Pt states is doing very well and has not had any clogs or issues. States was tolerating and administering Pivot 1.5 at rate of 125 mL/hr. Mentions is using Pivot 1.5 + 1 carton of water at 8am. States around 11-11:30am will administer another 1 carton of Pivot 1.5 and 1 carton of water. States will flush pt with total of 810 syringes per day for flushing and medication administration. States toward the end of the ay will continue to add water until pt goes to be around 11pm.     RD advised increasing rate to 150 mL/hr and assessing tolerance. Reviewed signs of intolerance. Pt appears to be obtaining >750mL free water through added water and syringes throughout day. RD recommended next goal as using 2 cartons of Pivot 1.5 during 11:30 meal time in order to steadily increase amount of formula throughout the ay, as pt goal is 7 cartons per day. Pt and daughter agreeable to this goal.    RD previously saw pt earlier today and pt reported no difficulties at this time. Robert, Pt daughter, calls RD in between 1:00-2:00 pm and states tube is clogged again and is having difficulty with unclogging. States is using cranberry juice, coke, and warm water with push/pull method to unclog. Mentions is allowing cranberry juice to sit at this time. RD again advised to discontinue using cranberry juice and coke as this can further exacerbate clogging. Noted RD has repeatedly discouraged this practice and this is likely contributing to frequent clogging, in addition to multiple medication administrations via J-tube.    Pt presented to Radiation Oncology to see RD as has not been able to unclog feeding tube. RD attempted to unclog tube with warm water flushes and push/pull method. Tube persisted with clog despite these  interventions. RD obtained verbal order from Dr. Arias to utilize Clog Zapper. RD administered 5 mL of Clog Zapper into J-tube. Instructed pt to allow to sit for 1 hour and to try flushing with 10 mL of warm water at that time. Recommended place vial with remaining Clog Zapper in refrigerator during this 1 hour.  If clog persists, instructed to administer remaining 5 mL of Clog Zapper and allowing again to sit for another hour. After this hour has passed, instructed to flush again with warm water. Instructed pt to see RD tomorrow for further intervention.    Plan:  · Reviewed instructions for administering Clog Zapper. Pt and daughter verbalize understanding. Provided remainder of Clog Zapper with written instructions and visuals.   · RD reviewed discontinuation of cranberry juice and Coke. Continued to stress clogging capabilities of sugar-containing liquids.   · RD discussed options: can discuss with MD regarding prescription for Clog Zapper if this option is successful in dislodging clog, otherwise, RD discussed that may need to have tube replaced.   · RD encouraged pt to reach out to pharmacist or Dr. Ganser as needs alternatives for medications, such as liquid forms if available, as this is very likely contributing to frequent clogging of tube. RD encouraged to spread medications out and discussed using adequate amounts of water to dissolve pills.   · RD emailed [employer] for J-tube declogger device sample pack. Pt states also ordered a declogging device through [employer] yesterday. Will follow-up.     RD following  [phone]

## 2020-04-23 ENCOUNTER — HOSPITAL ENCOUNTER (OUTPATIENT)
Dept: RADIATION ONCOLOGY | Facility: MEDICAL CENTER | Age: 65
End: 2020-04-23
Payer: COMMERCIAL

## 2020-04-23 ENCOUNTER — DOCUMENTATION (OUTPATIENT)
Dept: NUTRITION | Facility: MEDICAL CENTER | Age: 65
End: 2020-04-23

## 2020-04-23 LAB
CHEMOTHERAPY INFUSION START DATE: NORMAL
CHEMOTHERAPY RECORDS: 1.8
CHEMOTHERAPY RECORDS: 4500
CHEMOTHERAPY RECORDS: NORMAL
CHEMOTHERAPY RX CANCER: NORMAL
DATE 1ST CHEMO CANCER: NORMAL
RAD ONC ARIA COURSE LAST TREATMENT DATE: NORMAL
RAD ONC ARIA COURSE TREATMENT ELAPSED DAYS: NORMAL
RAD ONC ARIA REFERENCE POINT DOSAGE GIVEN TO DATE: 34.2
RAD ONC ARIA REFERENCE POINT DOSAGE GIVEN TO DATE: 35.05
RAD ONC ARIA REFERENCE POINT ID: NORMAL
RAD ONC ARIA REFERENCE POINT ID: NORMAL
RAD ONC ARIA REFERENCE POINT SESSION DOSAGE GIVEN: 1.8
RAD ONC ARIA REFERENCE POINT SESSION DOSAGE GIVEN: 1.84

## 2020-04-23 PROCEDURE — 77386 HCHG IMRT DELIVERY COMPLEX: CPT | Performed by: RADIOLOGY

## 2020-04-23 PROCEDURE — 77014 PR CT GUIDANCE PLACEMENT RAD THERAPY FIELDS: CPT | Mod: 26 | Performed by: RADIOLOGY

## 2020-04-23 NOTE — PROGRESS NOTES
"Nutrition Services: Brief Update    Pt presents to RD following Xrt. States has been unable to unclog tube using Clog Zapper. States administered rest of this medication yesterday and it just spilled out. Pt relays frustration regarding lack of adequate communication with medications. Pt states in need of plan of action as called the surgeon and did not receive a response and is feeling flustered. RD discussed will ensure pt needs are addressed. Pt states would like to wait at home and will come in to see surgeon or ER as needed.     RD discussed case with members of treatment team, including Dr. Arias. Discussed need for consult from Dr. Ganser given feeding tube is clogged more than 24 hours. This is impacting pt ability to take cardiac medications, which is immensely problematic given pt history of multiple heart attacks. Dr. Arias communicated pt needs to Dr. Ganser.    At approximately 1:30pm, Dr. Arias communicated with this RD that Dr. Ganser was able to dislodge clog from J-tube. RD promptly called pt daughter Franke for review. Robert states Dr. Ganser able to unclog tube utilizing \"long device that is utilized in other surgeries\". She states has been in contact with \"a pharmacist at [facility] on [place]\" to discuss medications and the interaction with feeding tube. Pt states Surgeon was also concerned involving 150 ml/hr rate of J-tube and recommended decreasing. She states will decrease to 135 ml/hr and will follow-up with RD for recommendations again next week. RD contacted Pharmacy Clinical Specialist, Alia Rebollar, in addressing medication concerns in regards to J-tube. RD also exchanged emails with Isaac Feliciano, clinical pharmacist, for assistance with medications, who writes:    \"There are definite problems if he is actually taking some of these. I am not used to looking on this side of things, but…  Toprol XL 25 should be changed to Lopressor 12.5 mg PO BID  K-DUR 10 mEq should be changed to the liquid " "or packets  Not sure he is still taking the omeprazole but I would try Pepcid/Zantac OTC     Everything else should be fine. I would be most c/o potassium clogging the tube and hypotension from the Toprol. \"    Plan/Recommendations:  · RD contacted [facility] to [facility]clogging tube. Appears only declogger is the Clog Zapper, though was told a supervisor would follow up for more information as able.   · RD to discuss options with supervisor involving ordering of decloggers pending available supplies in [facility].   · RD to continue follow-up with pharmacy to ensure medication concerns are addressed as these are likely main cause of clogging for pt J-tube. In current contact with Pharmacy.  · RD to relay medication concerns and updates to pt's cardiologist on file: Keon Cavazos.  · Per peer-reviewed article, (JOSEPH Bautista, ANAYELI Mayen. Part III Jejunal Enteral Feeding: The tail is wagging the Dog(ma) Dispelling Myths with Physiology, Evidence, and Clinical Experience. Practical Gastroentery. 2019, s 185.) jejunal feedings can safely infuse over higher rates than 100 ml/hr. RD to follow-up with evidence-based guidelines.      RD monitoring closely.       "

## 2020-04-24 ENCOUNTER — HOSPITAL ENCOUNTER (OUTPATIENT)
Dept: RADIATION ONCOLOGY | Facility: MEDICAL CENTER | Age: 65
End: 2020-04-24
Payer: COMMERCIAL

## 2020-04-24 ENCOUNTER — HOSPITAL ENCOUNTER (OUTPATIENT)
Dept: LAB | Facility: MEDICAL CENTER | Age: 65
End: 2020-04-24
Attending: RADIOLOGY
Payer: COMMERCIAL

## 2020-04-24 LAB
ALBUMIN SERPL BCP-MCNC: 3.6 G/DL (ref 3.2–4.9)
ALBUMIN/GLOB SERPL: 1.2 G/DL
ALP SERPL-CCNC: 67 U/L (ref 30–99)
ALT SERPL-CCNC: 25 U/L (ref 2–50)
ANION GAP SERPL CALC-SCNC: 12 MMOL/L (ref 7–16)
AST SERPL-CCNC: 22 U/L (ref 12–45)
BILIRUB SERPL-MCNC: 1.3 MG/DL (ref 0.1–1.5)
BUN SERPL-MCNC: 16 MG/DL (ref 8–22)
CALCIUM SERPL-MCNC: 8.6 MG/DL (ref 8.5–10.5)
CHEMOTHERAPY INFUSION START DATE: NORMAL
CHEMOTHERAPY RECORDS: 1.8
CHEMOTHERAPY RECORDS: 4500
CHEMOTHERAPY RECORDS: NORMAL
CHEMOTHERAPY RX CANCER: NORMAL
CHLORIDE SERPL-SCNC: 99 MMOL/L (ref 96–112)
CO2 SERPL-SCNC: 22 MMOL/L (ref 20–33)
CREAT SERPL-MCNC: 0.76 MG/DL (ref 0.5–1.4)
DATE 1ST CHEMO CANCER: NORMAL
GLOBULIN SER CALC-MCNC: 3 G/DL (ref 1.9–3.5)
GLUCOSE SERPL-MCNC: 126 MG/DL (ref 65–99)
POTASSIUM SERPL-SCNC: 4 MMOL/L (ref 3.6–5.5)
PROT SERPL-MCNC: 6.6 G/DL (ref 6–8.2)
RAD ONC ARIA COURSE LAST TREATMENT DATE: NORMAL
RAD ONC ARIA COURSE TREATMENT ELAPSED DAYS: NORMAL
RAD ONC ARIA REFERENCE POINT DOSAGE GIVEN TO DATE: 36
RAD ONC ARIA REFERENCE POINT DOSAGE GIVEN TO DATE: 36.9
RAD ONC ARIA REFERENCE POINT ID: NORMAL
RAD ONC ARIA REFERENCE POINT ID: NORMAL
RAD ONC ARIA REFERENCE POINT SESSION DOSAGE GIVEN: 1.8
RAD ONC ARIA REFERENCE POINT SESSION DOSAGE GIVEN: 1.84
SODIUM SERPL-SCNC: 133 MMOL/L (ref 135–145)

## 2020-04-24 PROCEDURE — 77014 PR CT GUIDANCE PLACEMENT RAD THERAPY FIELDS: CPT | Mod: 26 | Performed by: RADIOLOGY

## 2020-04-24 PROCEDURE — 77427 RADIATION TX MANAGEMENT X5: CPT | Performed by: RADIOLOGY

## 2020-04-24 PROCEDURE — 77386 HCHG IMRT DELIVERY COMPLEX: CPT | Performed by: RADIOLOGY

## 2020-04-24 PROCEDURE — 80053 COMPREHEN METABOLIC PANEL: CPT

## 2020-04-24 PROCEDURE — 36415 COLL VENOUS BLD VENIPUNCTURE: CPT

## 2020-04-27 ENCOUNTER — APPOINTMENT (OUTPATIENT)
Dept: RADIATION ONCOLOGY | Facility: MEDICAL CENTER | Age: 65
End: 2020-04-27
Payer: COMMERCIAL

## 2020-04-27 ENCOUNTER — TELEPHONE (OUTPATIENT)
Dept: PULMONOLOGY | Facility: HOSPICE | Age: 65
End: 2020-04-27

## 2020-04-27 ENCOUNTER — DOCUMENTATION (OUTPATIENT)
Dept: NUTRITION | Facility: MEDICAL CENTER | Age: 65
End: 2020-04-27

## 2020-04-27 DIAGNOSIS — F51.04 CHRONIC INSOMNIA: ICD-10-CM

## 2020-04-27 LAB
CHEMOTHERAPY INFUSION START DATE: NORMAL
CHEMOTHERAPY RECORDS: 1.8
CHEMOTHERAPY RECORDS: 4500
CHEMOTHERAPY RECORDS: NORMAL
CHEMOTHERAPY RX CANCER: NORMAL
DATE 1ST CHEMO CANCER: NORMAL
RAD ONC ARIA COURSE LAST TREATMENT DATE: NORMAL
RAD ONC ARIA COURSE TREATMENT ELAPSED DAYS: NORMAL
RAD ONC ARIA REFERENCE POINT DOSAGE GIVEN TO DATE: 37.8
RAD ONC ARIA REFERENCE POINT DOSAGE GIVEN TO DATE: 38.74
RAD ONC ARIA REFERENCE POINT ID: NORMAL
RAD ONC ARIA REFERENCE POINT ID: NORMAL
RAD ONC ARIA REFERENCE POINT SESSION DOSAGE GIVEN: 1.8
RAD ONC ARIA REFERENCE POINT SESSION DOSAGE GIVEN: 1.84

## 2020-04-27 PROCEDURE — 77338 DESIGN MLC DEVICE FOR IMRT: CPT | Mod: 26 | Performed by: RADIOLOGY

## 2020-04-27 PROCEDURE — 77014 PR CT GUIDANCE PLACEMENT RAD THERAPY FIELDS: CPT | Mod: 26 | Performed by: RADIOLOGY

## 2020-04-27 PROCEDURE — 77300 RADIATION THERAPY DOSE PLAN: CPT | Mod: 26 | Performed by: RADIOLOGY

## 2020-04-27 PROCEDURE — 77338 DESIGN MLC DEVICE FOR IMRT: CPT | Performed by: RADIOLOGY

## 2020-04-27 PROCEDURE — 77300 RADIATION THERAPY DOSE PLAN: CPT | Performed by: RADIOLOGY

## 2020-04-27 PROCEDURE — 77386 HCHG IMRT DELIVERY COMPLEX: CPT | Performed by: RADIOLOGY

## 2020-04-27 NOTE — PROGRESS NOTES
Nutrition Services: Brief Update  Weight: 210 lbs at OTV on 4/22/20  Weight history:   211 lbs on 4/15/20  221 lbs on 4/8/20  227 lbs on 4/1/20    Weight Change: Pt has stable 1 lb weight loss over last 1 week.    Pt seen after XRT today with backpack and J-tube tube feeding running. Pt is currently running Pivot 1.5 at 135 ml/hr and pt reports is tolerating tubefeed. Pt reports doing 6 syringes daily with free water, which is 360 ml free water and some additional water in bag. Pt is currently using ~3 Pivot 1.5 cartons daily, which is up from 2 cartons daily at end of last week. Pt reports has not had a BM in a few days and reports last BM was sometimes last week. Pt reports feeling a little lethargic on Saturday 2 days ago. Pt reports MD from Gen Surg discussed wanting rate to be at 135 ml/hr and pt needs to have feeding at night; however pt refused. Pt reports is not sleeping well at night. Pt reports has not had any further clogs in J-tube since Dr. Ganser was able to unclog tube on 4/23. Pt states his daughter ordered a pill  from [employer] and this has helped to break up the capsule enough that no further problems have happened with tube.    Plan/Recommend:  • Discussed pt could be lethargic and tired from treatment and possibly also from getting less than 50% of needs from tubefeed.   • Discussed goal of Pivot 1.5 - 7 cartons daily to meet 100% of kcal and protein needs. Encouraged to continue to increase rate from 135 ml/hr as tolerated so that he can meet daily needs since pt does not want to do tubefeed at night.  • Encouraged adequate water intake via J-tube for hydration.    RD to continue to monitor throughout treatment.  Please contact [phone]

## 2020-04-27 NOTE — TELEPHONE ENCOUNTER
Pt called and is requesting a higher dosage on his Temazepam. The current dosage is not helping him.  He's not sure if it's from being on chemo, steroids, or the feeding tube(w/the feeding tube he has to crush the pill to go into his feeding tube).  Pt said he can't sleep with his current dose.  He asked if Juliet Martin PA-C would write a higher dosage for 30 days to see if that helps.     Please advise.     Have we ever prescribed this med? Yes.  If yes, what date? 03/31/20    Last OV: 09/05/19 with Juliet Martin PA-C    Next OV: 09/08/20 with Juliet Martin PA-C    DX: Insomnia, unspecified type (G47.00    Medications: Temazepam

## 2020-04-28 ENCOUNTER — HOSPITAL ENCOUNTER (OUTPATIENT)
Dept: RADIATION ONCOLOGY | Facility: MEDICAL CENTER | Age: 65
End: 2020-04-28
Payer: COMMERCIAL

## 2020-04-28 LAB
CHEMOTHERAPY INFUSION START DATE: NORMAL
CHEMOTHERAPY RECORDS: 1.8
CHEMOTHERAPY RECORDS: 4500
CHEMOTHERAPY RECORDS: NORMAL
CHEMOTHERAPY RX CANCER: NORMAL
DATE 1ST CHEMO CANCER: NORMAL
RAD ONC ARIA COURSE LAST TREATMENT DATE: NORMAL
RAD ONC ARIA COURSE TREATMENT ELAPSED DAYS: NORMAL
RAD ONC ARIA REFERENCE POINT DOSAGE GIVEN TO DATE: 39.6
RAD ONC ARIA REFERENCE POINT DOSAGE GIVEN TO DATE: 40.59
RAD ONC ARIA REFERENCE POINT ID: NORMAL
RAD ONC ARIA REFERENCE POINT ID: NORMAL
RAD ONC ARIA REFERENCE POINT SESSION DOSAGE GIVEN: 1.8
RAD ONC ARIA REFERENCE POINT SESSION DOSAGE GIVEN: 1.84

## 2020-04-28 PROCEDURE — 77386 HCHG IMRT DELIVERY COMPLEX: CPT | Performed by: RADIOLOGY

## 2020-04-28 PROCEDURE — 77014 PR CT GUIDANCE PLACEMENT RAD THERAPY FIELDS: CPT | Mod: 26 | Performed by: RADIOLOGY

## 2020-04-28 RX ORDER — TEMAZEPAM 22.5 MG/1
22.5 CAPSULE ORAL NIGHTLY PRN
Qty: 30 CAP | Refills: 2 | Status: SHIPPED
Start: 2020-04-28 | End: 2020-07-27

## 2020-04-28 NOTE — TELEPHONE ENCOUNTER
SHERINE Neal.  Kristen Stewart, Med Ass't; Juliet Martin P.A.-C.             Compliance looks good.   Is he having any symptoms using his device like gasping/shortness of breath?   If not, could attempt increasing temazepam to next dose to see if this helps. Otherwise discuss sleep issues with oncology also - could be affect of chemo/radiation. Consider CNOX on ASV.   See prior messages in chart regarding insomnia and temazepam dosing.

## 2020-04-28 NOTE — TELEPHONE ENCOUNTER
Can we get compliance download of last 2 months (using [employer]ironics)?  How long has he noticed initiating sleep to be worse?  Is his temazepam does 15mg still?  Has he talked to oncologist about this? It is common to have sleep issues when undergoing chemo/radiation?

## 2020-04-28 NOTE — TELEPHONE ENCOUNTER
Pt returned my call back. He said No, he hasn't had any symptom of gasping/SOB on his device.  He talked with his oncologist, Naila SEGUAR(she works with Dr Bates). Thinks it would be good idea for the temazepam.   Pt said he noticed this initiating sleep to be worse when he started chemo. Possible from the steroids per pt.  Yes, pt said he is still on the Temazepam 15mg.        Please advise

## 2020-04-29 ENCOUNTER — APPOINTMENT (OUTPATIENT)
Dept: RADIATION ONCOLOGY | Facility: MEDICAL CENTER | Age: 65
End: 2020-04-29
Payer: COMMERCIAL

## 2020-04-29 VITALS
TEMPERATURE: 97.8 F | SYSTOLIC BLOOD PRESSURE: 131 MMHG | BODY MASS INDEX: 26.73 KG/M2 | DIASTOLIC BLOOD PRESSURE: 75 MMHG | HEART RATE: 120 BPM | OXYGEN SATURATION: 95 % | WEIGHT: 208.2 LBS

## 2020-04-29 LAB
CHEMOTHERAPY INFUSION START DATE: NORMAL
CHEMOTHERAPY RECORDS: 1.8
CHEMOTHERAPY RECORDS: 4500
CHEMOTHERAPY RECORDS: NORMAL
CHEMOTHERAPY RX CANCER: NORMAL
DATE 1ST CHEMO CANCER: NORMAL
RAD ONC ARIA COURSE LAST TREATMENT DATE: NORMAL
RAD ONC ARIA COURSE TREATMENT ELAPSED DAYS: NORMAL
RAD ONC ARIA REFERENCE POINT DOSAGE GIVEN TO DATE: 41.4
RAD ONC ARIA REFERENCE POINT DOSAGE GIVEN TO DATE: 42.43
RAD ONC ARIA REFERENCE POINT ID: NORMAL
RAD ONC ARIA REFERENCE POINT ID: NORMAL
RAD ONC ARIA REFERENCE POINT SESSION DOSAGE GIVEN: 1.8
RAD ONC ARIA REFERENCE POINT SESSION DOSAGE GIVEN: 1.84

## 2020-04-29 PROCEDURE — 77386 HCHG IMRT DELIVERY COMPLEX: CPT | Performed by: RADIOLOGY

## 2020-04-29 PROCEDURE — 77014 PR CT GUIDANCE PLACEMENT RAD THERAPY FIELDS: CPT | Mod: 26 | Performed by: RADIOLOGY

## 2020-04-29 PROCEDURE — 77336 RADIATION PHYSICS CONSULT: CPT | Performed by: RADIOLOGY

## 2020-04-29 ASSESSMENT — PAIN SCALES - GENERAL
PAINLEVEL: NO PAIN
PAINLEVEL: NO PAIN

## 2020-04-29 ASSESSMENT — FIBROSIS 4 INDEX: FIB4 SCORE: 1.45

## 2020-04-29 NOTE — ON TREATMENT VISIT
ON TREATMENT  NOTE  RADIATION ONCOLOGY DEPARTMENT    Patient name:  Norberto White    Primary Physician:  Alhaji Fenton M.D. MRN: [id number]  [facility]: [id number]   Referring physician:  Jerry Bates M.D. : 1955, 64 y.o.     ENCOUNTER DATE:  20    DIAGNOSIS:  Malignant neoplasm of lower third of esophagus (HCC)  Staging form: Esophagus - Adenocarcinoma, AJCC 8th Edition  - Clinical: Stage III (cT3, cN0, cM0, G3) - Signed by Abelardo Arias M.D. on 3/16/2020  Total positive nodes: 0  Histologic grading system: 3 grade system      TREATMENT SUMMARY:  Aria Treatment Information        Some values may be hidden. Unless noted otherwise, only the newest values recorded on each date are displayed.         Aria Treatment Summary 20   Course First Treatment Date 2020   Course Last Treatment Date 2020   Esophagus Plan from Course C1_Esophagus   Fraction 23 of 25   Elapsed Course Days  @    Prescribed Fraction Dose 180 cGy   Prescribed Total Dose 4,500 cGy   Esophagus cp Reference Point from Course C1_Esophagus   Elapsed Course Days  @ [id number]   Session Dose 184 cGy   Total Dose 4,243 cGy   PTV45 Reference Point from Course C1_Esophagus   Elapsed Course Days  @    Session Dose 180 cGy   Total Dose 4,140 cGy             SUBJECTIVE:  Well appearing, jtube working now      VITAL SIGNS:  KPS: 100, Fully active, able to carry on all pre-disease performed without restriction (ECOG equivalent 0)  Encounter Vitals  Temperature: 36.6 °C (97.8 °F)  Blood Pressure: 131/75  Pulse: (!) 120  Pulse Oximetry: 95 %  Weight: 94.4 kg (208 lb 3.2 oz)  Pain Score: No pain  Pain Assessment 2020   Pain Assessment Denies Pain - Acute Pain   Pain Score 0 0 1   Pain Loc - - Throat   What increases pain? - - swallowing   Describe pain - - Burning   Some recent data might be hidden          PHYSICAL EXAM:    Physical Exam  Constitutional:       Appearance:  Normal appearance.   Abdominal:      General: There is no distension.      Palpations: Abdomen is soft.   Skin:     Findings: No erythema.   Neurological:      Mental Status: He is alert.          Toxicity Assessment 4/29/2020 4/22/2020 4/8/2020 4/1/2020   Toxicity Assessment Chest Chest Abdomen Abdomen   Fatigue (lethargy, malaise, asthenia) Increased fatigue over baseline, but not altering normal activities Moderate (e.g., decrease in performance status by 1 ECOG level or 20% Karnofsky) or causing difficulty performing some activities Increased fatigue over baseline, but not altering normal activities None   Radiation Dermatitis None None None None   Anorexia Requiring feeding tube or parenteral nutrition Loss of appetite None Loss of appetite   Dehydration - - None None   Diarrhea w/o Colostomy - - None None   Nausea - - None None   Vomiting - - None None   Dyspepsia/heartburn - None None None   Dysphagia, Esophagitis, odynophagoa (painful swallowing) - Dysphagia, requiring predominantly pureed, soft, or liquid diet Mild dysphagia, but can eat regular diet Mild dysphagia, but can eat regular diet   Gastritis - - None None   RT Dysphagia-Esophageal (No Data) Dysphagia, requiring feeding tube, IV hydration or hyperlimentation - -   Abdominal Pain or cramping - - Mild pain not interfering with function None   Cough Absent Mild, relieved by non-prescription medication - -   Dyspnea Normal Normal - -       CURRENT MEDICATIONS:    Current Outpatient Medications:   •  temazepam (RESTORIL) 22.5 MG capsule, Take 1 Cap by mouth at bedtime as needed for Sleep for up to 90 days., Disp: 30 Cap, Rfl: 2  •  lidocaine (XYLOCAINE) 2 % Solution, Mix with equal part Maalox. 1 Tbsp q 4 hours prn mouth pain., Disp: 200 mL, Rfl: 3  •  atorvastatin (LIPITOR) 80 MG tablet, Take 80 mg by mouth every evening., Disp: , Rfl:   •  doxazosin (CARDURA) 4 MG Tab, Take 4 mg by mouth every day., Disp: , Rfl:   •  ezetimibe (ZETIA) 10 MG Tab, Take  10 mg by mouth every day., Disp: , Rfl:   •  fluticasone-salmeterol (ADVAIR) 500-50 MCG/DOSE AEROSOL POWDER, BREATH ACTIVATED, Inhale 1 Puff by mouth every 12 hours., Disp: , Rfl:   •  furosemide (LASIX) 20 MG Tab, Take 20 mg by mouth every day., Disp: , Rfl:   •  potassium chloride SA (K-DUR) 10 MEQ Tab CR, Take 10 mEq by mouth 2 times a day., Disp: , Rfl:   •  tiotropium (SPIRIVA) 18 MCG Cap, Inhale 18 mcg by mouth every day., Disp: , Rfl:   •  temazepam (RESTORIL) 15 MG Cap, Take 15 mg by mouth every bedtime., Disp: , Rfl:   •  ondansetron (ZOFRAN ODT) 8 MG TABLET DISPERSIBLE, Take 8 mg by mouth every 12 hours as needed for Nausea., Disp: , Rfl:   •  prochlorperazine (COMPAZINE) 10 MG Tab, Take 10 mg by mouth every 8 hours as needed for Nausea/Vomiting., Disp: , Rfl:   •  BRILINTA 90 MG Tab tablet, Take 90 mg by mouth 2 Times a Day., Disp: , Rfl:   •  metoprolol SR (TOPROL XL) 25 MG TABLET SR 24 HR, Take 25 mg by mouth every day., Disp: , Rfl:   •  benazepril (LOTENSIN) 20 MG Tab, Take 1 Tab by mouth every day., Disp: 90 Tab, Rfl: 3  •  nitroglycerin (NITROSTAT) 0.4 MG SL Tab, Place 1 Tab under tongue as needed for Chest Pain (Place 1 tablet under the tongue every 5 minutes up to 3 doses as needed for chest pain)., Disp: 75 Tab, Rfl: 3  •  omeprazole (PRILOSEC) 40 MG delayed-release capsule, Take 40 mg by mouth every day., Disp: , Rfl:   •  levothyroxine (SYNTHROID) 75 MCG Tab, Take 75 mcg by mouth Every morning on an empty stomach., Disp: , Rfl:   •  Melatonin 10 MG Tab, Take 10 mg by mouth every bedtime., Disp: , Rfl:   •  Cranberry 500 MG Cap, Take 1 Cap by mouth every day., Disp: , Rfl:   •  Zinc 50 MG Cap, Take 50 mg by mouth every day., Disp: , Rfl:   •  diphenhydrAMINE (BENADRYL) 25 MG Tab, Take 25 mg by mouth every bedtime., Disp: , Rfl:   •  aspirin EC (ECOTRIN) 81 MG TBEC, Take 81 mg by mouth every day., Disp: , Rfl:   •  tamsulosin (FLOMAX) 0.4 MG capsule, Take 0.4 mg by mouth ONE-HALF HOUR AFTER  BREAKFAST., Disp: , Rfl:     LABORATORY DATA:   Lab Results   Component Value Date/Time    SODIUM 133 (L) 04/24/2020 11:33 AM    POTASSIUM 4.0 04/24/2020 11:33 AM    CHLORIDE 99 04/24/2020 11:33 AM    CO2 22 04/24/2020 11:33 AM    GLUCOSE 126 (H) 04/24/2020 11:33 AM    BUN 16 04/24/2020 11:33 AM    CREATININE 0.76 04/24/2020 11:33 AM         Lab Results   Component Value Date/Time    WBC 6.2 04/16/2020 01:57 PM    HEMOGLOBIN 15.4 04/16/2020 01:57 PM    HEMATOCRIT 44.7 04/16/2020 01:57 PM    MCV 91.6 04/16/2020 01:57 PM    PLATELETCT 191 04/16/2020 01:57 PM        RADIOLOGY DATA:  No results found.    IMPRESSION:  Malignant neoplasm of lower third of esophagus (HCC)  Staging form: Esophagus - Adenocarcinoma, AJCC 8th Edition  - Clinical: Stage III (cT3, cN0, cM0, G3) - Signed by Abelardo Arias M.D. on 3/16/2020  Total positive nodes: 0  Histologic grading system: 3 grade system      PLAN:  No change in treatment plan    Disposition:  Treatment plan reviewed. Questions answered. Continue therapy outlined.     Abelardo Arias M.D.    Orders Placed This Encounter   • Rad Onc Aria Session Summary

## 2020-04-30 ENCOUNTER — HOSPITAL ENCOUNTER (OUTPATIENT)
Dept: RADIATION ONCOLOGY | Facility: MEDICAL CENTER | Age: 65
End: 2020-04-30
Payer: COMMERCIAL

## 2020-04-30 LAB
CHEMOTHERAPY INFUSION START DATE: NORMAL
CHEMOTHERAPY RECORDS: 1.8
CHEMOTHERAPY RECORDS: 4500
CHEMOTHERAPY RECORDS: NORMAL
CHEMOTHERAPY RX CANCER: NORMAL
DATE 1ST CHEMO CANCER: NORMAL
RAD ONC ARIA COURSE LAST TREATMENT DATE: NORMAL
RAD ONC ARIA COURSE TREATMENT ELAPSED DAYS: NORMAL
RAD ONC ARIA REFERENCE POINT DOSAGE GIVEN TO DATE: 43.2
RAD ONC ARIA REFERENCE POINT DOSAGE GIVEN TO DATE: 44.28
RAD ONC ARIA REFERENCE POINT ID: NORMAL
RAD ONC ARIA REFERENCE POINT ID: NORMAL
RAD ONC ARIA REFERENCE POINT SESSION DOSAGE GIVEN: 1.8
RAD ONC ARIA REFERENCE POINT SESSION DOSAGE GIVEN: 1.84

## 2020-04-30 PROCEDURE — 77014 PR CT GUIDANCE PLACEMENT RAD THERAPY FIELDS: CPT | Mod: 26 | Performed by: RADIOLOGY

## 2020-04-30 PROCEDURE — 77386 HCHG IMRT DELIVERY COMPLEX: CPT | Performed by: RADIOLOGY

## 2020-05-01 ENCOUNTER — HOSPITAL ENCOUNTER (OUTPATIENT)
Dept: RADIATION ONCOLOGY | Facility: MEDICAL CENTER | Age: 65
End: 2020-05-01
Payer: COMMERCIAL

## 2020-05-01 ENCOUNTER — HOSPITAL ENCOUNTER (OUTPATIENT)
Dept: RADIATION ONCOLOGY | Facility: MEDICAL CENTER | Age: 65
End: 2020-05-31
Attending: RADIOLOGY
Payer: COMMERCIAL

## 2020-05-01 ENCOUNTER — HOSPITAL ENCOUNTER (OUTPATIENT)
Dept: LAB | Facility: MEDICAL CENTER | Age: 65
End: 2020-05-01
Attending: INTERNAL MEDICINE
Payer: COMMERCIAL

## 2020-05-01 LAB
ALBUMIN SERPL BCP-MCNC: 3.4 G/DL (ref 3.2–4.9)
ALBUMIN/GLOB SERPL: 1.2 G/DL
ALP SERPL-CCNC: 64 U/L (ref 30–99)
ALT SERPL-CCNC: 22 U/L (ref 2–50)
ANION GAP SERPL CALC-SCNC: 13 MMOL/L (ref 7–16)
AST SERPL-CCNC: 19 U/L (ref 12–45)
BILIRUB SERPL-MCNC: 1.3 MG/DL (ref 0.1–1.5)
BUN SERPL-MCNC: 16 MG/DL (ref 8–22)
CALCIUM SERPL-MCNC: 8.6 MG/DL (ref 8.5–10.5)
CHEMOTHERAPY INFUSION START DATE: NORMAL
CHEMOTHERAPY RECORDS: 1.8
CHEMOTHERAPY RECORDS: 4500
CHEMOTHERAPY RECORDS: NORMAL
CHEMOTHERAPY RX CANCER: NORMAL
CHLORIDE SERPL-SCNC: 101 MMOL/L (ref 96–112)
CO2 SERPL-SCNC: 21 MMOL/L (ref 20–33)
CREAT SERPL-MCNC: 0.71 MG/DL (ref 0.5–1.4)
DATE 1ST CHEMO CANCER: NORMAL
GLOBULIN SER CALC-MCNC: 2.9 G/DL (ref 1.9–3.5)
GLUCOSE SERPL-MCNC: 135 MG/DL (ref 65–99)
POTASSIUM SERPL-SCNC: 4.3 MMOL/L (ref 3.6–5.5)
PROT SERPL-MCNC: 6.3 G/DL (ref 6–8.2)
RAD ONC ARIA COURSE LAST TREATMENT DATE: NORMAL
RAD ONC ARIA COURSE TREATMENT ELAPSED DAYS: NORMAL
RAD ONC ARIA REFERENCE POINT DOSAGE GIVEN TO DATE: 45
RAD ONC ARIA REFERENCE POINT DOSAGE GIVEN TO DATE: 46.12
RAD ONC ARIA REFERENCE POINT ID: NORMAL
RAD ONC ARIA REFERENCE POINT ID: NORMAL
RAD ONC ARIA REFERENCE POINT SESSION DOSAGE GIVEN: 1.8
RAD ONC ARIA REFERENCE POINT SESSION DOSAGE GIVEN: 1.84
SODIUM SERPL-SCNC: 135 MMOL/L (ref 135–145)

## 2020-05-01 PROCEDURE — 80053 COMPREHEN METABOLIC PANEL: CPT

## 2020-05-01 PROCEDURE — 77014 PR CT GUIDANCE PLACEMENT RAD THERAPY FIELDS: CPT | Mod: 26 | Performed by: RADIOLOGY

## 2020-05-01 PROCEDURE — 77427 RADIATION TX MANAGEMENT X5: CPT | Performed by: RADIOLOGY

## 2020-05-01 PROCEDURE — 77386 HCHG IMRT DELIVERY COMPLEX: CPT | Performed by: RADIOLOGY

## 2020-05-01 PROCEDURE — 36415 COLL VENOUS BLD VENIPUNCTURE: CPT

## 2020-05-04 ENCOUNTER — HOSPITAL ENCOUNTER (OUTPATIENT)
Dept: RADIATION ONCOLOGY | Facility: MEDICAL CENTER | Age: 65
End: 2020-05-04
Payer: COMMERCIAL

## 2020-05-04 LAB
CHEMOTHERAPY INFUSION START DATE: NORMAL
CHEMOTHERAPY RECORDS: 1.8
CHEMOTHERAPY RECORDS: 540
CHEMOTHERAPY RECORDS: NORMAL
CHEMOTHERAPY RX CANCER: NORMAL
DATE 1ST CHEMO CANCER: NORMAL
RAD ONC ARIA COURSE LAST TREATMENT DATE: NORMAL
RAD ONC ARIA COURSE TREATMENT ELAPSED DAYS: NORMAL
RAD ONC ARIA REFERENCE POINT DOSAGE GIVEN TO DATE: 1.8
RAD ONC ARIA REFERENCE POINT DOSAGE GIVEN TO DATE: 1.83
RAD ONC ARIA REFERENCE POINT ID: NORMAL
RAD ONC ARIA REFERENCE POINT ID: NORMAL
RAD ONC ARIA REFERENCE POINT SESSION DOSAGE GIVEN: 1.8
RAD ONC ARIA REFERENCE POINT SESSION DOSAGE GIVEN: 1.83

## 2020-05-04 PROCEDURE — 77014 PR CT GUIDANCE PLACEMENT RAD THERAPY FIELDS: CPT | Mod: 26 | Performed by: RADIOLOGY

## 2020-05-04 PROCEDURE — 77386 HCHG IMRT DELIVERY COMPLEX: CPT | Performed by: RADIOLOGY

## 2020-05-05 ENCOUNTER — HOSPITAL ENCOUNTER (OUTPATIENT)
Dept: RADIATION ONCOLOGY | Facility: MEDICAL CENTER | Age: 65
End: 2020-05-05
Payer: COMMERCIAL

## 2020-05-05 ENCOUNTER — DOCUMENTATION (OUTPATIENT)
Dept: NUTRITION | Facility: MEDICAL CENTER | Age: 65
End: 2020-05-05

## 2020-05-05 LAB
CHEMOTHERAPY INFUSION START DATE: NORMAL
CHEMOTHERAPY RECORDS: 1.8
CHEMOTHERAPY RECORDS: 540
CHEMOTHERAPY RECORDS: NORMAL
CHEMOTHERAPY RX CANCER: NORMAL
DATE 1ST CHEMO CANCER: NORMAL
RAD ONC ARIA COURSE LAST TREATMENT DATE: NORMAL
RAD ONC ARIA COURSE TREATMENT ELAPSED DAYS: NORMAL
RAD ONC ARIA REFERENCE POINT DOSAGE GIVEN TO DATE: 3.6
RAD ONC ARIA REFERENCE POINT DOSAGE GIVEN TO DATE: 3.66
RAD ONC ARIA REFERENCE POINT ID: NORMAL
RAD ONC ARIA REFERENCE POINT ID: NORMAL
RAD ONC ARIA REFERENCE POINT SESSION DOSAGE GIVEN: 1.8
RAD ONC ARIA REFERENCE POINT SESSION DOSAGE GIVEN: 1.83

## 2020-05-05 PROCEDURE — 77386 HCHG IMRT DELIVERY COMPLEX: CPT | Performed by: RADIOLOGY

## 2020-05-05 PROCEDURE — 77014 PR CT GUIDANCE PLACEMENT RAD THERAPY FIELDS: CPT | Mod: 26 | Performed by: RADIOLOGY

## 2020-05-05 NOTE — PROGRESS NOTES
Nutrition Services: Brief Update  Weight: 208 lbs, weighed on 4/29  Weight History:  210 lbs on 4/22/20  211 lbs on 4/15/20  221 lbs on 4/8/20  227 lbs on 4/1/20    Weight Change: Pt weight down 2 lbs within past 1 week, which is a ~1% loss, which is not significant.     RD able to visit pt following Xrt. Pt states is feeling good and is visibly in good spirits. States has last chemotherapy today. Mentions things have been going very well in regards to pump infusion. Shows RD that is infusing at 130 mL/hr. States now is administering 5-6 cartons of Pivot 1.5 per day and tolerating well. Pt mentions not knowing the exact routine that daughter, Robert, is using to administer formula, though believes he is getting 2 in the morning and afternoon at the very least. Mentions pump backpack is now completely falling apart and is hoping to be provided with another one. Also requests additional universal securement devices as will be moving back to [place] given Wednesday is his last treatment day. Requests to show RD some products he purchased online, such as plastic decloggers he was able to get on [employer]. Denies additional questions and concerns at this time.     Plan/Recommend:  • RD applauded pt for consistent efforts in increasing amount of formula via pump. Reviewed further increasing to goal of 7 cartons of Pivot 1.5 as tolerated. Will follow-up via phone call post-treatment.   • Provided pt with some universal securement devices, though limited supply available in stock. Will provide additional tomorrow.   • Pt provided RD with new address in [place]. RD to provide updated information to [facility] to ensure continuation of care with minimal difficulty as able. RD called [employer] and left detailed voicemessage, also requesting new pump backpack for pt.     RD to continue to monitor throughout treatment.  Please contact [phone]

## 2020-05-06 ENCOUNTER — APPOINTMENT (OUTPATIENT)
Dept: RADIATION ONCOLOGY | Facility: MEDICAL CENTER | Age: 65
End: 2020-05-06
Payer: COMMERCIAL

## 2020-05-06 ENCOUNTER — HOSPITAL ENCOUNTER (OUTPATIENT)
Dept: LAB | Facility: MEDICAL CENTER | Age: 65
End: 2020-05-06
Attending: INTERNAL MEDICINE
Payer: COMMERCIAL

## 2020-05-06 ENCOUNTER — APPOINTMENT (OUTPATIENT)
Dept: LAB | Facility: MEDICAL CENTER | Age: 65
End: 2020-05-06
Payer: COMMERCIAL

## 2020-05-06 VITALS
DIASTOLIC BLOOD PRESSURE: 66 MMHG | SYSTOLIC BLOOD PRESSURE: 103 MMHG | BODY MASS INDEX: 26.77 KG/M2 | TEMPERATURE: 98 F | OXYGEN SATURATION: 96 % | HEART RATE: 115 BPM | WEIGHT: 208.5 LBS

## 2020-05-06 LAB
AMORPH CRY #/AREA URNS HPF: PRESENT /HPF
APPEARANCE UR: ABNORMAL
BACTERIA #/AREA URNS HPF: NEGATIVE /HPF
BILIRUB UR QL STRIP.AUTO: NEGATIVE
CHEMOTHERAPY INFUSION START DATE: NORMAL
CHEMOTHERAPY RECORDS: 1.8
CHEMOTHERAPY RECORDS: 540
CHEMOTHERAPY RECORDS: NORMAL
CHEMOTHERAPY RX CANCER: NORMAL
COLOR UR: ABNORMAL
DATE 1ST CHEMO CANCER: NORMAL
EPI CELLS #/AREA URNS HPF: NEGATIVE /HPF
GLUCOSE UR STRIP.AUTO-MCNC: NEGATIVE MG/DL
HYALINE CASTS #/AREA URNS LPF: ABNORMAL /LPF
KETONES UR STRIP.AUTO-MCNC: ABNORMAL MG/DL
LEUKOCYTE ESTERASE UR QL STRIP.AUTO: ABNORMAL
MICRO URNS: ABNORMAL
NITRITE UR QL STRIP.AUTO: NEGATIVE
PH UR STRIP.AUTO: 6 [PH] (ref 5–8)
PROT UR QL STRIP: 30 MG/DL
RAD ONC ARIA COURSE LAST TREATMENT DATE: NORMAL
RAD ONC ARIA COURSE TREATMENT ELAPSED DAYS: NORMAL
RAD ONC ARIA REFERENCE POINT DOSAGE GIVEN TO DATE: 5.4
RAD ONC ARIA REFERENCE POINT DOSAGE GIVEN TO DATE: 5.49
RAD ONC ARIA REFERENCE POINT ID: NORMAL
RAD ONC ARIA REFERENCE POINT ID: NORMAL
RAD ONC ARIA REFERENCE POINT SESSION DOSAGE GIVEN: 1.8
RAD ONC ARIA REFERENCE POINT SESSION DOSAGE GIVEN: 1.83
RBC # URNS HPF: ABNORMAL /HPF
RBC UR QL AUTO: ABNORMAL
SP GR UR STRIP.AUTO: 1.02
UROBILINOGEN UR STRIP.AUTO-MCNC: 1 MG/DL
WBC #/AREA URNS HPF: ABNORMAL /HPF

## 2020-05-06 PROCEDURE — 77336 RADIATION PHYSICS CONSULT: CPT | Performed by: RADIOLOGY

## 2020-05-06 PROCEDURE — 77427 RADIATION TX MANAGEMENT X5: CPT | Performed by: RADIOLOGY

## 2020-05-06 PROCEDURE — 81001 URINALYSIS AUTO W/SCOPE: CPT

## 2020-05-06 PROCEDURE — 77386 HCHG IMRT DELIVERY COMPLEX: CPT | Performed by: RADIOLOGY

## 2020-05-06 PROCEDURE — 77014 PR CT GUIDANCE PLACEMENT RAD THERAPY FIELDS: CPT | Mod: 26 | Performed by: RADIOLOGY

## 2020-05-06 ASSESSMENT — PAIN SCALES - GENERAL: PAINLEVEL: 2=MINIMAL-SLIGHT

## 2020-05-06 ASSESSMENT — FIBROSIS 4 INDEX: FIB4 SCORE: 1.34

## 2020-05-06 NOTE — ON TREATMENT VISIT
ON TREATMENT  NOTE  RADIATION ONCOLOGY DEPARTMENT    Patient name:  Norberto White    Primary Physician:  Alhaji Fneton M.D. MRN: [id number]  CSN: [id number]   Referring physician:  Jerry Bates M.D. : 1955, 64 y.o.     ENCOUNTER DATE:  20    DIAGNOSIS:  Malignant neoplasm of lower third of esophagus (HCC)  Staging form: Esophagus - Adenocarcinoma, AJCC 8th Edition  - Clinical: Stage III (cT3, cN0, cM0, G3) - Signed by Abelardo Arias M.D. on 3/16/2020  Total positive nodes: 0  Histologic grading system: 3 grade system      TREATMENT SUMMARY:  Aria Treatment Information        Some values may be hidden. Unless noted otherwise, only the newest values recorded on each date are displayed.         Aria Treatment Summary 20   Course First Treatment Date 2020   Course Last Treatment Date 2020   Esophagus Plan from Course C1_Esophagus   EsophagusBst Plan from Course C1_Esophagus   Fraction 3 of 3   Elapsed Course Days  @    Prescribed Fraction Dose 180 cGy   Prescribed Total Dose 540 cGy   Boost cp Reference Point from Course C1_Esophagus   Elapsed Course Days  @    Session Dose 183 cGy   Total Dose 549 cGy   Esophagus cp Reference Point from Course C1_Esophagus   PTV45 Reference Point from Course C1_Esophagus   PTV50.4 Reference Point from Course C1_Esophagus   Elapsed Course Days    Session Dose 180 cGy   Total Dose 540 cGy             SUBJECTIVE:  Moderate esophagitis      VITAL SIGNS:  KPS: 100, Fully active, able to carry on all pre-disease performed without restriction (ECOG equivalent 0)  Encounter Vitals  Temperature: 36.7 °C (98 °F)  Blood Pressure: 103/66  Pulse: (!) 115  Pulse Oximetry: 96 %  Weight: 94.6 kg (208 lb 8 oz)  Pain Score: 2=Minimal-Slight  Pain Assessment 2020   Pain Assessment - Denies Pain - Acute Pain   Pain Score 2 0 0 1   Pain Loc - - - Throat   What increases pain? - - -  swallowing   Describe pain - - - Burning   Some recent data might be hidden          PHYSICAL EXAM:    Physical Exam  Constitutional:       Appearance: Normal appearance.   Abdominal:      General: There is no distension.      Palpations: Abdomen is soft.   Skin:     Findings: No erythema.   Neurological:      Mental Status: He is alert.          Toxicity Assessment 5/6/2020 4/29/2020 4/22/2020 4/8/2020 4/1/2020   Toxicity Assessment Abdomen Chest Chest Abdomen Abdomen   Fatigue (lethargy, malaise, asthenia) Moderate (e.g., decrease in performance status by 1 ECOG level or 20% Karnofsky) or causing difficulty performing some activities Increased fatigue over baseline, but not altering normal activities Moderate (e.g., decrease in performance status by 1 ECOG level or 20% Karnofsky) or causing difficulty performing some activities Increased fatigue over baseline, but not altering normal activities None   Radiation Dermatitis None None None None None   Anorexia Requiring feeding tube or parenteral nutrition Requiring feeding tube or parenteral nutrition Loss of appetite None Loss of appetite   Dehydration None - - None None   Diarrhea w/o Colostomy None - - None None   Nausea None - - None None   Vomiting None - - None None   Dyspepsia/heartburn Moderate - None None None   Dysphagia, Esophagitis, odynophagoa (painful swallowing) (No Data) - Dysphagia, requiring predominantly pureed, soft, or liquid diet Mild dysphagia, but can eat regular diet Mild dysphagia, but can eat regular diet   Gastritis None - - None None   RT Dysphagia-Esophageal - (No Data) Dysphagia, requiring feeding tube, IV hydration or hyperlimentation - -   Abdominal Pain or cramping None - - Mild pain not interfering with function None   Cough - Absent Mild, relieved by non-prescription medication - -   Dyspnea - Normal Normal - -       CURRENT MEDICATIONS:    Current Outpatient Medications:   •  temazepam (RESTORIL) 22.5 MG capsule, Take 1 Cap by  mouth at bedtime as needed for Sleep for up to 90 days., Disp: 30 Cap, Rfl: 2  •  lidocaine (XYLOCAINE) 2 % Solution, Mix with equal part Maalox. 1 Tbsp q 4 hours prn mouth pain., Disp: 200 mL, Rfl: 3  •  atorvastatin (LIPITOR) 80 MG tablet, Take 80 mg by mouth every evening., Disp: , Rfl:   •  doxazosin (CARDURA) 4 MG Tab, Take 4 mg by mouth every day., Disp: , Rfl:   •  ezetimibe (ZETIA) 10 MG Tab, Take 10 mg by mouth every day., Disp: , Rfl:   •  fluticasone-salmeterol (ADVAIR) 500-50 MCG/DOSE AEROSOL POWDER, BREATH ACTIVATED, Inhale 1 Puff by mouth every 12 hours., Disp: , Rfl:   •  furosemide (LASIX) 20 MG Tab, Take 20 mg by mouth every day., Disp: , Rfl:   •  potassium chloride SA (K-DUR) 10 MEQ Tab CR, Take 10 mEq by mouth 2 times a day., Disp: , Rfl:   •  tiotropium (SPIRIVA) 18 MCG Cap, Inhale 18 mcg by mouth every day., Disp: , Rfl:   •  temazepam (RESTORIL) 15 MG Cap, Take 15 mg by mouth every bedtime., Disp: , Rfl:   •  ondansetron (ZOFRAN ODT) 8 MG TABLET DISPERSIBLE, Take 8 mg by mouth every 12 hours as needed for Nausea., Disp: , Rfl:   •  prochlorperazine (COMPAZINE) 10 MG Tab, Take 10 mg by mouth every 8 hours as needed for Nausea/Vomiting., Disp: , Rfl:   •  BRILINTA 90 MG Tab tablet, Take 90 mg by mouth 2 Times a Day., Disp: , Rfl:   •  metoprolol SR (TOPROL XL) 25 MG TABLET SR 24 HR, Take 25 mg by mouth every day., Disp: , Rfl:   •  benazepril (LOTENSIN) 20 MG Tab, Take 1 Tab by mouth every day., Disp: 90 Tab, Rfl: 3  •  nitroglycerin (NITROSTAT) 0.4 MG SL Tab, Place 1 Tab under tongue as needed for Chest Pain (Place 1 tablet under the tongue every 5 minutes up to 3 doses as needed for chest pain)., Disp: 75 Tab, Rfl: 3  •  omeprazole (PRILOSEC) 40 MG delayed-release capsule, Take 40 mg by mouth every day., Disp: , Rfl:   •  levothyroxine (SYNTHROID) 75 MCG Tab, Take 75 mcg by mouth Every morning on an empty stomach., Disp: , Rfl:   •  Melatonin 10 MG Tab, Take 10 mg by mouth every bedtime.,  Disp: , Rfl:   •  Cranberry 500 MG Cap, Take 1 Cap by mouth every day., Disp: , Rfl:   •  Zinc 50 MG Cap, Take 50 mg by mouth every day., Disp: , Rfl:   •  diphenhydrAMINE (BENADRYL) 25 MG Tab, Take 25 mg by mouth every bedtime., Disp: , Rfl:   •  aspirin EC (ECOTRIN) 81 MG TBEC, Take 81 mg by mouth every day., Disp: , Rfl:   •  tamsulosin (FLOMAX) 0.4 MG capsule, Take 0.4 mg by mouth ONE-HALF HOUR AFTER BREAKFAST., Disp: , Rfl:     LABORATORY DATA:   Lab Results   Component Value Date/Time    SODIUM 135 05/01/2020 11:13 AM    POTASSIUM 4.3 05/01/2020 11:13 AM    CHLORIDE 101 05/01/2020 11:13 AM    CO2 21 05/01/2020 11:13 AM    GLUCOSE 135 (H) 05/01/2020 11:13 AM    BUN 16 05/01/2020 11:13 AM    CREATININE 0.71 05/01/2020 11:13 AM         Lab Results   Component Value Date/Time    WBC 6.2 04/16/2020 01:57 PM    HEMOGLOBIN 15.4 04/16/2020 01:57 PM    HEMATOCRIT 44.7 04/16/2020 01:57 PM    MCV 91.6 04/16/2020 01:57 PM    PLATELETCT 191 04/16/2020 01:57 PM          IMPRESSION:  Malignant neoplasm of lower third of esophagus (HCC)  Staging form: Esophagus - Adenocarcinoma, AJCC 8th Edition  - Clinical: Stage III (cT3, cN0, cM0, G3) - Signed by Abelardo Arias M.D. on 3/16/2020  Total positive nodes: 0  Histologic grading system: 3 grade system      PLAN:  No change in treatment plan    Disposition:  Treatment plan reviewed. Questions answered. Continue therapy outlined. Follow up in 3 months.    Abelardo Arias M.D.    Orders Placed This Encounter   • Rad Onc Aria Session Summary

## 2020-05-07 LAB
CHEMOTHERAPY INFUSION START DATE: NORMAL
CHEMOTHERAPY INFUSION STOP DATE: NORMAL
CHEMOTHERAPY RECORDS: 1.8
CHEMOTHERAPY RECORDS: 1.8
CHEMOTHERAPY RECORDS: 4500
CHEMOTHERAPY RECORDS: 540
CHEMOTHERAPY RECORDS: NORMAL
CHEMOTHERAPY RX CANCER: NORMAL
DATE 1ST CHEMO CANCER: NORMAL
RAD ONC ARIA COURSE LAST TREATMENT DATE: NORMAL
RAD ONC ARIA COURSE TREATMENT ELAPSED DAYS: NORMAL
RAD ONC ARIA REFERENCE POINT DOSAGE GIVEN TO DATE: 45
RAD ONC ARIA REFERENCE POINT DOSAGE GIVEN TO DATE: 46.12
RAD ONC ARIA REFERENCE POINT DOSAGE GIVEN TO DATE: 5.4
RAD ONC ARIA REFERENCE POINT DOSAGE GIVEN TO DATE: 5.49
RAD ONC ARIA REFERENCE POINT ID: NORMAL

## 2020-05-14 ENCOUNTER — TELEPHONE (OUTPATIENT)
Dept: NUTRITION | Facility: MEDICAL CENTER | Age: 65
End: 2020-05-14

## 2020-05-14 NOTE — TELEPHONE ENCOUNTER
Nutrition Services: Telephone Encounter  Pt called and left a voicemail stating that has only 4-5 Kang[employer] pump bags left and has not heard anything from [facility] regarding sending his supply. Mentions he also never received another pump backpack either.     RD called [facility]. They relayed my concerns to their [facility] team that handles this.     RD able to reach pt. Pt states he will be at [facility] to see the surgeon at 11AM. RD instructed pt to come by Radiation Oncology  after his appointment to  some additional Kang[employer] pump bags. Pt very appreciative, states has been using 1 bag per day as instructed per [facility] and [facility]. States still has enough formula. RD discussed number for [facility] team to continue tube feeding troubleshooting and care. RD sent this number to pt's email. Pt confirmed email per Epic chart is correct.     RD available PRN, will provide assistance as needed.  [phone]

## 2020-06-17 ENCOUNTER — OFFICE VISIT (OUTPATIENT)
Dept: ADMISSIONS | Facility: MEDICAL CENTER | Age: 65
DRG: 327 | End: 2020-06-17
Attending: SURGERY
Payer: COMMERCIAL

## 2020-06-17 DIAGNOSIS — Z01.812 PRE-OPERATIVE LABORATORY EXAMINATION: ICD-10-CM

## 2020-06-17 LAB
ANION GAP SERPL CALC-SCNC: 15 MMOL/L (ref 7–16)
BUN SERPL-MCNC: 20 MG/DL (ref 8–22)
CALCIUM SERPL-MCNC: 9.3 MG/DL (ref 8.5–10.5)
CHLORIDE SERPL-SCNC: 103 MMOL/L (ref 96–112)
CO2 SERPL-SCNC: 21 MMOL/L (ref 20–33)
COVID ORDER STATUS COVID19: NORMAL
CREAT SERPL-MCNC: 0.77 MG/DL (ref 0.5–1.4)
ERYTHROCYTE [DISTWIDTH] IN BLOOD BY AUTOMATED COUNT: 60.9 FL (ref 35.9–50)
GLUCOSE SERPL-MCNC: 124 MG/DL (ref 65–99)
HCT VFR BLD AUTO: 42.2 % (ref 42–52)
HGB BLD-MCNC: 13.7 G/DL (ref 14–18)
MCH RBC QN AUTO: 31.9 PG (ref 27–33)
MCHC RBC AUTO-ENTMCNC: 32.5 G/DL (ref 33.7–35.3)
MCV RBC AUTO: 98.1 FL (ref 81.4–97.8)
PLATELET # BLD AUTO: 211 K/UL (ref 164–446)
PMV BLD AUTO: 10.2 FL (ref 9–12.9)
POTASSIUM SERPL-SCNC: 4.5 MMOL/L (ref 3.6–5.5)
RBC # BLD AUTO: 4.3 M/UL (ref 4.7–6.1)
SODIUM SERPL-SCNC: 139 MMOL/L (ref 135–145)
WBC # BLD AUTO: 6.5 K/UL (ref 4.8–10.8)

## 2020-06-17 PROCEDURE — 36415 COLL VENOUS BLD VENIPUNCTURE: CPT

## 2020-06-17 PROCEDURE — C9803 HOPD COVID-19 SPEC COLLECT: HCPCS

## 2020-06-17 PROCEDURE — U0003 INFECTIOUS AGENT DETECTION BY NUCLEIC ACID (DNA OR RNA); SEVERE ACUTE RESPIRATORY SYNDROME CORONAVIRUS 2 (SARS-COV-2) (CORONAVIRUS DISEASE [COVID-19]), AMPLIFIED PROBE TECHNIQUE, MAKING USE OF HIGH THROUGHPUT TECHNOLOGIES AS DESCRIBED BY CMS-2020-01-R: HCPCS

## 2020-06-17 PROCEDURE — 80048 BASIC METABOLIC PNL TOTAL CA: CPT

## 2020-06-17 PROCEDURE — 85027 COMPLETE CBC AUTOMATED: CPT

## 2020-06-17 ASSESSMENT — FIBROSIS 4 INDEX: FIB4 SCORE: 1.36

## 2020-06-18 LAB
SARS-COV-2 RNA RESP QL NAA+PROBE: NOTDETECTED
SPECIMEN SOURCE: NORMAL

## 2020-06-21 NOTE — PROGRESS NOTES
COVID-19 Pre-surgery screenin. Do you have an undiagnosed respiratory illness or symptoms such as coughing or sneezing? (No, chronic cough for years)  a. Onset of Sx  b. Acute vs. chronic respiratory illness     2. Do you have an unexplained fever greater than 100.4 degrees Fahrenheit or 38 degrees Celsius?                     (No)     3. Have you had direct exposure to a patient who tested positive for Covid-19?                          (No)     4. Have you traveled outside [facility] in the last 14 days? (No)     5. Have you had any loss of your sense of taste or smell? Have you had N/V or sore throat? (No)     Patient has been informed of visitor policy and asked to wear a mask upon entering the hospital   (Yes)

## 2020-06-22 ENCOUNTER — APPOINTMENT (OUTPATIENT)
Dept: RADIOLOGY | Facility: MEDICAL CENTER | Age: 65
DRG: 327 | End: 2020-06-22
Attending: PHYSICIAN ASSISTANT
Payer: COMMERCIAL

## 2020-06-22 ENCOUNTER — ANESTHESIA EVENT (OUTPATIENT)
Dept: SURGERY | Facility: MEDICAL CENTER | Age: 65
DRG: 327 | End: 2020-06-22
Payer: COMMERCIAL

## 2020-06-22 ENCOUNTER — ANESTHESIA (OUTPATIENT)
Dept: SURGERY | Facility: MEDICAL CENTER | Age: 65
DRG: 327 | End: 2020-06-22
Payer: COMMERCIAL

## 2020-06-22 ENCOUNTER — HOSPITAL ENCOUNTER (INPATIENT)
Facility: MEDICAL CENTER | Age: 65
LOS: 4 days | DRG: 327 | End: 2020-06-26
Attending: SURGERY | Admitting: SURGERY
Payer: COMMERCIAL

## 2020-06-22 DIAGNOSIS — G89.18 POSTOPERATIVE PAIN: ICD-10-CM

## 2020-06-22 LAB
ABO GROUP BLD: NORMAL
BLD GP AB SCN SERPL QL: NORMAL
PATHOLOGY CONSULT NOTE: NORMAL
RH BLD: NORMAL

## 2020-06-22 PROCEDURE — A4314 CATH W/DRAINAGE 2-WAY LATEX: HCPCS | Performed by: SURGERY

## 2020-06-22 PROCEDURE — 700105 HCHG RX REV CODE 258: Performed by: PHYSICIAN ASSISTANT

## 2020-06-22 PROCEDURE — 501583 HCHG TROCAR, THRD CAN&SEAL 5X100: Performed by: SURGERY

## 2020-06-22 PROCEDURE — 160009 HCHG ANES TIME/MIN: Performed by: SURGERY

## 2020-06-22 PROCEDURE — 160035 HCHG PACU - 1ST 60 MINS PHASE I: Performed by: SURGERY

## 2020-06-22 PROCEDURE — 501571 HCHG TROCAR, SEPARATOR 12X100: Performed by: SURGERY

## 2020-06-22 PROCEDURE — 501576 HCHG TROCAR, SMTH CAN&SEAL12: Performed by: SURGERY

## 2020-06-22 PROCEDURE — 86900 BLOOD TYPING SEROLOGIC ABO: CPT

## 2020-06-22 PROCEDURE — A9270 NON-COVERED ITEM OR SERVICE: HCPCS | Performed by: SURGERY

## 2020-06-22 PROCEDURE — 500521 HCHG ENDOSTITCH LOAD UNIT: Performed by: SURGERY

## 2020-06-22 PROCEDURE — 501838 HCHG SUTURE GENERAL: Performed by: SURGERY

## 2020-06-22 PROCEDURE — 502652 HCHG STAPLES, ETHICON ECR60: Performed by: SURGERY

## 2020-06-22 PROCEDURE — 500522 HCHG ENDOSTITCH SUTURING DEVICE: Performed by: SURGERY

## 2020-06-22 PROCEDURE — 502571 HCHG PACK, LAP CHOLE: Performed by: SURGERY

## 2020-06-22 PROCEDURE — 700101 HCHG RX REV CODE 250: Performed by: ANESTHESIOLOGY

## 2020-06-22 PROCEDURE — 94760 N-INVAS EAR/PLS OXIMETRY 1: CPT

## 2020-06-22 PROCEDURE — 700105 HCHG RX REV CODE 258: Performed by: SURGERY

## 2020-06-22 PROCEDURE — 88309 TISSUE EXAM BY PATHOLOGIST: CPT

## 2020-06-22 PROCEDURE — 700102 HCHG RX REV CODE 250 W/ 637 OVERRIDE(OP)

## 2020-06-22 PROCEDURE — C9113 INJ PANTOPRAZOLE SODIUM, VIA: HCPCS | Performed by: PHYSICIAN ASSISTANT

## 2020-06-22 PROCEDURE — 07T70ZZ RESECTION OF THORAX LYMPHATIC, OPEN APPROACH: ICD-10-PCS | Performed by: SURGERY

## 2020-06-22 PROCEDURE — 700102 HCHG RX REV CODE 250 W/ 637 OVERRIDE(OP): Performed by: PHYSICIAN ASSISTANT

## 2020-06-22 PROCEDURE — 501664 HCHG TUBING, FILTER STRYKER: Performed by: SURGERY

## 2020-06-22 PROCEDURE — 0DT34ZZ RESECTION OF LOWER ESOPHAGUS, PERCUTANEOUS ENDOSCOPIC APPROACH: ICD-10-PCS | Performed by: SURGERY

## 2020-06-22 PROCEDURE — 160029 HCHG SURGERY MINUTES - 1ST 30 MINS LEVEL 4: Performed by: SURGERY

## 2020-06-22 PROCEDURE — 700102 HCHG RX REV CODE 250 W/ 637 OVERRIDE(OP): Performed by: SURGERY

## 2020-06-22 PROCEDURE — 700104 HCHG RX REV CODE 254: Performed by: ANESTHESIOLOGY

## 2020-06-22 PROCEDURE — 770001 HCHG ROOM/CARE - MED/SURG/GYN PRIV*

## 2020-06-22 PROCEDURE — 71045 X-RAY EXAM CHEST 1 VIEW: CPT

## 2020-06-22 PROCEDURE — 700111 HCHG RX REV CODE 636 W/ 250 OVERRIDE (IP): Performed by: PHYSICIAN ASSISTANT

## 2020-06-22 PROCEDURE — 88307 TISSUE EXAM BY PATHOLOGIST: CPT

## 2020-06-22 PROCEDURE — 501570 HCHG TROCAR, SEPARATOR: Performed by: SURGERY

## 2020-06-22 PROCEDURE — A6404 STERILE GAUZE > 48 SQ IN: HCPCS | Performed by: SURGERY

## 2020-06-22 PROCEDURE — 86850 RBC ANTIBODY SCREEN: CPT

## 2020-06-22 PROCEDURE — 700105 HCHG RX REV CODE 258

## 2020-06-22 PROCEDURE — 160041 HCHG SURGERY MINUTES - EA ADDL 1 MIN LEVEL 4: Performed by: SURGERY

## 2020-06-22 PROCEDURE — 500868 HCHG NEEDLE, SURGI(VARES): Performed by: SURGERY

## 2020-06-22 PROCEDURE — A9270 NON-COVERED ITEM OR SERVICE: HCPCS

## 2020-06-22 PROCEDURE — 0DX64Z5 TRANSFER STOMACH TO ESOPHAGUS, PERCUTANEOUS ENDOSCOPIC APPROACH: ICD-10-PCS | Performed by: SURGERY

## 2020-06-22 PROCEDURE — 502240 HCHG MISC OR SUPPLY RC 0272: Performed by: SURGERY

## 2020-06-22 PROCEDURE — 160048 HCHG OR STATISTICAL LEVEL 1-5: Performed by: SURGERY

## 2020-06-22 PROCEDURE — 86901 BLOOD TYPING SEROLOGIC RH(D): CPT

## 2020-06-22 PROCEDURE — 700101 HCHG RX REV CODE 250: Performed by: SURGERY

## 2020-06-22 PROCEDURE — 700111 HCHG RX REV CODE 636 W/ 250 OVERRIDE (IP): Performed by: ANESTHESIOLOGY

## 2020-06-22 PROCEDURE — 160002 HCHG RECOVERY MINUTES (STAT): Performed by: SURGERY

## 2020-06-22 PROCEDURE — 94640 AIRWAY INHALATION TREATMENT: CPT

## 2020-06-22 PROCEDURE — 502000 HCHG MISC OR IMPLANTS RC 0278: Performed by: SURGERY

## 2020-06-22 PROCEDURE — A9270 NON-COVERED ITEM OR SERVICE: HCPCS | Performed by: PHYSICIAN ASSISTANT

## 2020-06-22 PROCEDURE — 500378 HCHG DRAIN, J-VAC ROUND 19FR: Performed by: SURGERY

## 2020-06-22 DEVICE — IMPLANTABLE DEVICE: Type: IMPLANTABLE DEVICE | Status: FUNCTIONAL

## 2020-06-22 RX ORDER — SODIUM CHLORIDE 9 MG/ML
INJECTION, SOLUTION INTRAVENOUS
Status: COMPLETED
Start: 2020-06-22 | End: 2020-06-22

## 2020-06-22 RX ORDER — OXYCODONE HCL 5 MG/5 ML
10 SOLUTION, ORAL ORAL
Status: DISCONTINUED | OUTPATIENT
Start: 2020-06-22 | End: 2020-06-22 | Stop reason: HOSPADM

## 2020-06-22 RX ORDER — DEXAMETHASONE SODIUM PHOSPHATE 4 MG/ML
INJECTION, SOLUTION INTRA-ARTICULAR; INTRALESIONAL; INTRAMUSCULAR; INTRAVENOUS; SOFT TISSUE PRN
Status: DISCONTINUED | OUTPATIENT
Start: 2020-06-22 | End: 2020-06-22 | Stop reason: SURG

## 2020-06-22 RX ORDER — MEPERIDINE HYDROCHLORIDE 25 MG/ML
12.5 INJECTION INTRAMUSCULAR; INTRAVENOUS; SUBCUTANEOUS
Status: DISCONTINUED | OUTPATIENT
Start: 2020-06-22 | End: 2020-06-22 | Stop reason: HOSPADM

## 2020-06-22 RX ORDER — SCOLOPAMINE TRANSDERMAL SYSTEM 1 MG/1
1 PATCH, EXTENDED RELEASE TRANSDERMAL
Status: DISCONTINUED | OUTPATIENT
Start: 2020-06-22 | End: 2020-06-22 | Stop reason: HOSPADM

## 2020-06-22 RX ORDER — CEFAZOLIN SODIUM 1 G/3ML
INJECTION, POWDER, FOR SOLUTION INTRAMUSCULAR; INTRAVENOUS PRN
Status: DISCONTINUED | OUTPATIENT
Start: 2020-06-22 | End: 2020-06-22 | Stop reason: SURG

## 2020-06-22 RX ORDER — LORAZEPAM 2 MG/ML
.5-1 INJECTION INTRAMUSCULAR EVERY 4 HOURS PRN
Status: DISCONTINUED | OUTPATIENT
Start: 2020-06-22 | End: 2020-06-26 | Stop reason: HOSPADM

## 2020-06-22 RX ORDER — SODIUM CHLORIDE, SODIUM LACTATE, POTASSIUM CHLORIDE, CALCIUM CHLORIDE 600; 310; 30; 20 MG/100ML; MG/100ML; MG/100ML; MG/100ML
INJECTION, SOLUTION INTRAVENOUS CONTINUOUS
Status: DISCONTINUED | OUTPATIENT
Start: 2020-06-22 | End: 2020-06-22 | Stop reason: HOSPADM

## 2020-06-22 RX ORDER — HYDROMORPHONE HYDROCHLORIDE 1 MG/ML
.2-1 INJECTION, SOLUTION INTRAMUSCULAR; INTRAVENOUS; SUBCUTANEOUS
Status: DISCONTINUED | OUTPATIENT
Start: 2020-06-22 | End: 2020-06-26 | Stop reason: HOSPADM

## 2020-06-22 RX ORDER — DIPHENHYDRAMINE HYDROCHLORIDE 50 MG/ML
25 INJECTION INTRAMUSCULAR; INTRAVENOUS EVERY 6 HOURS PRN
Status: DISCONTINUED | OUTPATIENT
Start: 2020-06-22 | End: 2020-06-26 | Stop reason: HOSPADM

## 2020-06-22 RX ORDER — HYDROMORPHONE HYDROCHLORIDE 1 MG/ML
0.2 INJECTION, SOLUTION INTRAMUSCULAR; INTRAVENOUS; SUBCUTANEOUS
Status: DISCONTINUED | OUTPATIENT
Start: 2020-06-22 | End: 2020-06-22 | Stop reason: HOSPADM

## 2020-06-22 RX ORDER — CEFAZOLIN SODIUM 2 G/100ML
2 INJECTION, SOLUTION INTRAVENOUS EVERY 8 HOURS
Status: COMPLETED | OUTPATIENT
Start: 2020-06-22 | End: 2020-06-23

## 2020-06-22 RX ORDER — PANTOPRAZOLE SODIUM 40 MG/10ML
40 INJECTION, POWDER, LYOPHILIZED, FOR SOLUTION INTRAVENOUS DAILY
Status: DISCONTINUED | OUTPATIENT
Start: 2020-06-22 | End: 2020-06-25

## 2020-06-22 RX ORDER — MIDAZOLAM HYDROCHLORIDE 1 MG/ML
INJECTION INTRAMUSCULAR; INTRAVENOUS PRN
Status: DISCONTINUED | OUTPATIENT
Start: 2020-06-22 | End: 2020-06-22 | Stop reason: SURG

## 2020-06-22 RX ORDER — HALOPERIDOL 5 MG/ML
1 INJECTION INTRAMUSCULAR
Status: DISCONTINUED | OUTPATIENT
Start: 2020-06-22 | End: 2020-06-22 | Stop reason: HOSPADM

## 2020-06-22 RX ORDER — SCOLOPAMINE TRANSDERMAL SYSTEM 1 MG/1
PATCH, EXTENDED RELEASE TRANSDERMAL
Status: COMPLETED
Start: 2020-06-22 | End: 2020-06-25

## 2020-06-22 RX ORDER — SODIUM CHLORIDE, SODIUM LACTATE, POTASSIUM CHLORIDE, CALCIUM CHLORIDE 600; 310; 30; 20 MG/100ML; MG/100ML; MG/100ML; MG/100ML
INJECTION, SOLUTION INTRAVENOUS CONTINUOUS
Status: DISPENSED | OUTPATIENT
Start: 2020-06-22 | End: 2020-06-22

## 2020-06-22 RX ORDER — HYDRALAZINE HYDROCHLORIDE 20 MG/ML
5 INJECTION INTRAMUSCULAR; INTRAVENOUS
Status: DISCONTINUED | OUTPATIENT
Start: 2020-06-22 | End: 2020-06-22 | Stop reason: HOSPADM

## 2020-06-22 RX ORDER — BUPIVACAINE HYDROCHLORIDE AND EPINEPHRINE 5; 5 MG/ML; UG/ML
INJECTION, SOLUTION EPIDURAL; INTRACAUDAL; PERINEURAL
Status: DISCONTINUED | OUTPATIENT
Start: 2020-06-22 | End: 2020-06-22 | Stop reason: HOSPADM

## 2020-06-22 RX ORDER — SODIUM CHLORIDE, SODIUM LACTATE, POTASSIUM CHLORIDE, CALCIUM CHLORIDE 600; 310; 30; 20 MG/100ML; MG/100ML; MG/100ML; MG/100ML
INJECTION, SOLUTION INTRAVENOUS CONTINUOUS
Status: DISCONTINUED | OUTPATIENT
Start: 2020-06-22 | End: 2020-06-26 | Stop reason: HOSPADM

## 2020-06-22 RX ORDER — KETOROLAC TROMETHAMINE 30 MG/ML
30 INJECTION, SOLUTION INTRAMUSCULAR; INTRAVENOUS EVERY 6 HOURS PRN
Status: DISCONTINUED | OUTPATIENT
Start: 2020-06-22 | End: 2020-06-22

## 2020-06-22 RX ORDER — HYDROMORPHONE HYDROCHLORIDE 1 MG/ML
0.4 INJECTION, SOLUTION INTRAMUSCULAR; INTRAVENOUS; SUBCUTANEOUS
Status: DISCONTINUED | OUTPATIENT
Start: 2020-06-22 | End: 2020-06-22 | Stop reason: HOSPADM

## 2020-06-22 RX ORDER — OXYCODONE HCL 5 MG/5 ML
5 SOLUTION, ORAL ORAL
Status: DISCONTINUED | OUTPATIENT
Start: 2020-06-22 | End: 2020-06-22 | Stop reason: HOSPADM

## 2020-06-22 RX ORDER — METOCLOPRAMIDE HYDROCHLORIDE 5 MG/ML
INJECTION INTRAMUSCULAR; INTRAVENOUS PRN
Status: DISCONTINUED | OUTPATIENT
Start: 2020-06-22 | End: 2020-06-22 | Stop reason: SURG

## 2020-06-22 RX ORDER — DIPHENHYDRAMINE HYDROCHLORIDE 50 MG/ML
12.5 INJECTION INTRAMUSCULAR; INTRAVENOUS
Status: DISCONTINUED | OUTPATIENT
Start: 2020-06-22 | End: 2020-06-22 | Stop reason: HOSPADM

## 2020-06-22 RX ORDER — HYDROMORPHONE HYDROCHLORIDE 1 MG/ML
0.5 INJECTION, SOLUTION INTRAMUSCULAR; INTRAVENOUS; SUBCUTANEOUS
Status: DISCONTINUED | OUTPATIENT
Start: 2020-06-22 | End: 2020-06-22 | Stop reason: HOSPADM

## 2020-06-22 RX ORDER — BUDESONIDE AND FORMOTEROL FUMARATE DIHYDRATE 160; 4.5 UG/1; UG/1
2 AEROSOL RESPIRATORY (INHALATION) 2 TIMES DAILY
Status: DISCONTINUED | OUTPATIENT
Start: 2020-06-22 | End: 2020-06-26 | Stop reason: HOSPADM

## 2020-06-22 RX ORDER — HYDRALAZINE HYDROCHLORIDE 20 MG/ML
10 INJECTION INTRAMUSCULAR; INTRAVENOUS EVERY 6 HOURS PRN
Status: DISCONTINUED | OUTPATIENT
Start: 2020-06-22 | End: 2020-06-26 | Stop reason: HOSPADM

## 2020-06-22 RX ORDER — LABETALOL HYDROCHLORIDE 5 MG/ML
5 INJECTION, SOLUTION INTRAVENOUS
Status: DISCONTINUED | OUTPATIENT
Start: 2020-06-22 | End: 2020-06-22 | Stop reason: HOSPADM

## 2020-06-22 RX ORDER — POTASSIUM CHLORIDE 750 MG/1
CAPSULE, EXTENDED RELEASE ORAL
COMMUNITY
Start: 2020-04-20

## 2020-06-22 RX ORDER — ONDANSETRON 4 MG/1
4 TABLET, ORALLY DISINTEGRATING ORAL EVERY 4 HOURS PRN
Status: DISCONTINUED | OUTPATIENT
Start: 2020-06-22 | End: 2020-06-26 | Stop reason: HOSPADM

## 2020-06-22 RX ORDER — ONDANSETRON 2 MG/ML
4 INJECTION INTRAMUSCULAR; INTRAVENOUS EVERY 4 HOURS PRN
Status: DISCONTINUED | OUTPATIENT
Start: 2020-06-22 | End: 2020-06-26 | Stop reason: HOSPADM

## 2020-06-22 RX ORDER — LIDOCAINE HYDROCHLORIDE 20 MG/ML
INJECTION, SOLUTION EPIDURAL; INFILTRATION; INTRACAUDAL; PERINEURAL PRN
Status: DISCONTINUED | OUTPATIENT
Start: 2020-06-22 | End: 2020-06-22 | Stop reason: HOSPADM

## 2020-06-22 RX ORDER — BENAZEPRIL HYDROCHLORIDE 20 MG/1
20 TABLET ORAL DAILY
Status: DISCONTINUED | OUTPATIENT
Start: 2020-06-23 | End: 2020-06-26 | Stop reason: HOSPADM

## 2020-06-22 RX ORDER — ENALAPRILAT 1.25 MG/ML
2.5 INJECTION INTRAVENOUS EVERY 6 HOURS PRN
Status: DISCONTINUED | OUTPATIENT
Start: 2020-06-22 | End: 2020-06-26 | Stop reason: HOSPADM

## 2020-06-22 RX ORDER — NITROGLYCERIN 0.4 MG/1
0.4 TABLET SUBLINGUAL PRN
Status: DISCONTINUED | OUTPATIENT
Start: 2020-06-22 | End: 2020-06-26 | Stop reason: HOSPADM

## 2020-06-22 RX ORDER — LEVOTHYROXINE SODIUM 0.07 MG/1
75 TABLET ORAL
Status: DISCONTINUED | OUTPATIENT
Start: 2020-06-23 | End: 2020-06-26 | Stop reason: HOSPADM

## 2020-06-22 RX ORDER — ACETAMINOPHEN 500 MG
500-1000 TABLET ORAL EVERY 6 HOURS PRN
COMMUNITY

## 2020-06-22 RX ORDER — HYDROMORPHONE HYDROCHLORIDE 2 MG/ML
INJECTION, SOLUTION INTRAMUSCULAR; INTRAVENOUS; SUBCUTANEOUS PRN
Status: DISCONTINUED | OUTPATIENT
Start: 2020-06-22 | End: 2020-06-22 | Stop reason: SURG

## 2020-06-22 RX ORDER — INDOCYANINE GREEN AND WATER 25 MG
KIT INJECTION PRN
Status: DISCONTINUED | OUTPATIENT
Start: 2020-06-22 | End: 2020-06-22 | Stop reason: SURG

## 2020-06-22 RX ORDER — ONDANSETRON 2 MG/ML
INJECTION INTRAMUSCULAR; INTRAVENOUS PRN
Status: DISCONTINUED | OUTPATIENT
Start: 2020-06-22 | End: 2020-06-22 | Stop reason: SURG

## 2020-06-22 RX ORDER — SCOLOPAMINE TRANSDERMAL SYSTEM 1 MG/1
1 PATCH, EXTENDED RELEASE TRANSDERMAL
Status: DISCONTINUED | OUTPATIENT
Start: 2020-06-22 | End: 2020-06-26 | Stop reason: HOSPADM

## 2020-06-22 RX ADMIN — FENTANYL CITRATE 50 MCG: 50 INJECTION INTRAMUSCULAR; INTRAVENOUS at 13:25

## 2020-06-22 RX ADMIN — POVIDONE-IODINE 15 ML: 10 SOLUTION TOPICAL at 08:12

## 2020-06-22 RX ADMIN — FENTANYL CITRATE 50 MCG: 50 INJECTION INTRAMUSCULAR; INTRAVENOUS at 13:35

## 2020-06-22 RX ADMIN — SUGAMMADEX 200 MG: 100 INJECTION, SOLUTION INTRAVENOUS at 13:06

## 2020-06-22 RX ADMIN — PROPOFOL 150 MG: 10 INJECTION, EMULSION INTRAVENOUS at 09:45

## 2020-06-22 RX ADMIN — INDOCYANINE GREEN AND WATER 7.5 MG: KIT at 11:03

## 2020-06-22 RX ADMIN — METOCLOPRAMIDE 10 MG: 5 INJECTION, SOLUTION INTRAMUSCULAR; INTRAVENOUS at 10:06

## 2020-06-22 RX ADMIN — HYDROMORPHONE HYDROCHLORIDE 1 MG: 2 INJECTION, SOLUTION INTRAMUSCULAR; INTRAVENOUS; SUBCUTANEOUS at 09:45

## 2020-06-22 RX ADMIN — HYDROMORPHONE HYDROCHLORIDE 1 MG: 1 INJECTION, SOLUTION INTRAMUSCULAR; INTRAVENOUS; SUBCUTANEOUS at 20:04

## 2020-06-22 RX ADMIN — LIDOCAINE HYDROCHLORIDE 0.5 ML: 10 INJECTION, SOLUTION EPIDURAL; INFILTRATION; INTRACAUDAL at 08:12

## 2020-06-22 RX ADMIN — SODIUM CHLORIDE 500 ML: 9 INJECTION, SOLUTION INTRAVENOUS at 17:08

## 2020-06-22 RX ADMIN — CEFAZOLIN 2 G: 10 INJECTION, POWDER, FOR SOLUTION INTRAVENOUS at 17:08

## 2020-06-22 RX ADMIN — ROCURONIUM BROMIDE 50 MG: 10 INJECTION, SOLUTION INTRAVENOUS at 09:46

## 2020-06-22 RX ADMIN — BUDESONIDE AND FORMOTEROL FUMARATE DIHYDRATE 2 PUFF: 160; 4.5 AEROSOL RESPIRATORY (INHALATION) at 20:47

## 2020-06-22 RX ADMIN — HYDROMORPHONE HYDROCHLORIDE 1 MG: 2 INJECTION, SOLUTION INTRAMUSCULAR; INTRAVENOUS; SUBCUTANEOUS at 10:40

## 2020-06-22 RX ADMIN — LIDOCAINE HYDROCHLORIDE 60 MG: 20 INJECTION, SOLUTION EPIDURAL; INFILTRATION; INTRACAUDAL at 09:45

## 2020-06-22 RX ADMIN — ONDANSETRON 4 MG: 2 INJECTION INTRAMUSCULAR; INTRAVENOUS at 12:15

## 2020-06-22 RX ADMIN — SODIUM CHLORIDE, POTASSIUM CHLORIDE, SODIUM LACTATE AND CALCIUM CHLORIDE: 600; 310; 30; 20 INJECTION, SOLUTION INTRAVENOUS at 16:22

## 2020-06-22 RX ADMIN — ALBUTEROL SULFATE 2.5 MG: 2.5 SOLUTION RESPIRATORY (INHALATION) at 13:25

## 2020-06-22 RX ADMIN — FENTANYL CITRATE 50 MCG: 50 INJECTION INTRAMUSCULAR; INTRAVENOUS at 13:22

## 2020-06-22 RX ADMIN — SODIUM CHLORIDE, POTASSIUM CHLORIDE, SODIUM LACTATE AND CALCIUM CHLORIDE: 600; 310; 30; 20 INJECTION, SOLUTION INTRAVENOUS at 08:12

## 2020-06-22 RX ADMIN — CEFAZOLIN 3 G: 330 INJECTION, POWDER, FOR SOLUTION INTRAMUSCULAR; INTRAVENOUS at 10:04

## 2020-06-22 RX ADMIN — MIDAZOLAM HYDROCHLORIDE 2 MG: 1 INJECTION, SOLUTION INTRAMUSCULAR; INTRAVENOUS at 09:36

## 2020-06-22 RX ADMIN — SCOLOPAMINE TRANSDERMAL SYSTEM 1 PATCH: 1 PATCH, EXTENDED RELEASE TRANSDERMAL at 09:32

## 2020-06-22 RX ADMIN — DEXAMETHASONE SODIUM PHOSPHATE 8 MG: 4 INJECTION, SOLUTION INTRA-ARTICULAR; INTRALESIONAL; INTRAMUSCULAR; INTRAVENOUS; SOFT TISSUE at 10:03

## 2020-06-22 RX ADMIN — HYDROMORPHONE HYDROCHLORIDE 0.5 MG: 2 INJECTION, SOLUTION INTRAMUSCULAR; INTRAVENOUS; SUBCUTANEOUS at 12:14

## 2020-06-22 RX ADMIN — ROCURONIUM BROMIDE 20 MG: 10 INJECTION, SOLUTION INTRAVENOUS at 11:41

## 2020-06-22 RX ADMIN — LORAZEPAM 1 MG: 2 INJECTION INTRAMUSCULAR; INTRAVENOUS at 22:26

## 2020-06-22 RX ADMIN — PANTOPRAZOLE SODIUM 40 MG: 40 INJECTION, POWDER, LYOPHILIZED, FOR SOLUTION INTRAVENOUS at 17:08

## 2020-06-22 RX ADMIN — MEPERIDINE HYDROCHLORIDE 12.5 MG: 25 INJECTION INTRAMUSCULAR; INTRAVENOUS; SUBCUTANEOUS at 13:45

## 2020-06-22 ASSESSMENT — LIFESTYLE VARIABLES
HOW MANY TIMES IN THE PAST YEAR HAVE YOU HAD 5 OR MORE DRINKS IN A DAY: 0
HAVE YOU EVER FELT YOU SHOULD CUT DOWN ON YOUR DRINKING: NO
AVERAGE NUMBER OF DAYS PER WEEK YOU HAVE A DRINK CONTAINING ALCOHOL: 0
EVER_SMOKED: NEVER
TOTAL SCORE: 0
ON A TYPICAL DAY WHEN YOU DRINK ALCOHOL HOW MANY DRINKS DO YOU HAVE: 0
TOTAL SCORE: 0
ALCOHOL_USE: NO
DOES PATIENT WANT TO STOP DRINKING: NO
EVER FELT BAD OR GUILTY ABOUT YOUR DRINKING: NO
CONSUMPTION TOTAL: NEGATIVE
EVER HAD A DRINK FIRST THING IN THE MORNING TO STEADY YOUR NERVES TO GET RID OF A HANGOVER: NO
HAVE PEOPLE ANNOYED YOU BY CRITICIZING YOUR DRINKING: NO
TOTAL SCORE: 0

## 2020-06-22 ASSESSMENT — COGNITIVE AND FUNCTIONAL STATUS - GENERAL
SUGGESTED CMS G CODE MODIFIER DAILY ACTIVITY: CJ
WALKING IN HOSPITAL ROOM: A LITTLE
STANDING UP FROM CHAIR USING ARMS: A LITTLE
SUGGESTED CMS G CODE MODIFIER MOBILITY: CJ
HELP NEEDED FOR BATHING: A LITTLE
MOBILITY SCORE: 20
DRESSING REGULAR UPPER BODY CLOTHING: A LITTLE
DRESSING REGULAR LOWER BODY CLOTHING: A LITTLE
DAILY ACTIVITIY SCORE: 21
CLIMB 3 TO 5 STEPS WITH RAILING: A LITTLE
MOVING FROM LYING ON BACK TO SITTING ON SIDE OF FLAT BED: A LITTLE

## 2020-06-22 ASSESSMENT — FIBROSIS 4 INDEX: FIB4 SCORE: 1.23

## 2020-06-22 ASSESSMENT — COPD QUESTIONNAIRES
DO YOU EVER COUGH UP ANY MUCUS OR PHLEGM?: NO/ONLY WITH OCCASIONAL COLDS OR INFECTIONS
HAVE YOU SMOKED AT LEAST 100 CIGARETTES IN YOUR ENTIRE LIFE: YES
DURING THE PAST 4 WEEKS HOW MUCH DID YOU FEEL SHORT OF BREATH: SOME OF THE TIME
COPD SCREENING SCORE: 6

## 2020-06-22 ASSESSMENT — PATIENT HEALTH QUESTIONNAIRE - PHQ9
1. LITTLE INTEREST OR PLEASURE IN DOING THINGS: NOT AT ALL
2. FEELING DOWN, DEPRESSED, IRRITABLE, OR HOPELESS: NOT AT ALL
SUM OF ALL RESPONSES TO PHQ9 QUESTIONS 1 AND 2: 0

## 2020-06-22 NOTE — ANESTHESIA TIME REPORT
Anesthesia Start and Stop Event Times     Date Time Event    6/22/2020 0932 Anesthesia Start     0935 Ready for Procedure     1327 Anesthesia Stop        Responsible Staff  06/22/20    Name Role Begin End    Ramón Dobson M.D. Anesth 0932 1327        Preop Diagnosis (Free Text):  Pre-op Diagnosis     DISTAL ESOPHAGEAL CANCER, PD ADENOCARCINOMA        Preop Diagnosis (Codes):    Post op Diagnosis  Cancer of distal third of esophagus (HCC)      Premium Reason  Non-Premium    Comments:

## 2020-06-22 NOTE — OR SURGEON
Immediate Post OP Note    PreOp Diagnosis: Esophageal cancer    PostOp Diagnosis: Same    Procedure(s):  ESOPHAGECTOMY, LAPAROSCOPIC/THORACOSCOPIC - NODE DISSECTION - Wound Class: Clean contaminated    Surgeon(s):  John H Ganser, M.D.    Anesthesiologist/Type of Anesthesia:  Anesthesiologist: Ramón Dobson M.D.  Anesthesia Technician: Lamont Rodríguez/General    Surgical Staff:  Circulator: Jae Caraballo R.N.  Scrub Person: Lissette Clay  First Assist: ARLEEN Degroot  Count Yarnell: Nav Che R.N.    Specimens removed if any:  ID Type Source Tests Collected by Time Destination   A : Esophagus and Proximal Stomach Tissue Esophagus PATHOLOGY SPECIMEN John H Ganser, M.D. 6/22/2020 12:41 PM    B : subcarinal lymph nodes Other Other PATHOLOGY SPECIMEN John H Ganser, M.D. 6/22/2020 12:41 PM        Estimated Blood Loss: 200ml    Findings: 0    Complications: 0        6/22/2020 1:07 PM John H Ganser, M.D.

## 2020-06-22 NOTE — ANESTHESIA PROCEDURE NOTES
Arterial Line  Performed by: Ramón Dobson M.D.  Authorized by: Ramón Dobson M.D.     Localization: surface landmarks    Patient Location:  OR  Indication: continuous blood pressure monitoring        Catheter Size:  20 G  Seldinger Technique?: Yes    Laterality:  Right  Site:  Radial artery  Line Secured:  Antimicrobial disc, tape and transparent dressing  Events: patient tolerated procedure well with no complications

## 2020-06-22 NOTE — ANESTHESIA PREPROCEDURE EVALUATION
Relevant Problems   ANESTHESIA   (+) PONV (postoperative nausea and vomiting)      PULMONARY   (+) COPD (chronic obstructive pulmonary disease) ([facility])      CARDIAC   (+) Coronary artery disease due to calcified coronary lesion   (+) Coronary artery disease involving native coronary artery of native heart without angina pectoris   (+) Essential hypertension, benign   (+) STEMI (ST elevation myocardial infarction) ([facility])      ENDO   (+) Hypothyroid      Other   (+) BMI 37.0-37.9, adult   (+) Cardiomyopathy, ischemic   (+) Central sleep apnea   (+) Cervical disc disease   (+) Diastolic heart failure ([facility])   (+) Hypoxemia   (+) Malignant neoplasm of lower third of esophagus ([facility])   (+) Systolic heart failure secondary to coronary artery disease ([facility])     Anes H&P:  PAST MEDICAL HISTORY:   65 y.o. male who presents for Procedure(s):  ESOPHAGECTOMY, THORACOSCOPIC - NODE DISSECTION.  He has current and past medical problems significant for:    Past Medical History:   Diagnosis Date   • Acute MI, inferolateral wall, initial episode of care ([facility])     X5 since 2007 last one 8/2019, Dr. Andrade at [facility]   • Anesthesia     PONV   • Arthritis 03/05/2020    Joints   • ASTHMA     inhaler daily   • Breath shortness    • Bronchitis 2014   • CAD (coronary artery disease) 2/13/2014    Bare-metal stent to the circumflex in December 2013. Prior stent to the LAD diagonal had mild in-stent restenosis. 30% disease is noted in the right coronary artery.   • Cancer ([facility]) 03/05/2020    Esophagus CURRENT   • Chronic airway obstruction, not elsewhere classified    • Congestive heart failure ([facility])    • EMPHYSEMA     COPD   • Essential hypertension, benign 2/13/2014   • High cholesterol    • History of chronic cough    • HLD (hyperlipidemia) 2/13/2014   • Hypertension 2013    states well controlled on meds   • ADRYAN on CPAP 6/28/2017    bipap   • PAF (paroxysmal atrial fibrillation) ([facility]) 7/26/2015    fixed with cardioversion, Dr Das follows   •  Pneumonia        • PONV (postoperative nausea and vomiting)    • Psychiatric problem     anxiety   • Unspecified disorder of thyroid 2010    on synthroid   • Unspecified hemorrhagic conditions     bruises easily related to brilinta       SMOKING/ALCOHOL/RECREATIONAL DRUG USE:  Social History     Tobacco Use   • Smoking status: Former Smoker     Packs/day: 2.00     Years: 20.00     Pack years: 40.00     Types: Cigarettes, Cigars     Last attempt to quit: 1995     Years since quittin.4   • Smokeless tobacco: Never Used   • Tobacco comment: Continued abstinence advised   Substance Use Topics   • Alcohol use: Not Currently   • Drug use: Yes     Types: Inhaled     Comment: medical marijuana     Social History     Substance and Sexual Activity   Drug Use Yes   • Types: Inhaled    Comment: medical marijuana       PAST SURGICAL HISTORY:  Past Surgical History:   Procedure Laterality Date   • PB LAP,GASTROSTOMY,W/O TUBE CONSTR N/A 2020    Procedure: CREATION, GASTROSTOMY, LAPAROSCOPIC-JEJUNOSTOMY PLACEMENT;  Surgeon: John H Ganser, M.D.;  Location: [facility];  Service: General   • PB ENDOSCOPIC US EXAM, ESOPH  3/6/2020    Procedure: EGD, WITH ENDOSCOPIC US - UPPER LINEAR RADIAL;  Surgeon: Gavino Cardenas M.D.;  Location: [facility];  Service: Gastroenterology   • GASTROSCOPY-ENDO  3/6/2020    Procedure: GASTROSCOPY - W/ESOPHAGEAL STENTING AND BIOPSIES;  Surgeon: Gavino Cardenas M.D.;  Location: [facility];  Service: Gastroenterology   • EGD WITH ASP/BX  3/6/2020    Procedure: EGD, WITH ASPIRATION BIOPSY - FNA;  Surgeon: Gavino Cardenas M.D.;  Location: [facility];  Service: Gastroenterology   • MULTIPLE CORONARY ARTERY BYPASS ENDO VEIN HARVEST N/A 2015    Procedure: MULTIPLE CORONARY ARTERY BYPASS ENDO VEIN HARVEST;  Surgeon: Deuce Tam M.D.;  Location: [facility];  Service:    • CERVICAL DISK AND FUSION ANTERIOR  10/13/2014  "   Performed by Moises Kerns M.D. at SURGERY [facility] ORS   • OTHER ORTHOPEDIC SURGERY  2012 1975-present 5 surgeries- right knee x3, left knee x2    • COLONOSCOPY  2009   • OTHER CARDIAC SURGERY  2008    cardiac stents   • DENTAL SURGERY  1971    wisdom teeth   • TONSILLECTOMY  1958   • HERNIA REPAIR  1956    'born with hernia'       ALLERGIES:   Allergies   Allergen Reactions   • Morphine Anxiety and Unspecified     Pt becomes irritable  Agitation; hot flashes.       VITALS:  Patient Vitals for the past 24 hrs:   BP Temp Temp src Pulse Resp SpO2 Height Weight   06/22/20 0708 132/74 36.8 °C (98.2 °F) Temporal 70 17 94 % 1.88 m (6' 2\") 101.7 kg (224 lb 3.3 oz)       MEDICATIONS:  No current facility-administered medications on file prior to encounter.      Current Outpatient Medications on File Prior to Encounter   Medication Sig Dispense Refill   • temazepam (RESTORIL) 22.5 MG capsule Take 1 Cap by mouth at bedtime as needed for Sleep for up to 90 days. 30 Cap 2   • atorvastatin (LIPITOR) 80 MG tablet Take 80 mg by mouth every evening.     • doxazosin (CARDURA) 4 MG Tab Take 4 mg by mouth every day.     • ezetimibe (ZETIA) 10 MG Tab Take 10 mg by mouth every day.     • fluticasone-salmeterol (ADVAIR) 500-50 MCG/DOSE AEROSOL POWDER, BREATH ACTIVATED Inhale 1 Puff by mouth every 12 hours.     • potassium chloride SA (K-DUR) 10 MEQ Tab CR Take 10 mEq by mouth 2 times a day.     • tiotropium (SPIRIVA) 18 MCG Cap Inhale 18 mcg by mouth every day.     • BRILINTA 90 MG Tab tablet Take 90 mg by mouth 2 Times a Day. Indications: Acute Coronary Syndrome     • metoprolol SR (TOPROL XL) 25 MG TABLET SR 24 HR Take 25 mg by mouth every day.     • benazepril (LOTENSIN) 20 MG Tab Take 1 Tab by mouth every day. 90 Tab 3   • nitroglycerin (NITROSTAT) 0.4 MG SL Tab Place 1 Tab under tongue as needed for Chest Pain (Place 1 tablet under the tongue every 5 minutes up to 3 doses as needed for chest pain). 75 Tab 3   • " omeprazole (PRILOSEC) 40 MG delayed-release capsule Take 40 mg by mouth every day.     • levothyroxine (SYNTHROID) 75 MCG Tab Take 75 mcg by mouth Every morning on an empty stomach.     • Melatonin 10 MG Tab Take 10 mg by mouth every bedtime.     • Cranberry 500 MG Cap Take 1 Cap by mouth every day.     • Zinc 50 MG Cap Take 50 mg by mouth every day.     • diphenhydrAMINE (BENADRYL) 25 MG Tab Take 25 mg by mouth every bedtime.     • aspirin EC (ECOTRIN) 81 MG TBEC Take 81 mg by mouth every day.     • tamsulosin (FLOMAX) 0.4 MG capsule Take 0.4 mg by mouth ONE-HALF HOUR AFTER BREAKFAST.         LABS:  Lab Results   Component Value Date/Time    HEMOGLOBIN 13.7 (L) 06/17/2020 1503    HEMATOCRIT 42.2 06/17/2020 1503    WBC 6.5 06/17/2020 1503     Lab Results   Component Value Date/Time    SODIUM 139 06/17/2020 1503    POTASSIUM 4.5 06/17/2020 1503    CHLORIDE 103 06/17/2020 1503    CO2 21 06/17/2020 1503    GLUCOSE 124 (H) 06/17/2020 1503    BUN 20 06/17/2020 1503    CALCIUM 9.3 06/17/2020 1503     Lab Results   Component Value Date/Time    INR 1.32 (H) 07/23/2015 2146       No components found for: SARS-COV-2 BY PCR      SARS-CoV2 result: Neg      BLOOD PRODUCTS:  No results found for: ABORH      PREVIOUS ANESTHETICS: See EMR  __________________________________________      Physical Exam    Airway   Mallampati: II  TM distance: >3 FB  Neck ROM: full       Cardiovascular - normal exam  Rhythm: regular  Rate: normal  (-) murmur     Dental - normal exam      Very poor dentition   Pulmonary - normal exam  Breath sounds clear to auscultation     Abdominal   Abdomen: soft     Neurological - normal exam                 Anesthesia Plan    ASA 4   ASA physical status 4 criteria: severe reduction of ejection fractions, other (comment) and CAD/stents - recent (< 3 months)    Plan - general       Airway plan will be ETT        Induction: intravenous    Postoperative Plan: Postoperative administration of opioids is  intended.    Pertinent diagnostic labs and testing reviewed    Informed Consent:    Anesthetic plan and risks discussed with patient.    Use of blood products discussed with: patient whom consented to blood products.

## 2020-06-22 NOTE — PROGRESS NOTES
Report received from Natalia LEMON. Pt arrived to T 425 and ambulated with steady gait to bed. Assessment completed.  Pt is A&O x4. Pt on 2L NC.   Denies pain at this time.   Denies nausea.   - numbness, - tingling.  Last BM 6/21 PTA.  Spring catheter in place draining to gravity. Stat lock on leg.   Strict NPO  Pt up SBA. Tolerates well.   Call light within reach. All needs met at this time. Fall Precautions and hourly rounding in place.      2 RN skin check complete    NG to R nare secured with tape.   Chest tube to R chest. Dressing CDI.  x3 surgical incisions to R flank/chest area. Gauze and tegaderm, CDI.  x5 lap sites to abd, gauze and tegaderm, CDI.   Sacrum intact.   All other skin intact.

## 2020-06-22 NOTE — PROGRESS NOTES
Pre-op complete. 2nd COD sample sent to lab. Patient and family updated on plan of care. All questions answered at this time.  Call light within reach, advised to call for any questions or concerns. Hourly rounding in place.

## 2020-06-22 NOTE — OP REPORT
DATE OF SERVICE:  06/22/2020    PREOPERATIVE DIAGNOSIS:  Distal esophageal adenocarcinoma.    POSTOPERATIVE DIAGNOSIS:  Distal esophageal adenocarcinoma.    PROCEDURES:  Minimally invasive esophagectomy with gastric pullthrough,   lymphadenectomy.    SURGEON:  John H. Ganser, MD    ASSISTANT:  Carlos Colorado PA-C    ANESTHESIA:  General.    ANESTHESIOLOGIST:  Ramón Dobson MD    INDICATIONS:  The patient is a 65-year-old male who developed dysphagia and   was found to have a distal esophageal cancer, T3 N0.  He underwent neoadjuvant   chemotherapy and radiation.  He required a feeding tube prior to treatment   due to dysphagia.  He is eating better at this point and followup scans showed   no evidence of metastatic disease.  Risks, benefits, and alternatives to   esophagectomy were outlined in detail.  Questions answered and wished to   proceed.    FINDINGS:  There was significant scarring and narrowing at the distal   esophagus and the proximal margin was about 10 to 12 cm prior to fixation.    There was no obvious adenopathy or disease outside of the local area.    PROCEDURE:  The patient was identified and general anesthetic administered   with a double-lumen endotracheal tube.  His abdomen was prepped and draped in   the usual sterile fashion.  Local anesthesia of 0.5% Marcaine with epinephrine   was injected prior to skin incision.  The abdomen was insufflated with Veress   needle and two 5 mm and two 12 mm trocars placed and a Litzy liver   retractor positioned under the lateral segment of liver.  Dissection was begun   by dividing the gastrohepatic ligament with the Harmonic scalpel, which was   used for all of the dissection.  The hiatus was circumferentially dissected   leaving the fascia off the amisha muscles on the specimen side.  There was no   obvious invasion outside of the esophagus.  The omentum was then divided   between the stomach and transverse colon being careful to avoid injury to  the   gastroepiploic vessels.  This was dissected distally, freeing the transverse   colon off of the area.  The omentum was then divided proximally leaving a   tongue of omentum along the greater curve of the stomach.  Short gastric   vessels were divided with Harmonic scalpel with good control.  All of the   tissue around the left gastric vessels were dissected out and the celiac as   well, and left on the specimen side and the left gastric vessels divided at   their origin with the Coram 60 power stapler using a vascular load with good   control.  Vessels were taken down on the lesser curve of the stomach distally   down onto the stomach wall.  The distal stomach was then tubularized up to   the fundus starting on the lesser curve with sequential brought the Coram 60   powered stapler using gold loads.    At this point, indocyanine green dye was injected and the camera switched to   pinpoint mode reveal fluorescence and there was good blood supply to the   entire distal stomach.  There was a small bit of omentum that had poor blood   supply and this was excised with the Harmonic scalpel, removed and discarded.    Dissection was then carried circumferentially around the esophagus as far up   in the chest as possible.  The right pleura was opened and left on the   specimen side and this was opened as far as visible into the chest.  Left   pleura was also adherent to this area, was dissected and left on the specimen   side.  Circumferential dissection was carried around the esophagus above this   area.  The proximal and distal stomachs were attached with the EndoStitch   device and all of this was placed into the right chest cavity.  Care was taken   to maintain orientation of the distal stomach as it was pulled through the   hiatus.  The abdomen was irrigated, hemostasis assured.  Liver retracting   trocars were withdrawn, the abdomen deflated, and the incisions closed with   Vicryl.  Sterile dressings were  applied.    The patient was then placed in left lateral decubitus position and his right   chest was prepped and draped in the usual sterile fashion.  Additional local   anesthesia was injected and three 5 mm trocars were placed and an   approximately 4 cm accessory incision made posteriorly in approximately the   9th intercostal space.  The pleura was then divided where we left off up to   and above the azygos vein.  The azygos vein was divided with the vascular   stapler.  The esophagus was then dissected out the mediastinum controlling all   feeding vessels with Hemoclips.  This was dissected well above the azygos   vein above the radiation field.  The esophagus was then transected at this   point with the Harmonic scalpel.  A wound protector was placed through the   accessory incision and the specimen removed.  It was opened on the back table   and as noted above, there was scarring at the gastroesophageal junction and   margins that were grossly widely clear.    The chest was irrigated, hemostasis assured.  Some subcarinal nodes were   dissected out and feeding vessels controlled with Hemoclips.  There is a small   amount of oozing in this area and sponge was placed in this area and it was   observed.  The mediastinum was then coated with fibrin glue to assist in   assuring hemostasis.  The distal stomach was then brought up and there was a   good length.  An additional dose of indocyanine dye was injected and there was   good blood supply to the stomach.  The esophagus had good blood supply as   well.  A pursestring of 0 Surgidac was then placed into the esophagus and the   25 mm circular stapler anvil was placed into the esophagus and the pursestring   tied.  The tip of the stomach was then opened with the Harmonic scalpel.  The   stapler handle was advanced through the accessory incision, placed into the   stomach and the spike advanced anterior to the greater curve of the stomach   several centimeters  distally.  This was engaged with the anvil and maintaining   orientation proximally and distally, the stapler was closed and fired.  It   was removed and there were 2 full rings of tissue present.  The anastomosis   appeared intact.  The stapler defect was then stapled off with a stapler using   a gold load and this tissue was discarded.  Nasogastric tube was passed by   anesthesia and placed into the distal stomach.  Tongue of omentum was then   brought right to left behind the esophagus, rotated anteriorly wrapping around   the anastomosis nicely.  The chest was irrigated and further hemostasis   assured.  A 19-Japanese Bruno drain was passed through the midaxillary line   incision, angled towards the apex and posteriorly along the mediastinum and   secured to skin with silk.  Lung was reexpanded.  The accessory incision   closed with 0 Vicryl at the muscle level and skin incisions with 4-0 Vicryl   subcuticular sutures.  Sterile dressings were applied and the tube attached to   pleural suction device.    Sponge and needle counts were correct x2 at the end of procedure.    Estimated blood loss approximately 200 mL.       ____________________________________     JOHN H. GANSER, MD    [facility] / [facility]    DD:  06/22/2020 13:16:16  DT:  06/22/2020 14:30:30    D#:  [id number]  Job#:  [id number]    cc: Abelardo Arias MD, VELMA GOODRICH MD, Gavino Cardenas MD, Jerry Bates MD

## 2020-06-22 NOTE — ANESTHESIA PROCEDURE NOTES
Airway    Date/Time: 6/22/2020 9:49 AM  Performed by: Ramón Dobson M.D.  Authorized by: Ramón Dobson M.D.     Location:  OR  Urgency:  Elective  Difficult Airway: No    Indications for Airway Management:  Anesthesia      Spontaneous Ventilation: absent    Sedation Level:  Deep  Preoxygenated: Yes    Patient Position:  Sniffing  Mask Difficulty Assessment:  1 - vent by mask  Final Airway Type:  Endotracheal airway  Final Endotracheal Airway:  ETT - double lumen left with ONE LUNG VENTILATION  Cuffed: Yes    Technique Used for Successful ETT Placement:  Flexible bronchoscopy    Insertion Site:  Oral  Blade Type:  Glide  Laryngoscope Blade/Videolaryngoscope Blade Size:  4  ETT Double Lumen (fr):  39  Measured from:  Teeth  ETT to Teeth (cm):  29  Placement Verified by: auscultation and capnometry    Number of Attempts at Approach:  1

## 2020-06-22 NOTE — OR NURSING
Pt A&OX4. VSS. Pt on 4 L of oxygen via nasal cannula. Pt initially restless upon waking in pacu. IV Demerol given for shivering and Albuterol nebulizer treatment for wheezing. Post IV pain medication administration, pain is tolerable per pt report and pt resting calmly. No complaints of nausea in pacu, scopalamine patch behind R ear.   R chest tube in place to 20 cm of suction, 56 cc of sanguinous drainage marked on collection chamber. Dressing to chest tube CDI.   NG tube to R nare to low continuous suction. Scant sanguinous drainage in tubing.   J-tube to LLQ POA, no dressing to site.   Spring to down drain, statlock in place. 450 cc of straw colored clear urine out.   ART line to L radial artery removed per order. Gauze/tegaderm in place now, CDI.   Pt's daughter, Robert, given update and pt's room assignment.   Report called to ION Ramirez.   Awaiting transport.

## 2020-06-23 ENCOUNTER — APPOINTMENT (OUTPATIENT)
Dept: RADIOLOGY | Facility: MEDICAL CENTER | Age: 65
DRG: 327 | End: 2020-06-23
Attending: PHYSICIAN ASSISTANT
Payer: COMMERCIAL

## 2020-06-23 LAB
ALBUMIN SERPL BCP-MCNC: 2.8 G/DL (ref 3.2–4.9)
ALBUMIN/GLOB SERPL: 1.1 G/DL
ALP SERPL-CCNC: 63 U/L (ref 30–99)
ALT SERPL-CCNC: 18 U/L (ref 2–50)
ANION GAP SERPL CALC-SCNC: 9 MMOL/L (ref 7–16)
AST SERPL-CCNC: 23 U/L (ref 12–45)
BILIRUB SERPL-MCNC: 0.6 MG/DL (ref 0.1–1.5)
BUN SERPL-MCNC: 14 MG/DL (ref 8–22)
CALCIUM SERPL-MCNC: 8 MG/DL (ref 8.5–10.5)
CHLORIDE SERPL-SCNC: 104 MMOL/L (ref 96–112)
CO2 SERPL-SCNC: 23 MMOL/L (ref 20–33)
CREAT SERPL-MCNC: 0.59 MG/DL (ref 0.5–1.4)
ERYTHROCYTE [DISTWIDTH] IN BLOOD BY AUTOMATED COUNT: 58 FL (ref 35.9–50)
GLOBULIN SER CALC-MCNC: 2.5 G/DL (ref 1.9–3.5)
GLUCOSE SERPL-MCNC: 127 MG/DL (ref 65–99)
HCT VFR BLD AUTO: 28.4 % (ref 42–52)
HGB BLD-MCNC: 9.4 G/DL (ref 14–18)
MAGNESIUM SERPL-MCNC: 1.8 MG/DL (ref 1.5–2.5)
MCH RBC QN AUTO: 31.7 PG (ref 27–33)
MCHC RBC AUTO-ENTMCNC: 32.6 G/DL (ref 33.7–35.3)
MCV RBC AUTO: 97.2 FL (ref 81.4–97.8)
PHOSPHATE SERPL-MCNC: 3.7 MG/DL (ref 2.5–4.5)
PLATELET # BLD AUTO: 173 K/UL (ref 164–446)
PMV BLD AUTO: 10 FL (ref 9–12.9)
POTASSIUM SERPL-SCNC: 4.3 MMOL/L (ref 3.6–5.5)
PREALB SERPL-MCNC: 13 MG/DL (ref 18–38)
PROT SERPL-MCNC: 5.3 G/DL (ref 6–8.2)
RBC # BLD AUTO: 2.9 M/UL (ref 4.7–6.1)
SODIUM SERPL-SCNC: 136 MMOL/L (ref 135–145)
WBC # BLD AUTO: 9.9 K/UL (ref 4.8–10.8)

## 2020-06-23 PROCEDURE — 36415 COLL VENOUS BLD VENIPUNCTURE: CPT

## 2020-06-23 PROCEDURE — 700111 HCHG RX REV CODE 636 W/ 250 OVERRIDE (IP): Performed by: PHYSICIAN ASSISTANT

## 2020-06-23 PROCEDURE — 94760 N-INVAS EAR/PLS OXIMETRY 1: CPT

## 2020-06-23 PROCEDURE — 71045 X-RAY EXAM CHEST 1 VIEW: CPT

## 2020-06-23 PROCEDURE — 84134 ASSAY OF PREALBUMIN: CPT

## 2020-06-23 PROCEDURE — A9270 NON-COVERED ITEM OR SERVICE: HCPCS | Performed by: PHYSICIAN ASSISTANT

## 2020-06-23 PROCEDURE — 700102 HCHG RX REV CODE 250 W/ 637 OVERRIDE(OP): Performed by: PHYSICIAN ASSISTANT

## 2020-06-23 PROCEDURE — 700102 HCHG RX REV CODE 250 W/ 637 OVERRIDE(OP): Performed by: SURGERY

## 2020-06-23 PROCEDURE — 85027 COMPLETE CBC AUTOMATED: CPT

## 2020-06-23 PROCEDURE — C9113 INJ PANTOPRAZOLE SODIUM, VIA: HCPCS | Performed by: PHYSICIAN ASSISTANT

## 2020-06-23 PROCEDURE — 83735 ASSAY OF MAGNESIUM: CPT

## 2020-06-23 PROCEDURE — 700105 HCHG RX REV CODE 258: Performed by: PHYSICIAN ASSISTANT

## 2020-06-23 PROCEDURE — 94640 AIRWAY INHALATION TREATMENT: CPT

## 2020-06-23 PROCEDURE — 770001 HCHG ROOM/CARE - MED/SURG/GYN PRIV*

## 2020-06-23 PROCEDURE — 94669 MECHANICAL CHEST WALL OSCILL: CPT

## 2020-06-23 PROCEDURE — 80053 COMPREHEN METABOLIC PANEL: CPT

## 2020-06-23 PROCEDURE — 94664 DEMO&/EVAL PT USE INHALER: CPT

## 2020-06-23 PROCEDURE — 84100 ASSAY OF PHOSPHORUS: CPT

## 2020-06-23 PROCEDURE — A9270 NON-COVERED ITEM OR SERVICE: HCPCS | Performed by: SURGERY

## 2020-06-23 RX ORDER — TEMAZEPAM 15 MG/1
15 CAPSULE ORAL
Status: DISCONTINUED | OUTPATIENT
Start: 2020-06-23 | End: 2020-06-26 | Stop reason: HOSPADM

## 2020-06-23 RX ADMIN — BUDESONIDE AND FORMOTEROL FUMARATE DIHYDRATE 2 PUFF: 160; 4.5 AEROSOL RESPIRATORY (INHALATION) at 09:02

## 2020-06-23 RX ADMIN — LEVOTHYROXINE SODIUM 75 MCG: 0.07 TABLET ORAL at 10:38

## 2020-06-23 RX ADMIN — SODIUM CHLORIDE, POTASSIUM CHLORIDE, SODIUM LACTATE AND CALCIUM CHLORIDE: 600; 310; 30; 20 INJECTION, SOLUTION INTRAVENOUS at 23:58

## 2020-06-23 RX ADMIN — TEMAZEPAM 15 MG: 15 CAPSULE ORAL at 22:36

## 2020-06-23 RX ADMIN — HYDROMORPHONE HYDROCHLORIDE 1 MG: 1 INJECTION, SOLUTION INTRAMUSCULAR; INTRAVENOUS; SUBCUTANEOUS at 14:22

## 2020-06-23 RX ADMIN — PANTOPRAZOLE SODIUM 40 MG: 40 INJECTION, POWDER, LYOPHILIZED, FOR SOLUTION INTRAVENOUS at 04:47

## 2020-06-23 RX ADMIN — LORAZEPAM 1 MG: 2 INJECTION INTRAMUSCULAR; INTRAVENOUS at 04:47

## 2020-06-23 RX ADMIN — CEFAZOLIN 2 G: 10 INJECTION, POWDER, FOR SOLUTION INTRAVENOUS at 04:47

## 2020-06-23 RX ADMIN — BUDESONIDE AND FORMOTEROL FUMARATE DIHYDRATE 2 PUFF: 160; 4.5 AEROSOL RESPIRATORY (INHALATION) at 22:36

## 2020-06-23 RX ADMIN — HYDROMORPHONE HYDROCHLORIDE 1 MG: 1 INJECTION, SOLUTION INTRAMUSCULAR; INTRAVENOUS; SUBCUTANEOUS at 01:02

## 2020-06-23 RX ADMIN — HYDROCODONE BITARTRATE AND ACETAMINOPHEN 15 ML: 7.5; 325 SOLUTION ORAL at 08:01

## 2020-06-23 RX ADMIN — HYDROMORPHONE HYDROCHLORIDE 1 MG: 1 INJECTION, SOLUTION INTRAMUSCULAR; INTRAVENOUS; SUBCUTANEOUS at 23:49

## 2020-06-23 RX ADMIN — SODIUM CHLORIDE, POTASSIUM CHLORIDE, SODIUM LACTATE AND CALCIUM CHLORIDE: 600; 310; 30; 20 INJECTION, SOLUTION INTRAVENOUS at 02:36

## 2020-06-23 RX ADMIN — HYDROCODONE BITARTRATE AND ACETAMINOPHEN 20 ML: 7.5; 325 SOLUTION ORAL at 12:16

## 2020-06-23 RX ADMIN — SODIUM CHLORIDE, POTASSIUM CHLORIDE, SODIUM LACTATE AND CALCIUM CHLORIDE: 600; 310; 30; 20 INJECTION, SOLUTION INTRAVENOUS at 10:43

## 2020-06-23 RX ADMIN — BENAZEPRIL HYDROCHLORIDE 20 MG: 20 TABLET ORAL at 08:02

## 2020-06-23 RX ADMIN — HYDROCODONE BITARTRATE AND ACETAMINOPHEN 15 ML: 7.5; 325 SOLUTION ORAL at 19:39

## 2020-06-23 ASSESSMENT — LIFESTYLE VARIABLES: EVER_SMOKED: YES

## 2020-06-23 NOTE — RESPIRATORY CARE
COPD EDUCATION by COPD CLINICAL EDUCATOR  6/23/2020 at 12:03 PM by Qing Baker, RRT     COPD Education provided?    Patient unable to participate in full program.  We reviewed education packet together about COPD treatments,medications and keeping all clean. We completed a Flutter treatment together and reviewed Spacer technique. Placed a Nursing communication for nebulizer for discharge. He sees [facility]    Action Plan Completed/Updated? Yes at bedside and in EMR    Pulmonary Function Testing (PFT)? 6/2019 in EMR: FVC/FEV1 ratio=70%;FEV1 = 76%    Does patient currently use inhalers/nebulizer at home? Advair Spiriva Albuterol    Additional medication/equipment recommendations  Requested nebulizer and medications for discharge    Is all Respiratory DME in place? Pending above ;BIPAP -0- Oxygen                   If yes, has patient been educated on use of DME?   yes    Have all necessary follow up appointments been scheduled?   PCP or D/C clinic pending with surgeon   Specialty [facility] 9/8 with PFT    Palliative Care Team involved in care, been suggested or consulted? No     Has the patient been referred to Pulmonary Rehab? Encouraged but declined (active Radiation)    Has the patient been referred to the [facility] COPD Program? n/a    Home Health referral placed?   Recommended? Not at this time he is being followed by Oncology Team

## 2020-06-23 NOTE — PROGRESS NOTES
Bedside report received. Assessment completed.  Pt is A&O x4. Pt on 2L NC.   Pain 6/10, medicated PRN per MAR  Denies nausea.   - numbness, - tingling.  NG to R nare to LCS, scant output  Chest tube to R chest, dressing CDI. Chest tube patent  x3 surgical incisions to R flank/chest. Gauze and tegaderm, CDI  x5 lap sites to abd,gauze and tegaderm, CDI.   Last BM 6/21 PTA.  Spring catheter in place draining to gravity. Stat lock on leg.   Strict NPO  Pt up SBA. Tolerates well.   Call light within reach. All needs met at this time. Fall Precautions and hourly rounding in place.

## 2020-06-23 NOTE — ANESTHESIA POSTPROCEDURE EVALUATION
Patient: Norberto Shultzobesaskia    Procedure Summary     Date:  06/22/20 Room / Location:  [facility] OR 09 / SURGERY [facility]    Anesthesia Start:  0932 Anesthesia Stop:  1327    Procedure:  ESOPHAGECTOMY, THORACOSCOPIC - NODE DISSECTION (N/A Abdomen) Diagnosis:  (DISTAL ESOPHAGEAL CANCER, PD ADENOCARCINOMA)    Surgeon:  John H Ganser, M.D. Responsible Provider:  Ramón Dobson M.D.    Anesthesia Type:  general ASA Status:  4          Final Anesthesia Type: general  Last vitals  BP   Blood Pressure: 141/85, Arterial BP: 139/68    Temp   36.7 °C (98 °F)    Pulse   Pulse: 76   Resp   19    SpO2   98 %      Anesthesia Post Evaluation    Patient location during evaluation: PACU  Patient participation: complete - patient participated  Level of consciousness: sleepy but conscious    Airway patency: patent  Anesthetic complications: no  Cardiovascular status: hemodynamically stable  Respiratory status: acceptable and face mask  Hydration status: balanced    PONV: none           Nurse Pain Score: 0 (NPRS)

## 2020-06-23 NOTE — PROGRESS NOTES
Patient complains of racing thoughts and inability to sleep.  Medicated per MAR.   All needs met at this time

## 2020-06-23 NOTE — DISCHARGE PLANNING
Anticipated Discharge Disposition: Home with Resumption of TF's and Nebulizer.    Action: Ashley RN informed DINO, MD will provide a DME Order for a nebulizer. DINO met with the patient at bedside and issue choice for DME, his choice is [facility], choice form provided to [facility].     The patient informed DINO, he is on services with [facility] for TF's, he has the formula and supplies at home.    Barriers to Discharge: Medical Clearance.    Plan: Home with Nebulizer and resumption of TF's, pending MD DME Order for the Nebulizer.

## 2020-06-23 NOTE — DIETARY
"Nutrition Support Assessment:  Day 1 of admit.  Norberto White is a 65 y.o. male with admitting DX of distal esophageal cancer.     Pt s/p esophagectomy, laparoscopic/thoracoscopic and node dissection 6/22.  Pt with pre-existing J-tube and followed by [facility] RD(s).  This RD was able to visit pt at bedside to confirmed home regimen of Pivot 1.5 5 - 7 containers/day, which provides 1755 - 2485 kcal, 110 - 154 gm protein, and 900 - 1260 mL of free water per day.  Discussed how [facility] does not carry Pivot 1.5 however Impact Peptide 1.5 is an appropriate equivalent - pt agreeable to infusing Impact.  Pt reports he feels good today, looking forward to going on a walk.  In regards to his wt, pt reports he weighed ~265 lbs December 2019 and was down to 227 lbs in early April, when he started to see [facility] RD(s).  This is a 14% wt loss over a 3 month period which is severe.  Pt reached his lowest wt the end of April, which was an additional 7.5% wt loss in <1 month.  Pt worked hard w/ [facility] RD(s) and increased his cartons of TF/day and was also taking PO - he started to gain wt back and is now currently 224 lbs this admit.  Pt is pleased with his progress thus far.      RD discussed TF plan w/ RN, TF advancement, and where additional J-tube cap can be found in the event a replacement is needed.    Assessment:  Estimated Nutritional Needs based on:   Height: 188 cm (6' 2.02\")  Weight: 101.7 kg (224 lb 3.3 oz)  Weight to Use in Calculations: 101.7 kg (224 lb 3.3 oz) - via stand up scale.   Ideal Body Weight: 88.9 kg (196 lb)  Body mass index is 28.77 kg/m²., BMI classification: Overweight however does not appear overweight per RD judgement.     Weight History per [facility] RD:  210 lbs on 4/22/20  211 lbs on 4/15/20  221 lbs on 4/8/20  227 lbs on 4/1/20    Calculation/Equation: MSJ x 1.2 = 2255 kcal.  BEE per HBE = 2134 x 1.15 - 1.3 (for wt maintenance) = 2454 - 2774 kcal  Total Calories / day: 2400 - 2700 (Calories " / k - 27)  Total Grams Protein / day: 122 - 153+  (Grams Protein / k.2 - 1.5+)     Evaluation:   1. J-tube for enteral access.  2. Pt hopeful his PO intake will improve - pt reports he was hardly to take anything by mouth for 2 weeks PTA.  3. No outstanding GI distress reported.   4. NGT in place to LCS with scant output.    5. Labs: Glucose: 127, Total protein: 5.3.  6. Meds: Synthroid, Protonix, Lactated ringers @ 125 mL/hr.   7. LBM: .     Malnutrition Risk: Noted with a previous severe wt loss however pt's nutritional status has improved 2/2 TF infusions via J-tube.  Pt feels good.  Pt does not meet malnutrition criteria per ASPEN Guidelines at this time.      Recommendations/Plan:  1. Initiate Impact Peptide 1.5 @ 10 mL/hr and advance as tolerated to goal rate of 70 mL/hr, providing 2520 kcal, 158 gm protein, and 1294 mL of free water per day.  2. Fluids per MD.  3. Monitor for signs of TF intolerance - feel free to call [phone] (M-F) and [phone] (weekends).    4. Continue to monitor weight.  5. Concurrent PO diet as appropriate per Surgery.  6. For d/c purposes: Continue home TF regimen of Pivot 1.5 - recommend 7 containers/day.     RD follows.

## 2020-06-23 NOTE — PROGRESS NOTES
"Progress Note:    S: Doing well  Pain controlled    O:  Recent Labs     06/23/20  0635   WBC 9.9   RBC 2.90*   HEMOGLOBIN 9.4*   HEMATOCRIT 28.4*   MCV 97.2   MCH 31.7   MCHC 32.6*   RDW 58.0*   PLATELETCT 173   MPV 10.0     Recent Labs     06/23/20  0635   SODIUM 136   POTASSIUM 4.3   CHLORIDE 104   CO2 23   GLUCOSE 127*   BUN 14   CREATININE 0.59   CALCIUM 8.0*         Current Facility-Administered Medications   Medication Dose   • benazepril (LOTENSIN) tablet 20 mg  20 mg   • levothyroxine (SYNTHROID) tablet 75 mcg  75 mcg   • nitroglycerin (NITROSTAT) tablet 0.4 mg  0.4 mg   • lactated ringers infusion     • Pharmacy Consult Request ...Pain Management Review 1 Each  1 Each   • ondansetron (ZOFRAN) syringe/vial injection 4 mg  4 mg   • diphenhydrAMINE (BENADRYL) injection 25 mg  25 mg   • scopolamine (TRANSDERM-SCOP) patch 1 Patch  1 Patch   • diphenhydrAMINE (BENADRYL) injection 25 mg  25 mg   • benzocaine-menthol (CEPACOL) lozenge 1 Lozenge  1 Lozenge   • Respiratory Therapy Consult     • enoxaparin (LOVENOX) inj 40 mg  40 mg   • enalaprilat (VASOTEC) injection 2.5 mg  2.5 mg   • hydrALAZINE (APRESOLINE) injection 10 mg  10 mg   • HYDROcodone-acetaminophen 2.5-108 mg/5mL (HYCET) solution 10-20 mL  10-20 mL   • LORazepam (ATIVAN) injection 0.5-1 mg  0.5-1 mg   • ondansetron (ZOFRAN ODT) dispertab 4 mg  4 mg   • pantoprazole (PROTONIX) injection 40 mg  40 mg   • HYDROmorphone pf (DILAUDID) injection 0.2-1 mg  0.2-1 mg   • budesonide-formoterol (SYMBICORT) 160-4.5 MCG/ACT inhaler 2 Puff  2 Puff       PE:  /74   Pulse (!) 107   Temp 37.3 °C (99.2 °F) (Temporal)   Resp 18   Ht 1.88 m (6' 2\")   Wt 101.7 kg (224 lb 3.3 oz)   SpO2 94%     Intake/Output Summary (Last 24 hours) at 6/23/2020 0853  Last data filed at 6/23/2020 0815  Gross per 24 hour   Intake 2254.17 ml   Output 1988 ml   Net 266.17 ml       Chest clear  Heart reg  Chest tube serous  Old blood in NG    Rads:  DX-CHEST-PORTABLE (1 VIEW)   Final " Result         1. Recent esophagectomy and gastric pull-through.   2. Tiny right apical pneumothorax.   3. Bibasilar atelectasis.         DX-CHEST-PORTABLE (1 VIEW)   Final Result   Addendum 1 of 1   These findings were discussed with LEATHA VAUGHN on 6/22/2020 3:34 PM.      Final          A:   Active Hospital Problems    Diagnosis   • Malignant neoplasm of lower third of esophagus (HCC) [C15.5]         P: Ambulate  Get IS and use  UGI tomorrow  Await path    John Ganser M.D.  [facility]

## 2020-06-23 NOTE — CARE PLAN
Problem: Respiratory:  Goal: Respiratory status will improve  Outcome: PROGRESSING AS EXPECTED     Problem: Psychosocial Needs:  Goal: Level of anxiety will decrease  Outcome: PROGRESSING AS EXPECTED     Problem: Urinary Elimination:  Goal: Ability to reestablish a normal urinary elimination pattern will improve  Outcome: PROGRESSING AS EXPECTED     Problem: Communication  Goal: The ability to communicate needs accurately and effectively will improve  Outcome: PROGRESSING AS EXPECTED     Problem: Safety  Goal: Will remain free from injury  Outcome: PROGRESSING AS EXPECTED     Problem: Knowledge Deficit  Goal: Knowledge of disease process/condition, treatment plan, diagnostic tests, and medications will improve  Outcome: PROGRESSING AS EXPECTED

## 2020-06-23 NOTE — CARE PLAN
Problem: Respiratory:  Goal: Respiratory status will improve  Outcome: PROGRESSING AS EXPECTED  Note: Educated on use of IS, no c/o SOB, O2 saturation > 90%     Problem: Pain Management  Goal: Pain level will decrease to patient's comfort goal  Outcome: PROGRESSING AS EXPECTED  Note: PRN pain medication mananging pain well. Pt educated about medications

## 2020-06-23 NOTE — PROGRESS NOTES
Bedside report received.  Assessment complete.  A&O x 4. Patient calls appropriately.  Patient ambulates with standby assist.    Patient has 8/10 pain. Pain managed with prescribed medications.  Denies N&V. Patient on Strict NPO diet.  Surgical lap stabs to abdomen, 5 in total, are dresse with gauze and tegaderm. CDI.  3 incisions noted to Right flank/chest, and chest tube in place. All dressed, all CDI.  NG to Right nare.  + void, sylvester in place with active order  Patient displays no current signs of respiratory distress.  Patient pleasant with staff and resting in bed.  Review plan with of care with patient. Call light and personal belongings with in reach. Hourly rounding in place. All needs met at this time.

## 2020-06-23 NOTE — DISCHARGE PLANNING
Care Transition Team Assessment    LSW met with the patient at bedside. The patient verified the demographic information in the Facesheet.    The patient's admitting Dx is Distal Esophageal Cancer. The patient indicated his physical address is [phone] [place] [address].    The patient stated he lives in a single story home with Nereida, his wife and Robert, their daughter/POA.    The patient reported, his wife has Advanced Dementia, Robert is their caregiver.    The patient stated his PCP is Dr. Alhaji Fenton, he does not own any DME. The patient stated he has been on service with [facility] for Tube Fees since April 2020, he states he has the TF formula and supplies at home.    Based on the information provided by this patient, his discharge disposition is unknown, pending advise from the medical team.    Information Source  Orientation : Oriented x 4  Information Given By: Patient  Informant's Name: (Norberto)  Who is responsible for making decisions for patient? : Patient    Readmission Evaluation  Is this a readmission?: No    Elopement Risk  Legal Hold: No  Ambulatory or Self Mobile in Wheelchair: Yes  Disoriented: No  Psychiatric Symptoms: None  History of Wandering: No  Elopement this Admit: No  Vocalizing Wanting to Leave: No  Displays Behaviors, Body Language Wanting to Leave: No-Not at Risk for Elopement  Elopement Risk: Not at Risk for Elopement    Interdisciplinary Discharge Planning  Patient or legal guardian wants to designate a caregiver (see row info): No    Discharge Preparedness  What is your plan after discharge?: Uncertain - pending medical team collaboration, Home with help  What are your discharge supports?: Child  Prior Functional Level: Needs Assist with Activities of Daily Living    Functional Assessment  Prior Functional Level: Needs Assist with Activities of Daily Living    Finances  Financial Barriers to Discharge: No  Prescription Coverage: Yes    Vision / Hearing  Impairment  Vision Impairment : No  Hearing Impairment : No         Advance Directive  Advance Directive?: DPOA for Health Care  Durable Power of  Name and Contact : (Robert White, Dtr / [employer] [phone])    Domestic Abuse  Have you ever been the victim of abuse or violence?: No  Physical Abuse or Sexual Abuse: No  Verbal Abuse or Emotional Abuse: No  Possible Abuse Reported to:: Not Applicable    Psychological Assessment  History of Substance Abuse: None  History of Psychiatric Problems: No    Discharge Risks or Barriers  Discharge risks or barriers?: No    Anticipated Discharge Information  Anticipated discharge disposition: Discharge needs currently unknown, Home  Discharge Address: ([phone] [place], [place])

## 2020-06-24 ENCOUNTER — APPOINTMENT (OUTPATIENT)
Dept: RADIOLOGY | Facility: MEDICAL CENTER | Age: 65
DRG: 327 | End: 2020-06-24
Attending: SURGERY
Payer: COMMERCIAL

## 2020-06-24 LAB
CRP SERPL HS-MCNC: 6.66 MG/DL (ref 0–0.75)
ERYTHROCYTE [DISTWIDTH] IN BLOOD BY AUTOMATED COUNT: 61.9 FL (ref 35.9–50)
HCT VFR BLD AUTO: 26.9 % (ref 42–52)
HGB BLD-MCNC: 8.4 G/DL (ref 14–18)
MCH RBC QN AUTO: 31.6 PG (ref 27–33)
MCHC RBC AUTO-ENTMCNC: 31.2 G/DL (ref 33.7–35.3)
MCV RBC AUTO: 101.1 FL (ref 81.4–97.8)
PLATELET # BLD AUTO: 171 K/UL (ref 164–446)
PMV BLD AUTO: 10.1 FL (ref 9–12.9)
PREALB SERPL-MCNC: 11.8 MG/DL (ref 18–38)
RBC # BLD AUTO: 2.66 M/UL (ref 4.7–6.1)
WBC # BLD AUTO: 9.6 K/UL (ref 4.8–10.8)

## 2020-06-24 PROCEDURE — 770001 HCHG ROOM/CARE - MED/SURG/GYN PRIV*

## 2020-06-24 PROCEDURE — 74240 X-RAY XM UPR GI TRC 1CNTRST: CPT

## 2020-06-24 PROCEDURE — A9270 NON-COVERED ITEM OR SERVICE: HCPCS | Performed by: SURGERY

## 2020-06-24 PROCEDURE — 94640 AIRWAY INHALATION TREATMENT: CPT

## 2020-06-24 PROCEDURE — 700102 HCHG RX REV CODE 250 W/ 637 OVERRIDE(OP): Performed by: PHYSICIAN ASSISTANT

## 2020-06-24 PROCEDURE — C9113 INJ PANTOPRAZOLE SODIUM, VIA: HCPCS | Performed by: PHYSICIAN ASSISTANT

## 2020-06-24 PROCEDURE — 84134 ASSAY OF PREALBUMIN: CPT

## 2020-06-24 PROCEDURE — 700117 HCHG RX CONTRAST REV CODE 255: Performed by: SURGERY

## 2020-06-24 PROCEDURE — 700105 HCHG RX REV CODE 258: Performed by: PHYSICIAN ASSISTANT

## 2020-06-24 PROCEDURE — 94760 N-INVAS EAR/PLS OXIMETRY 1: CPT

## 2020-06-24 PROCEDURE — 700111 HCHG RX REV CODE 636 W/ 250 OVERRIDE (IP): Performed by: PHYSICIAN ASSISTANT

## 2020-06-24 PROCEDURE — 85027 COMPLETE CBC AUTOMATED: CPT

## 2020-06-24 PROCEDURE — A9270 NON-COVERED ITEM OR SERVICE: HCPCS | Performed by: PHYSICIAN ASSISTANT

## 2020-06-24 PROCEDURE — 86140 C-REACTIVE PROTEIN: CPT

## 2020-06-24 PROCEDURE — 700102 HCHG RX REV CODE 250 W/ 637 OVERRIDE(OP): Performed by: SURGERY

## 2020-06-24 PROCEDURE — 36415 COLL VENOUS BLD VENIPUNCTURE: CPT

## 2020-06-24 RX ADMIN — HYDROCODONE BITARTRATE AND ACETAMINOPHEN 15 ML: 7.5; 325 SOLUTION ORAL at 18:53

## 2020-06-24 RX ADMIN — HYDROCODONE BITARTRATE AND ACETAMINOPHEN 15 ML: 7.5; 325 SOLUTION ORAL at 13:00

## 2020-06-24 RX ADMIN — LEVOTHYROXINE SODIUM 75 MCG: 0.07 TABLET ORAL at 05:13

## 2020-06-24 RX ADMIN — SODIUM CHLORIDE, POTASSIUM CHLORIDE, SODIUM LACTATE AND CALCIUM CHLORIDE: 600; 310; 30; 20 INJECTION, SOLUTION INTRAVENOUS at 17:08

## 2020-06-24 RX ADMIN — TEMAZEPAM 15 MG: 15 CAPSULE ORAL at 22:23

## 2020-06-24 RX ADMIN — PHENOL 1 SPRAY: 1.5 LIQUID ORAL at 15:37

## 2020-06-24 RX ADMIN — IOHEXOL 100 ML: 300 INJECTION, SOLUTION INTRAVENOUS at 08:35

## 2020-06-24 RX ADMIN — PANTOPRAZOLE SODIUM 40 MG: 40 INJECTION, POWDER, LYOPHILIZED, FOR SOLUTION INTRAVENOUS at 05:14

## 2020-06-24 RX ADMIN — HYDROMORPHONE HYDROCHLORIDE 1 MG: 1 INJECTION, SOLUTION INTRAMUSCULAR; INTRAVENOUS; SUBCUTANEOUS at 20:29

## 2020-06-24 RX ADMIN — BUDESONIDE AND FORMOTEROL FUMARATE DIHYDRATE 2 PUFF: 160; 4.5 AEROSOL RESPIRATORY (INHALATION) at 05:13

## 2020-06-24 RX ADMIN — HYDROMORPHONE HYDROCHLORIDE 1 MG: 1 INJECTION, SOLUTION INTRAMUSCULAR; INTRAVENOUS; SUBCUTANEOUS at 09:43

## 2020-06-24 RX ADMIN — DIPHENHYDRAMINE HYDROCHLORIDE 25 MG: 50 INJECTION INTRAMUSCULAR; INTRAVENOUS at 22:23

## 2020-06-24 RX ADMIN — HYDROCODONE BITARTRATE AND ACETAMINOPHEN 15 ML: 7.5; 325 SOLUTION ORAL at 05:13

## 2020-06-24 RX ADMIN — PHENOL 1 SPRAY: 1.5 LIQUID ORAL at 20:34

## 2020-06-24 RX ADMIN — BUDESONIDE AND FORMOTEROL FUMARATE DIHYDRATE 2 PUFF: 160; 4.5 AEROSOL RESPIRATORY (INHALATION) at 20:27

## 2020-06-24 RX ADMIN — PHENOL 1 SPRAY: 1.5 LIQUID ORAL at 13:05

## 2020-06-24 NOTE — DISCHARGE PLANNING
Anticipated Discharge Disposition: Home with Resumption of TF's and Nebulizer.    Action: DINO spoke with ION Ramirez regarding the DME Order. Per RN, MD is aware of the need to provide the order and F2F.    Barriers to Discharge: Medical Clearance.    Plan: Home with DME, pending order and F2F.

## 2020-06-24 NOTE — PROGRESS NOTES
Bedside report received. Assessment completed.  Pt is A&O x4. Pt on 2 L NC. No c/o SOB. Pt motivated to use IS.    Pain 3/10. Previously medicated this morning.  Denies nausea.   - numbness, - tingling.  J tube to LLQ. Previously running impact peptide at 50 mL/hr. Goal rate of 70 mL/hr. Pt tolerated well.   NG to R nare to LCS, scant output  Chest tube to R chest, dressing CDI. + output.   x3 surgical incisions to R flank/chest. Gauze and tegaderm, CDI  x5 lap sites to abd,gauze and tegaderm, CDI.   Last BM 6/21 PTA. - flatus.   Spring catheter in place draining to gravity. Stat lock on leg. Orders to removed today. Pt educated.    Strict NPO.  Pt up SBA. Steady gait.Tolerates well.   Call light within reach. All needs met at this time. Fall Precautions and hourly rounding in place.

## 2020-06-24 NOTE — CARE PLAN
Problem: Respiratory:  Goal: Respiratory status will improve  Outcome: PROGRESSING AS EXPECTED     Problem: Psychosocial Needs:  Goal: Level of anxiety will decrease  Outcome: PROGRESSING AS EXPECTED     Problem: Communication  Goal: The ability to communicate needs accurately and effectively will improve  Outcome: PROGRESSING AS EXPECTED     Problem: Safety  Goal: Will remain free from injury  Outcome: PROGRESSING AS EXPECTED     Problem: Knowledge Deficit  Goal: Knowledge of disease process/condition, treatment plan, diagnostic tests, and medications will improve  Outcome: PROGRESSING AS EXPECTED     Problem: Pain Management  Goal: Pain level will decrease to patient's comfort goal  Outcome: PROGRESSING AS EXPECTED

## 2020-06-24 NOTE — PROGRESS NOTES
NG tube removed per order. Pt tolerated well. Ice chips okay per order. All needs met at this time.

## 2020-06-24 NOTE — CARE PLAN
Problem: Respiratory:  Goal: Respiratory status will improve  Outcome: PROGRESSING AS EXPECTED  Note: CT in place, no c/o SOB, pt educated on use of IS, shows effective use, strong cough, 2-3L NC     Problem: Bowel/Gastric:  Goal: Normal bowel function is maintained or improved  Outcome: PROGRESSING AS EXPECTED  Note: Tolerating tube feed, educated about NPO status, NG to R nare to LCS, - flatus, - BM

## 2020-06-24 NOTE — RESPIRATORY CARE
" COPD EDUCATION by COPD CLINICAL EDUCATOR  6/24/2020 at 3:37 PM by Amanda Henley, RRT     Patient reviewed by COPD education team. Recommended to visit patient for any follow up questions about COPD program or about previous education. Patient does not have any questions at this time and states \"I've had COPD for a long time and doing fine\".   "

## 2020-06-24 NOTE — PROGRESS NOTES
Patient back to floor. Tube feed resumed at goal rate of 70 mL/ hr and IVF reduced to 55 mL/hr to meet order of TF + IVF = 125 mL/hr    Spring d/c at 0950. Pt tolerated well and educated about voiding by 1550. Urinal at bedside. All needs met at this time.

## 2020-06-24 NOTE — PROGRESS NOTES
Bedside report received.  Assessment complete.  A&O x 4. Patient calls appropriately.  Patient ambulates with standby assist.    Patient has 8/10 pain. Pain managed with prescribed medications.  Denies N&V. Patient on Strict NPO diet. Started on Impact peptide. Tolerating.  Surgical lap stabs to abdomen, 5 in total, are dressed with gauze and tegaderm. CDI.  3 incisions noted to Right flank/chest, and chest tube in place, no leaks noted at this time. All dressed, all CDI. Chest tube dressing changed per protocol. CDI.  NG to Right nare.  + void, sylvester in place with active order  Patient displays no current signs of respiratory distress.  Patient pleasant with staff and resting in bed.  Review plan with of care with patient. Call light and personal belongings with in reach. Hourly rounding in place. All needs met at this time.

## 2020-06-24 NOTE — PROGRESS NOTES
"Progress Note:    S: No problems overnight  Pain controlled    O:  Recent Labs     06/23/20  0635 06/24/20  0444   WBC 9.9 9.6   RBC 2.90* 2.66*   HEMOGLOBIN 9.4* 8.4*   HEMATOCRIT 28.4* 26.9*   MCV 97.2 101.1*   MCH 31.7 31.6   MCHC 32.6* 31.2*   RDW 58.0* 61.9*   PLATELETCT 173 171   MPV 10.0 10.1     Recent Labs     06/23/20  0635   SODIUM 136   POTASSIUM 4.3   CHLORIDE 104   CO2 23   GLUCOSE 127*   BUN 14   CREATININE 0.59   CALCIUM 8.0*         Current Facility-Administered Medications   Medication Dose   • temazepam (RESTORIL) capsule 15 mg  15 mg   • benazepril (LOTENSIN) tablet 20 mg  20 mg   • levothyroxine (SYNTHROID) tablet 75 mcg  75 mcg   • nitroglycerin (NITROSTAT) tablet 0.4 mg  0.4 mg   • lactated ringers infusion     • Pharmacy Consult Request ...Pain Management Review 1 Each  1 Each   • ondansetron (ZOFRAN) syringe/vial injection 4 mg  4 mg   • diphenhydrAMINE (BENADRYL) injection 25 mg  25 mg   • scopolamine (TRANSDERM-SCOP) patch 1 Patch  1 Patch   • diphenhydrAMINE (BENADRYL) injection 25 mg  25 mg   • benzocaine-menthol (CEPACOL) lozenge 1 Lozenge  1 Lozenge   • Respiratory Therapy Consult     • enoxaparin (LOVENOX) inj 40 mg  40 mg   • enalaprilat (VASOTEC) injection 2.5 mg  2.5 mg   • hydrALAZINE (APRESOLINE) injection 10 mg  10 mg   • HYDROcodone-acetaminophen 2.5-108 mg/5mL (HYCET) solution 10-20 mL  10-20 mL   • LORazepam (ATIVAN) injection 0.5-1 mg  0.5-1 mg   • ondansetron (ZOFRAN ODT) dispertab 4 mg  4 mg   • pantoprazole (PROTONIX) injection 40 mg  40 mg   • HYDROmorphone pf (DILAUDID) injection 0.2-1 mg  0.2-1 mg   • budesonide-formoterol (SYMBICORT) 160-4.5 MCG/ACT inhaler 2 Puff  2 Puff       PE:  /64   Pulse 73   Temp 36.7 °C (98.1 °F) (Temporal)   Resp 17   Ht 1.88 m (6' 2.02\")   Wt 101.7 kg (224 lb 3.3 oz)   SpO2 97%     Intake/Output Summary (Last 24 hours) at 6/24/2020 1224  Last data filed at 6/24/2020 1115  Gross per 24 hour   Intake 2078.34 ml   Output 2021 ml "   Net 57.34 ml       Chest tube: serosanguinous  NG minimal output  Abd soft  Chest clear  Heart regular    Rads:  DX-UPPER GI-SERIES WITH KUB   Final Result         1. RECENT ESOPHAGECTOMY AND GASTRIC PULL-THROUGH. NO ANASTOMOTIC LEAK.      2. DELAYED GASTRIC EMPTYING. ONLY MINIMAL AMOUNT OF CONTRAST IS SEEN PASSING FROM THE STOMACH INTO THE PROXIMAL SMALL BOWEL 15 MINUTES AFTER ADMINISTRATION.      DX-CHEST-PORTABLE (1 VIEW)   Final Result         1. Recent esophagectomy and gastric pull-through.   2. Tiny right apical pneumothorax.   3. Bibasilar atelectasis.         DX-CHEST-PORTABLE (1 VIEW)   Final Result   Addendum 1 of 1   These findings were discussed with LEATHA VAUGHN on 6/22/2020 3:34 PM.      Final          A:   Active Hospital Problems    Diagnosis   • Malignant neoplasm of lower third of esophagus (HCC) [C15.5]         P: UGI shows no leak, slow gastric emptying  Remove NG  Ice chips  Ambulate/IS  Await path    John Ganser M.D.  [facility]

## 2020-06-25 LAB
ANION GAP SERPL CALC-SCNC: 7 MMOL/L (ref 7–16)
BUN SERPL-MCNC: 17 MG/DL (ref 8–22)
CALCIUM SERPL-MCNC: 8 MG/DL (ref 8.5–10.5)
CHLORIDE SERPL-SCNC: 100 MMOL/L (ref 96–112)
CO2 SERPL-SCNC: 26 MMOL/L (ref 20–33)
CREAT SERPL-MCNC: 0.44 MG/DL (ref 0.5–1.4)
ERYTHROCYTE [DISTWIDTH] IN BLOOD BY AUTOMATED COUNT: 59.7 FL (ref 35.9–50)
GLUCOSE SERPL-MCNC: 112 MG/DL (ref 65–99)
HCT VFR BLD AUTO: 24.1 % (ref 42–52)
HGB BLD-MCNC: 7.8 G/DL (ref 14–18)
MCH RBC QN AUTO: 32 PG (ref 27–33)
MCHC RBC AUTO-ENTMCNC: 32.4 G/DL (ref 33.7–35.3)
MCV RBC AUTO: 98.8 FL (ref 81.4–97.8)
PLATELET # BLD AUTO: 161 K/UL (ref 164–446)
PMV BLD AUTO: 10 FL (ref 9–12.9)
POTASSIUM SERPL-SCNC: 3.8 MMOL/L (ref 3.6–5.5)
RBC # BLD AUTO: 2.44 M/UL (ref 4.7–6.1)
SODIUM SERPL-SCNC: 133 MMOL/L (ref 135–145)
WBC # BLD AUTO: 9.1 K/UL (ref 4.8–10.8)

## 2020-06-25 PROCEDURE — 700105 HCHG RX REV CODE 258: Performed by: PHYSICIAN ASSISTANT

## 2020-06-25 PROCEDURE — A9270 NON-COVERED ITEM OR SERVICE: HCPCS | Performed by: PHYSICIAN ASSISTANT

## 2020-06-25 PROCEDURE — 85027 COMPLETE CBC AUTOMATED: CPT

## 2020-06-25 PROCEDURE — 700111 HCHG RX REV CODE 636 W/ 250 OVERRIDE (IP): Performed by: PHYSICIAN ASSISTANT

## 2020-06-25 PROCEDURE — 36415 COLL VENOUS BLD VENIPUNCTURE: CPT

## 2020-06-25 PROCEDURE — 700102 HCHG RX REV CODE 250 W/ 637 OVERRIDE(OP): Performed by: SURGERY

## 2020-06-25 PROCEDURE — A9270 NON-COVERED ITEM OR SERVICE: HCPCS | Performed by: SURGERY

## 2020-06-25 PROCEDURE — 700102 HCHG RX REV CODE 250 W/ 637 OVERRIDE(OP): Performed by: PHYSICIAN ASSISTANT

## 2020-06-25 PROCEDURE — 770001 HCHG ROOM/CARE - MED/SURG/GYN PRIV*

## 2020-06-25 PROCEDURE — C9113 INJ PANTOPRAZOLE SODIUM, VIA: HCPCS | Performed by: PHYSICIAN ASSISTANT

## 2020-06-25 PROCEDURE — 80048 BASIC METABOLIC PNL TOTAL CA: CPT

## 2020-06-25 RX ADMIN — ENOXAPARIN SODIUM 40 MG: 40 INJECTION SUBCUTANEOUS at 08:47

## 2020-06-25 RX ADMIN — HYDROCODONE BITARTRATE AND ACETAMINOPHEN 20 ML: 7.5; 325 SOLUTION ORAL at 03:08

## 2020-06-25 RX ADMIN — LEVOTHYROXINE SODIUM 75 MCG: 0.07 TABLET ORAL at 05:21

## 2020-06-25 RX ADMIN — PHENOL 1 SPRAY: 1.5 LIQUID ORAL at 05:21

## 2020-06-25 RX ADMIN — BUDESONIDE AND FORMOTEROL FUMARATE DIHYDRATE 2 PUFF: 160; 4.5 AEROSOL RESPIRATORY (INHALATION) at 05:21

## 2020-06-25 RX ADMIN — HYDROMORPHONE HYDROCHLORIDE 1 MG: 1 INJECTION, SOLUTION INTRAMUSCULAR; INTRAVENOUS; SUBCUTANEOUS at 13:51

## 2020-06-25 RX ADMIN — SODIUM CHLORIDE, POTASSIUM CHLORIDE, SODIUM LACTATE AND CALCIUM CHLORIDE: 600; 310; 30; 20 INJECTION, SOLUTION INTRAVENOUS at 08:48

## 2020-06-25 RX ADMIN — HYDROMORPHONE HYDROCHLORIDE 1 MG: 1 INJECTION, SOLUTION INTRAMUSCULAR; INTRAVENOUS; SUBCUTANEOUS at 05:41

## 2020-06-25 RX ADMIN — HYDROCODONE BITARTRATE AND ACETAMINOPHEN 20 ML: 7.5; 325 SOLUTION ORAL at 20:50

## 2020-06-25 RX ADMIN — HYDROCODONE BITARTRATE AND ACETAMINOPHEN 20 ML: 7.5; 325 SOLUTION ORAL at 16:47

## 2020-06-25 RX ADMIN — TEMAZEPAM 15 MG: 15 CAPSULE ORAL at 22:40

## 2020-06-25 RX ADMIN — DIPHENHYDRAMINE HYDROCHLORIDE 25 MG: 50 INJECTION INTRAMUSCULAR; INTRAVENOUS at 22:40

## 2020-06-25 RX ADMIN — BUDESONIDE AND FORMOTEROL FUMARATE DIHYDRATE 2 PUFF: 160; 4.5 AEROSOL RESPIRATORY (INHALATION) at 16:52

## 2020-06-25 RX ADMIN — PANTOPRAZOLE SODIUM 40 MG: 40 INJECTION, POWDER, LYOPHILIZED, FOR SOLUTION INTRAVENOUS at 05:22

## 2020-06-25 RX ADMIN — HYDROMORPHONE HYDROCHLORIDE 1 MG: 1 INJECTION, SOLUTION INTRAMUSCULAR; INTRAVENOUS; SUBCUTANEOUS at 08:49

## 2020-06-25 RX ADMIN — HYDROCODONE BITARTRATE AND ACETAMINOPHEN 20 ML: 7.5; 325 SOLUTION ORAL at 11:30

## 2020-06-25 ASSESSMENT — ENCOUNTER SYMPTOMS
NAUSEA: 0
FEVER: 0
VOMITING: 0
ABDOMINAL PAIN: 1
SHORTNESS OF BREATH: 0
CHILLS: 0

## 2020-06-25 NOTE — CARE PLAN
Problem: Nutritional:  Goal: Nutrition support tolerated and meeting greater than 85% of estimated needs  Outcome: MET  Impact Peptide 1.5 @ 70 mL/hr.

## 2020-06-25 NOTE — PROGRESS NOTES
Pt laying in bed, call light within reach, bed lowered and locked, fall education reinforced. Pt is A&O4 and on 2L of oxygen via nasal cannula. Pt has a home CPAP at the bedside. Pt lung sounds are diminished in the lower lobes, bowel sounds are normoactive in all four quadrants, heart sounds are within defined limits. PT IV is clean,dry,intact,and patent and infusing the appropriate fluids. Pt has a right sided chest tube to low suction of -20 with drainage of moderate serosanguinous drainage and dressing of gauze and tape CDI. Pt has a LLQ J tube infusing impact peptide at 70 mL/hr which is goal with no complications. Pt has 5 abdominal lap sites with gauze and tegaderm CDI. Pt has right flank 3 incisions with gauze and tegaderm CDI. Pt is up stand-by with a FWW. Pt maintained on NPO diet

## 2020-06-25 NOTE — CARE PLAN
Problem: Communication  Goal: The ability to communicate needs accurately and effectively will improve  Outcome: PROGRESSING AS EXPECTED  Note: Call light is within reach. Patient encouraged to communicate any needs or concerns with health care team.      Problem: Safety  Goal: Will remain free from injury  Outcome: PROGRESSING AS EXPECTED  Note: Patients belongings and call light within reach. Patient understands importance of using call light.

## 2020-06-25 NOTE — PROGRESS NOTES
Surgical Progress Note    Author: ARLEEN Degroot Date & Time created: 2020   8:35 AM     Interval Events:  S/p  Minimally invasive esophagectomy with gastric pullthrough,   Lymphadenectomy - POD#3, doing well; joseph sips/chips without dyphagia or N/V; ambulatory; using IS    Review of Systems   Constitutional: Negative for chills and fever.   Respiratory: Negative for shortness of breath.    Gastrointestinal: Positive for abdominal pain. Negative for nausea and vomiting.     Hemodynamics:  Temp (24hrs), Av.1 °C (98.7 °F), Min:36.6 °C (97.9 °F), Max:37.4 °C (99.4 °F)  Temperature: 37.2 °C (99 °F)  Pulse  Av.7  Min: 67  Max: 117   Blood Pressure: (!) 98/61     Respiratory:    Respiration: 17, Pulse Oximetry: 95 %     $ MDI/DPI Given: MDI/DPI x 1, Work Of Breathing / Effort: Mild  RUL Breath Sounds: Clear, RML Breath Sounds: Diminished, RLL Breath Sounds: Diminished, ESEQUIEL Breath Sounds: Clear, LLL Breath Sounds: Diminished  Neuro:  GCS       Fluids:    Intake/Output Summary (Last 24 hours) at 2020 0835  Last data filed at 2020 0400  Gross per 24 hour   Intake 2876.5 ml   Output 1575 ml   Net 1301.5 ml        Current Diet Order   Procedures   • Diet NPO     Physical Exam  Vitals signs and nursing note reviewed.   Constitutional:       General: He is not in acute distress.  Cardiovascular:      Rate and Rhythm: Normal rate.   Pulmonary:      Effort: Pulmonary effort is normal. No respiratory distress.      Comments: Chest tube in place, ~ 300ml serous output/24 hrs  No airleak seen  Wounds c/d/i  Abdominal:      Palpations: Abdomen is soft.      Comments: J-tube in place  Teg's c/d/i   Skin:     General: Skin is warm and dry.   Neurological:      Mental Status: He is alert and oriented to person, place, and time.   Psychiatric:         Mood and Affect: Mood normal.       Labs:  Recent Results (from the past 24 hour(s))   Basic Metabolic Panel    Collection Time: 20  4:59 AM   Result Value  Ref Range    Sodium 133 (L) 135 - 145 mmol/L    Potassium 3.8 3.6 - 5.5 mmol/L    Chloride 100 96 - 112 mmol/L    Co2 26 20 - 33 mmol/L    Glucose 112 (H) 65 - 99 mg/dL    Bun 17 8 - 22 mg/dL    Creatinine 0.44 (L) 0.50 - 1.40 mg/dL    Calcium 8.0 (L) 8.5 - 10.5 mg/dL    Anion Gap 7.0 7.0 - 16.0   CBC WITHOUT DIFFERENTIAL    Collection Time: 06/25/20  4:59 AM   Result Value Ref Range    WBC 9.1 4.8 - 10.8 K/uL    RBC 2.44 (L) 4.70 - 6.10 M/uL    Hemoglobin 7.8 (L) 14.0 - 18.0 g/dL    Hematocrit 24.1 (L) 42.0 - 52.0 %    MCV 98.8 (H) 81.4 - 97.8 fL    MCH 32.0 27.0 - 33.0 pg    MCHC 32.4 (L) 33.7 - 35.3 g/dL    RDW 59.7 (H) 35.9 - 50.0 fL    Platelet Count 161 (L) 164 - 446 K/uL    MPV 10.0 9.0 - 12.9 fL   ESTIMATED GFR    Collection Time: 06/25/20  4:59 AM   Result Value Ref Range    GFR If African American >60 >60 mL/min/1.73 m 2    GFR If Non African American >60 >60 mL/min/1.73 m 2     Medical Decision Making, by Problem:  Active Hospital Problems    Diagnosis   • Malignant neoplasm of lower third of esophagus (HCC) [C15.5]     Plan:  ADAT to non-carbonated clear liquids.  IMPT: Please mobilize QID & encourage frequent IS  Will check CBC in AM (labs noted)  Continue chest tube  Pt counseled    Quality Measures:  Quality-Core Measures   Reviewed items::  Medications reviewed and Labs reviewed  Spring catheter::  No Spring  DVT prophylaxis pharmacological::  Enoxaparin (Lovenox)  DVT prophylaxis - mechanical:  SCDs  Ulcer Prophylaxis::  Yes      Discussed patient condition with RN, Patient and Dr. Ganser

## 2020-06-25 NOTE — PROGRESS NOTES
Bedside report received. Assessment completed.  Pt is A&O x4. Pt on 3 L nasal cannula.   Medicating for pain PRN per MAR Pain 6/10  Denies nausea and vomiting.   - numbness, - tingling.  3 x R flank incision sites. Dressing CDI  5x abdomen lap sites. Dressing CDI   18 R FA  Chest tube running to suction at -20cm. No air leak, dressing CDI. Serosanguinous output.   Last BM PTA . -flatus,   +void.  Advanced to clear diet per PA.   Pt has not ambulated this morning. Explained importance of ambulation.   Call light and belongings within reach. All needs met at this time. Bed locked and in lowest position. Fall Precautions and hourly rounding in place.

## 2020-06-26 VITALS
OXYGEN SATURATION: 90 % | RESPIRATION RATE: 18 BRPM | HEIGHT: 74 IN | TEMPERATURE: 98.4 F | BODY MASS INDEX: 28.77 KG/M2 | DIASTOLIC BLOOD PRESSURE: 67 MMHG | WEIGHT: 224.21 LBS | HEART RATE: 95 BPM | SYSTOLIC BLOOD PRESSURE: 122 MMHG

## 2020-06-26 LAB
ERYTHROCYTE [DISTWIDTH] IN BLOOD BY AUTOMATED COUNT: 59.2 FL (ref 35.9–50)
HCT VFR BLD AUTO: 25.9 % (ref 42–52)
HGB BLD-MCNC: 8.4 G/DL (ref 14–18)
MCH RBC QN AUTO: 31.9 PG (ref 27–33)
MCHC RBC AUTO-ENTMCNC: 32.4 G/DL (ref 33.7–35.3)
MCV RBC AUTO: 98.5 FL (ref 81.4–97.8)
PLATELET # BLD AUTO: 163 K/UL (ref 164–446)
PMV BLD AUTO: 9.8 FL (ref 9–12.9)
RBC # BLD AUTO: 2.63 M/UL (ref 4.7–6.1)
WBC # BLD AUTO: 7.9 K/UL (ref 4.8–10.8)

## 2020-06-26 PROCEDURE — 94669 MECHANICAL CHEST WALL OSCILL: CPT

## 2020-06-26 PROCEDURE — A9270 NON-COVERED ITEM OR SERVICE: HCPCS | Performed by: SURGERY

## 2020-06-26 PROCEDURE — 700102 HCHG RX REV CODE 250 W/ 637 OVERRIDE(OP): Performed by: PHYSICIAN ASSISTANT

## 2020-06-26 PROCEDURE — 36415 COLL VENOUS BLD VENIPUNCTURE: CPT

## 2020-06-26 PROCEDURE — 85027 COMPLETE CBC AUTOMATED: CPT

## 2020-06-26 PROCEDURE — 700102 HCHG RX REV CODE 250 W/ 637 OVERRIDE(OP): Performed by: SURGERY

## 2020-06-26 PROCEDURE — A9270 NON-COVERED ITEM OR SERVICE: HCPCS | Performed by: PHYSICIAN ASSISTANT

## 2020-06-26 PROCEDURE — 700105 HCHG RX REV CODE 258: Performed by: PHYSICIAN ASSISTANT

## 2020-06-26 PROCEDURE — 700111 HCHG RX REV CODE 636 W/ 250 OVERRIDE (IP): Performed by: PHYSICIAN ASSISTANT

## 2020-06-26 RX ADMIN — BUDESONIDE AND FORMOTEROL FUMARATE DIHYDRATE 2 PUFF: 160; 4.5 AEROSOL RESPIRATORY (INHALATION) at 04:38

## 2020-06-26 RX ADMIN — PHENOL 1 SPRAY: 1.5 LIQUID ORAL at 04:38

## 2020-06-26 RX ADMIN — LEVOTHYROXINE SODIUM 75 MCG: 0.07 TABLET ORAL at 04:38

## 2020-06-26 RX ADMIN — OMEPRAZOLE 40 MG: KIT at 04:38

## 2020-06-26 RX ADMIN — HYDROCODONE BITARTRATE AND ACETAMINOPHEN 20 ML: 7.5; 325 SOLUTION ORAL at 16:06

## 2020-06-26 RX ADMIN — ENOXAPARIN SODIUM 40 MG: 40 INJECTION SUBCUTANEOUS at 07:59

## 2020-06-26 RX ADMIN — SODIUM CHLORIDE, POTASSIUM CHLORIDE, SODIUM LACTATE AND CALCIUM CHLORIDE: 600; 310; 30; 20 INJECTION, SOLUTION INTRAVENOUS at 02:22

## 2020-06-26 RX ADMIN — HYDROCODONE BITARTRATE AND ACETAMINOPHEN 20 ML: 7.5; 325 SOLUTION ORAL at 04:49

## 2020-06-26 ASSESSMENT — ENCOUNTER SYMPTOMS
ABDOMINAL PAIN: 1
COUGH: 1
CHILLS: 0
SHORTNESS OF BREATH: 0
VOMITING: 0
NAUSEA: 0
FEVER: 0

## 2020-06-26 NOTE — DISCHARGE INSTRUCTIONS
Discharge Instructions    Discharged to home by car with relative. Discharged via wheelchair, hospital escort: Yes.  Special equipment needed: Not Applicable    Be sure to schedule a follow-up appointment with your primary care doctor or any specialists as instructed.     Discharge Plan:   Diet Plan: Discussed  Activity Level: Discussed  Confirmed Follow up Appointment: Patient to Call and Schedule Appointment  Confirmed Symptoms Management: Discussed  Medication Reconciliation Updated: Yes    I understand that a diet low in cholesterol, fat, and sodium is recommended for good health. Unless I have been given specific instructions below for another diet, I accept this instruction as my diet prescription.   Other diet: Full liquid and tube feed    Special Instructions: None    · Is patient discharged on Warfarin / Coumadin?   No     Depression / Suicide Risk    As you are discharged from this [facility] facility, it is important to learn how to keep safe from harming yourself.    Recognize the warning signs:  · Abrupt changes in personality, positive or negative- including increase in energy   · Giving away possessions  · Change in eating patterns- significant weight changes-  positive or negative  · Change in sleeping patterns- unable to sleep or sleeping all the time   · Unwillingness or inability to communicate  · Depression  · Unusual sadness, discouragement and loneliness  · Talk of wanting to die  · Neglect of personal appearance   · Rebelliousness- reckless behavior  · Withdrawal from people/activities they love  · Confusion- inability to concentrate     If you or a loved one observes any of these behaviors or has concerns about self-harm, here's what you can do:  · Talk about it- your feelings and reasons for harming yourself  · Remove any means that you might use to hurt yourself (examples: pills, rope, extension cords, firearm)  · Get professional help from the community (Mental Health, Substance Abuse,  psychological counseling)  · Do not be alone:Call your Safe Contact- someone whom you trust who will be there for you.  · Call your local CRISIS HOTLINE 710-0011 or 837-053-8675  · Call your local Children's Mobile Crisis Response Team [place] (079) 345-7344 or www."[employer]  · Call the toll free National Suicide Prevention Hotlines   · National Suicide Prevention Lifeline 625-979-OYBM (1218)  · [place] Hope Line Network 800-SUICIDE (972-4017)    Discharge Instructions for Esophagectomy   You had a procedure called esophagectomy. This means that part or all of your esophagus was removed. After this type of surgery, it often takes a few months for eating habits to return to normal. Here's what you can do at home to help with your recovery.    Diet changes  Follow the diet your healthcare provider prescribed for you.  Choose foods that are soft and moist. They may be easier to digest.  Don't eat foods that produce gas. This includes broccoli, cabbage, cauliflower, corn, dried beans, lentils, onions, and peas.  Don't eat spicy foods or any foods that cause indigestion.    How to eat  Eat small, frequent meals (6 to 8 times per day).  Eat your last meal or snack at least 2 to 3 hours before you go to bed.  Take small bites, and chew your food well.  Sit up straight when you eat. This way, gravity can help food move through your digestive tract.  Keep sitting upright for 30 to 60 minutes after you eat.  Don’t use a straw, smoke, or chew gum. These activities make you swallow air, which can increase gas.    Drinking fluids  Drink most of your fluids between meals. Limit your fluid with meals to ½ cup (4 ounces).  When you eat snacks, limit fluids you drink with them to 1 cup (8 ounces).    Other home care  Check your incision site daily for 1 week after discharge. Change the dressing according to the directions you were given.   Use pain medicine as needed. But try not to take pain relievers for longer than  4 to 7 days. To prevent constipation while using the pain medicine, take stool softeners.   If you were sent home with antibiotics, take them as prescribed. Finish all of the medicine, even if you feel better.  Crush all pills to make swallowing easier.  Bathe and shower as usual.  Weigh yourself a few times a week and keep a record.  Bring it with you to your next appointment.   Don't drive for the first 3 weeks after returning home. Don't drive after that if you are still taking pain medicine.  Don't do strenuous activities for 12 weeks. Ask you provider when you can go back to daily activities, work, and having sex.  Plan frequent rest times to prevent shortness of breath.  Do deep breathing and controlled coughing exercises. Ask your provider for instructions.  Break the smoking habit:  Join a stop-smoking program to increase your chances of success.  Ask your provider about medicines or other methods to help you quit.  Ask family members to quit smoking as well.  Don't allow smoking in your home or around you.    Follow-Up  Report any problems to your provider right away.  Follow up with your healthcare provider, or as advised. Be sure to keep all follow-up appointments. Ask your provider about support groups for people who have had this procedure.      When to call your healthcare provider  Call your healthcare provider right away if you have any of the following:    Trouble breathing or chest pain (call 911)  Fever of 100.4°F (38.0°C) or higher, or as advised by your healthcare provider  Chills  More pain or swelling around the incision site  Black, tarry stools  Ongoing weight loss for no reason (a change of more than 10 pounds (4.5 kg) in 2 weeks)  Diarrhea that won’t go away   New, unexplained symptoms (treatment-related medicines may be causing side effects)  Signs of infection around the incision (redness, drainage, warmth, pain)  Shortness of breath without exertion  Trouble swallowing  Upset stomach  (nausea) or vomiting  Excessive weakness    © 7886-5605 [employer]. [address] [place] [address], [place] [address]. All rights reserved. This information is not intended as a substitute for professional medical care. Always follow your healthcare professional's instructions.      Discharge Instructions: Caring for Your Jejunostomy Tube (J-Tube)  You have been discharged with a feeding tube called a jejunostomy tube (J-tube or jejunal tube). The J-tube was put through your skin and into your small bowel (jejunum). This allows for feeding directly into your small bowel. Your feeding tube was put in because you are not able to take in enough food or drink through your mouth to keep your normal body weight. You were shown how to care for your J-tube in the hospital. This sheet helps you remember the steps when you’re at home.      Guidelines for continuous tube feeding  Make arrangements for a special feeding pump to be delivered to your home.  Don't take medicines through your J-tube.  Keep tension off the tubing by taping it up onto your belly (abdomen).    Clean around the tube  Wash your hands thoroughly with mild soap and water before starting your feeding.  Clean the area around the tube with mild soap and water.  Pat the area dry using a clean washcloth.    Get ready  Gather the supplies you will need. These include formula, water, feeding bag, pump, and a 30 mL to 60 mL syringe.  Close the clamp on the feeding bag tubing.  Slowly pour the formula into the feeding bag. Use the prescribed amount of feeding.  Hang the feeding bag on the pole about 1 to 2 feet above your head.    Begin feeding  Open the clamp and let the formula fill the entire tubing, clearing any air.  Close the clamp.  Connect the feeding bag tubing to the pump. Adjust the settings on the pump.  Using the syringe, flush the J-tube with the prescribed amount of water.  Connect the tubing of the feeding bag to the J-tube.  Open the clamp.  Start the pump.    After the feeding  When the feeding is finished, stop the infusion and flush the J-tube with the prescribed amount of water.  Stop the feeding once each day to clean the bag. Wash the feeding bag with soapy water.  Rinse the bag thoroughly so that there is no soapy film in the bag. Hang the bag to dry.  Flush the J-tube with the prescribed amount of water every 4 to 6 hours through the flush port. If there is no flush port, then stop the pump, disconnect the feeding bag tubing, and flush the J-tube.    Follow-up  Follow up with your healthcare provider, or as advised.      When to call your healthcare provider  Call your healthcare provider right away if you have any of the following:    A tube that is clogged or dislodged  Belly pain that gets worse  Vomiting  Fever of  100.4° F ( 38° C) or higher, or as advised by your provider  Chills  Diarrhea that lasts more than  2 days  Signs of infection (redness, swelling, or warmth at the tube site)  Drainage from the tube site  Weight loss of  2 or more pounds (0.9 kg or more) in  24 hours  Decreased urination    © 0325-5309 [employer]. [address] [place] [address], [place] [address]. All rights reserved. This information is not intended as a substitute for professional medical care. Always follow your healthcare professional's instructions.

## 2020-06-26 NOTE — PROGRESS NOTES
Pt laying in bed, call light within reach, bed lowered and locked, fall education reinforced. Pt is A&O4 and on 2L of oxygen via nasal cannula. Pt has a home BIPAP at the bedside. Pt lung sounds are diminished in the lower lobes, bowel sounds are normoactive in all four quadrants, heart sounds are within defined limits. PT IV is clean,dry,intact,and patent and infusing the appropriate fluids. Pt has a right sided chest tube to low suction of -20 with drainage of moderate serosanguinous drainage and dressing of gauze and tape CDI. Pt has a LLQ J tube infusing impact peptide at 70 mL/hr which is goal with no complications. Pt has 5 abdominal lap sites with gauze and tegaderm CDI. Pt has right flank 3 incisions with gauze and tegaderm CDI. Pt is up stand-by with a FWW. Pt started on a clear liquid diet and tolerating.

## 2020-06-26 NOTE — PROGRESS NOTES
Surgical Progress Note    Author: ARLEEN Degroot Date & Time created: 2020   8:36 AM     Interval Events:  S/p  Minimally invasive esophagectomy with gastric pullthrough,   Lymphadenectomy - POD#3, doing well; tolerating clear without dysphagia or N/V; pain controlled; ambulatory; using IS    Review of Systems   Constitutional: Negative for chills and fever.   Respiratory: Positive for cough. Negative for shortness of breath.    Gastrointestinal: Positive for abdominal pain. Negative for nausea and vomiting.   Skin: Negative for itching and rash.     Hemodynamics:  Temp (24hrs), Av.9 °C (98.5 °F), Min:36.7 °C (98 °F), Max:37.2 °C (98.9 °F)  Temperature: 36.9 °C (98.5 °F)  Pulse  Av.2  Min: 67  Max: 117   Blood Pressure: 101/62     Respiratory:    Respiration: 16, Pulse Oximetry: 93 %     Work Of Breathing / Effort: Mild  RUL Breath Sounds: Clear, RML Breath Sounds: Clear, RLL Breath Sounds: Diminished, ESEQUIEL Breath Sounds: Clear, LLL Breath Sounds: Diminished  Neuro:  GCS       Fluids:    Intake/Output Summary (Last 24 hours) at 2020 0836  Last data filed at 2020 0400  Gross per 24 hour   Intake 3620 ml   Output 2300 ml   Net 1320 ml        Current Diet Order   Procedures   • Diet Order Clear Liquid (no carbonation)     Physical Exam  Vitals signs and nursing note reviewed.   Constitutional:       General: He is not in acute distress.  Pulmonary:      Effort: Pulmonary effort is normal. No respiratory distress.   Abdominal:      Palpations: Abdomen is soft.      Comments: Incisions c/d/i  J-tube in place   Skin:     General: Skin is warm and dry.   Neurological:      Mental Status: He is alert and oriented to person, place, and time.   Psychiatric:         Mood and Affect: Mood normal.       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   ADAT to full liquids. Continue full liquids until postop appt.  Pt has TF's already set-up at home.  Discharge home in PM today when alert, comfortable, ambulatory, and tolerating PO well  Pt counseled re: diet , activity, wound care, I.S., and home med's  May shower tomorrow over Tegaderms.  Remove Tegaderms on 6/29/20 .  No baths, hot tubs, soaks.  No lifting >15 lbs for 1-2 wks  No driving for 4-5 days   F/U with Dr. Ganser in 1-2 weeks      Quality Measures:  Quality-Core Measures   DVT prophylaxis pharmacological::  Enoxaparin (Lovenox)  DVT prophylaxis - mechanical:  SCDs  Ulcer Prophylaxis::  Yes      Discussed patient condition with RN, Patient and Dr. Ganser

## 2020-06-26 NOTE — PROGRESS NOTES
Assumed care of patient. Patient denying pain or nausea and is in good mood. J tube to LLQ running tube food at goal of 70 ml. Lap sites x5 to abdomen are all clean, dry and intact with gauze and tegaderm. Chest tube to right side to wall suction. No air leak noted. Dressing is clean, dry and intact. Lap sites x3 surrounding chest tube with gauze and tegaderm. No other needs at this time. Call light within reach.

## 2020-06-26 NOTE — PROGRESS NOTES
Discharge paperwork discussed and signed. All questions answered. Prescription and follow up info with patient. Pressure dressing applied to right chest; supplies given to daughter. J tube flushed and clamped. Patient verbalized worsening symptoms and when to return to ER or call MD. Patient escorted out with staff.

## 2020-07-06 NOTE — DOCUMENTATION QUERY
"                                                                         Formerly [facility]                                                                       Query Response Note      PATIENT:               NEIDA SHARMA  ACCT #:                  [id number]  MRN:                     [id number]  :                      1955  ADMIT DATE:       2020 7:02 AM  DISCH DATE:        2020 4:32 PM  RESPONDING  PROVIDER #:        [id number]           QUERY TEXT:    The finding of two out of seventeen lymph nodes positive for metastatic carcinoma is documented in the pathology report.  Per coding guidelines, coders cannot code diagnosis from the pathology report without the Attending Physician's documentation of the diagnosis. Based on clinical findings, risk factors and treatment, can this diagnosis be further specified?    NOTE:  If an appropriate response is not listed below, please respond with a new note.        The patient's Clinical Indicators include:  65 year old admitted with esophageal CA.Underwent neoadjuvant chemotherapy and radiation.Has a feeding tube due to dysphagia.  Admitted for esophagectomy with gastric pull through and node dissection.  Path report :\" Two out of seventeen lymph nodes positive for metastatic carcinoma with focal areas of extranodal extension.  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

## 2020-07-07 DIAGNOSIS — J44.9 CHRONIC OBSTRUCTIVE PULMONARY DISEASE, UNSPECIFIED COPD TYPE (HCC): Chronic | ICD-10-CM

## 2020-07-07 RX ORDER — ALBUTEROL SULFATE 90 UG/1
2 POWDER, METERED RESPIRATORY (INHALATION) PRN
Qty: 1 EACH | Refills: 3 | Status: SHIPPED | OUTPATIENT
Start: 2020-07-07

## 2020-07-07 NOTE — TELEPHONE ENCOUNTER
Have we ever prescribed this med? No.  If yes, what date? N/A    Last OV: 09/05/2019- CAROLYNN Martin     Next OV: 09/08/2020- CAROLYNN Martin     DX:     Medications:   Requested Prescriptions     Pending Prescriptions Disp Refills   • Albuterol Sulfate (PROAIR RESPICLICK) 108 (90 Base) MCG/ACT AEROSOL POWDER, BREATH ACTIVATED 1 Each 3     Sig: Inhale 2 Puffs by mouth as needed (for shortness of breath, wheezing.). every 4-6 hours as needed.

## 2020-07-07 NOTE — DISCHARGE SUMMARY
ADMITTING DIAGNOSIS:  Distal esophageal adenocarcinoma.    DISCHARGE DIAGNOSIS:  Distal esophageal adenocarcinoma.    OPERATION PERFORMED:  Minimally invasive esophagectomy with gastric   pull-through, lymphadenectomy, performed by Dr. Ganser on 06/22/2020.    INDICATIONS:  The patient is a 65-year-old male who had developed dysphagia   and was found to have a distal esophageal cancer T3, N0.  He underwent   neoadjuvant chemotherapy and radiation.  He required a feeding tube prior to   treatment due to dysphagia.  He is eating better at this point and followup   scans showed no evidence of metastatic disease.  After an involved   preoperative education and informed consent process, the patient was brought   to the operating room for the aforementioned procedure.    HOSPITAL COURSE:  After the procedure, the patient went to the general   surgical unit in stable condition.  On postoperative day 2, patient underwent   an upper GI.  This showed no extravasation of dye and the patient was allowed   to begin a diet consisting of ice chips and small sips of water.  Over the   next 2 days, we were able to advance his diet to full liquids without   difficulty.  The chest tube was removed also without any difficulty.  At the   time of discharge, on postoperative day 4, patient's vital signs are stable   and he is afebrile.  He is ambulatory.  His pain is controlled.  His abdomen   is soft.  Wounds on his abdomen and his right thorax are all clear.    Additionally, he is tolerating tube feeds without any difficulty.    DISPOSITION:  Patient will be discharged home.    DISCHARGE INSTRUCTIONS:  Patient is instructed to follow up with Dr. Ganser in   the next 1-2 weeks.  He is counseled extensively regarding diet, activity,   wound care, and home medications.  He is to continue supplemental tube feeds   at home while he follows a full liquid diet until his postop appointment.    DISCHARGE MEDICATION:  Hydrocodone solution 10-20  mL p.o. or per J-tube q. 6   hours p.r.n. pain, dispensed quantity 200 mL.       ____________________________________     ANTONELLA COLLIER / JUANCARLOS    DD:  07/06/2020 07:24:30  DT:  07/07/2020 00:21:19    D#:  [id number]  Job#:  [id number]    cc: Abelardo Arias MD, VELMA GOODRICH MD, Gavino Cardenas MD, Jerry Bates MD

## 2020-07-20 RX ORDER — TIOTROPIUM BROMIDE 18 UG/1
18 CAPSULE ORAL; RESPIRATORY (INHALATION) DAILY
Qty: 90 CAP | Refills: 1 | Status: SHIPPED | OUTPATIENT
Start: 2020-07-20 | End: 2020-12-04

## 2020-07-20 NOTE — TELEPHONE ENCOUNTER
Have we ever prescribed this med? Yes.  If yes, what date? 03/27/20    Last OV: 09/5/2019 with Juliet Martin PA-C    Next OV: 09/08/2020 with Juliet Martin PA-C    DX: Chronic obstructive pulmonary disease, unspecified COPD type (HCC) (J44.9)     Medications:   Requested Prescriptions     Pending Prescriptions Disp Refills   • tiotropium (SPIRIVA) 18 MCG Cap 90 Cap 1     Sig: Inhale 1 Cap by mouth every day.     \

## 2020-07-30 DIAGNOSIS — F51.04 CHRONIC INSOMNIA: ICD-10-CM

## 2020-07-31 RX ORDER — TEMAZEPAM 22.5 MG/1
CAPSULE ORAL
Qty: 30 CAP | Refills: 2 | Status: SHIPPED | OUTPATIENT
Start: 2020-07-31 | End: 2020-10-23

## 2020-07-31 NOTE — TELEPHONE ENCOUNTER
Have we ever prescribed this med? Yes.  If yes, what date? 4/28/2020    Last OV: 9/5/19 CAROLYNN Martin P.A.-C.    Next OV: 9/8/2020 CAROLYNN Martin P.A.-C.    DX: Insomnia, unspecified type (G47.00) Chronic insomnia (F51.04)     Medications: temazepam (RESTORIL) 22.5 MG capsule

## 2020-07-31 NOTE — TELEPHONE ENCOUNTER
Pt notified that rx was sent to       [id number] - [place], [place] - [address] AT [facility] [address]  [address] [id number]  Phone: [phone] Fax: [phone]

## 2020-08-10 ENCOUNTER — DOCUMENTATION (OUTPATIENT)
Dept: NUTRITION | Facility: MEDICAL CENTER | Age: 65
End: 2020-08-10

## 2020-08-10 ENCOUNTER — HOSPITAL ENCOUNTER (OUTPATIENT)
Dept: RADIATION ONCOLOGY | Facility: MEDICAL CENTER | Age: 65
End: 2020-08-31
Attending: RADIOLOGY
Payer: COMMERCIAL

## 2020-08-10 VITALS
OXYGEN SATURATION: 92 % | TEMPERATURE: 98.2 F | SYSTOLIC BLOOD PRESSURE: 130 MMHG | HEART RATE: 110 BPM | RESPIRATION RATE: 20 BRPM | WEIGHT: 212.96 LBS | DIASTOLIC BLOOD PRESSURE: 87 MMHG | BODY MASS INDEX: 27.33 KG/M2

## 2020-08-10 DIAGNOSIS — C15.5 MALIGNANT NEOPLASM OF LOWER THIRD OF ESOPHAGUS (HCC): ICD-10-CM

## 2020-08-10 PROCEDURE — 99212 OFFICE O/P EST SF 10 MIN: CPT | Performed by: RADIOLOGY

## 2020-08-10 PROCEDURE — 99214 OFFICE O/P EST MOD 30 MIN: CPT | Performed by: RADIOLOGY

## 2020-08-10 RX ORDER — ATORVASTATIN CALCIUM 80 MG/1
80 TABLET, FILM COATED ORAL NIGHTLY
COMMUNITY

## 2020-08-10 ASSESSMENT — PAIN SCALES - GENERAL: PAINLEVEL: NO PAIN

## 2020-08-10 ASSESSMENT — FIBROSIS 4 INDEX: FIB4 SCORE: 2.13

## 2020-08-10 NOTE — NON-PROVIDER
Patient was seen today in clinic with Dr. Arias for follow up.  Vitals signs and weight were obtained and pain assessment was completed.  Allergies and medications were reviewed with the patient.  Review of systems completed.     Vitals/Pain:  Vitals:    08/10/20 1436   BP: 130/87   BP Location: Left arm   Patient Position: Sitting   BP Cuff Size: Adult   Pulse: (!) 110   Resp: 20   Temp: 36.8 °C (98.2 °F)   SpO2: 92%   Weight: 96.6 kg (212 lb 15.4 oz)   Pain Score: No pain        Allergies:   Morphine    Current Medications:  Current Outpatient Medications   Medication Sig Dispense Refill   • atorvastatin (LIPITOR) 80 MG tablet Take 80 mg by mouth every evening.     • temazepam (RESTORIL) 22.5 MG capsule TAKE 1 CAPSULE BY MOUTH EVERY DAY AT BEDTIME AS NEEDED FOR SLEEP 30 Cap 2   • tiotropium (SPIRIVA) 18 MCG Cap Inhale 1 Cap by mouth every day. 90 Cap 1   • Albuterol Sulfate (PROAIR RESPICLICK) 108 (90 Base) MCG/ACT AEROSOL POWDER, BREATH ACTIVATED Inhale 2 Puffs by mouth as needed (for shortness of breath, wheezing.). every 4-6 hours as needed. 1 Each 3   • acetaminophen (TYLENOL) 500 MG Tab Take 500-1,000 mg by mouth every 6 hours as needed.     • potassium chloride (MICRO-K) 10 MEQ capsule      • BIOTIN PO Take 1,000 mcg by mouth.     • ezetimibe (ZETIA) 10 MG Tab Take 10 mg by mouth every day.     • fluticasone-salmeterol (ADVAIR) 500-50 MCG/DOSE AEROSOL POWDER, BREATH ACTIVATED Inhale 1 Puff by mouth every 12 hours.     • BRILINTA 90 MG Tab tablet Take 90 mg by mouth 2 Times a Day. Indications: Acute Coronary Syndrome     • nitroglycerin (NITROSTAT) 0.4 MG SL Tab Place 1 Tab under tongue as needed for Chest Pain (Place 1 tablet under the tongue every 5 minutes up to 3 doses as needed for chest pain). 75 Tab 3   • omeprazole (PRILOSEC) 40 MG delayed-release capsule Take 40 mg by mouth every day.     • levothyroxine (SYNTHROID) 75 MCG Tab Take 75 mcg by mouth Every morning on an empty stomach.     • Melatonin 10  MG Tab Take 10 mg by mouth every bedtime.     • Cranberry 500 MG Cap Take 1 Cap by mouth every day.     • Zinc 50 MG Cap Take 50 mg by mouth every day.     • diphenhydrAMINE (BENADRYL) 25 MG Tab Take 25 mg by mouth every bedtime.     • aspirin EC (ECOTRIN) 81 MG TBEC Take 81 mg by mouth every day.     • tamsulosin (FLOMAX) 0.4 MG capsule Take 0.4 mg by mouth ONE-HALF HOUR AFTER BREAKFAST.     • benazepril (LOTENSIN) 20 MG Tab Take 1 Tab by mouth every day. 90 Tab 3     No current facility-administered medications for this encounter.          PCP:  Jossy Scales, Med Ass't

## 2020-08-10 NOTE — PROGRESS NOTES
RADIATION ONCOLOGY FOLLOW-UP    DATE OF SERVICE: 8/10/2020    IDENTIFICATION:   A 65 y.o. male with     Malignant neoplasm of lower third of esophagus (HCC)  Staging form: Esophagus - Adenocarcinoma, AJCC 8th Edition  - Clinical: Stage III (cT3, cN0, cM0, G3) - Signed by Abelardo Arias M.D. on 3/16/2020  Total positive nodes: 0  Histologic grading system: 3 grade system      RADIATION SUMMARY:  [facility] Treatment Information        Some values may be hidden. Unless noted otherwise, only the newest values recorded on each date are displayed.         [facility] Treatment Summary 5/7/20   Course First Treatment Date 03/30/2020   Course Last Treatment Date 05/06/2020   Esophagus Plan from Course C1_Esophagus   Fraction 25 of 25   Elapsed Course Days 37 @ [id number]   Prescribed Fraction Dose 180 cGy   Prescribed Total Dose 4,500 cGy   EsophagusBst Plan from Course C1_Esophagus   Fraction 3 of 3   Elapsed Course Days 37 @ [id number]   Prescribed Fraction Dose 180 cGy   Prescribed Total Dose 540 cGy   Boost cp Reference Point from Course C1_Esophagus   Elapsed Course Days 37 @ [id number]   Session Dose -   Total Dose 549 cGy   Esophagus cp Reference Point from Course C1_Esophagus   Elapsed Course Days 37 @ [id number]   Session Dose -   Total Dose 4,612 cGy   PTV45 Reference Point from Course C1_Esophagus   Elapsed Course Days 37 @ [id number]   Session Dose -   Total Dose 4,500 cGy   PTV50.4 Reference Point from Course C1_Esophagus   Elapsed Course Days 37 @ [id number]   Session Dose -   Total Dose 540 cGy             HISTORY OF PRESENT ILLNESS:   64-year-old male who originally presented with dysphasia around [place] time underwent first endoscopy by Dr. Hernandez on February 7, 2020 with pathology showing adenocarcinoma.  Patient had staging CT chest abdomen pelvis February 27, 2020 showed no evidence of metastatic disease but did show mild prominence to low wall of esophageal junction.  Patient had endoscopic  ultrasound with Dr. Cardenas on March 6, 2020 which showed clinical T3 N0 M0 adenocarcinoma in the distal esophageal junction.  Patient was seen by Dr. Bates who discussed chemoradiation therapy and ordered PET CT scan but due to insurance reasons has not been able to get it.  He currently has dysphasia to solids greater than liquids but has no major weight loss or bone pain.    INTERVAL HISTORY:  Patient completed chemoradiation therapy on May 6, 2020.  He underwent distal esophagectomy and proximal gastrectomy on June 22, 2020 with a 1.5 cm residual tumor grade 3 invading adventitia with negative margins and treatment effect with residual cancer evident overall partial response score 2.  Lymphovascular space invasion was noted and 2 of 19 lymph nodes were involved, [id number].  Patient is still struggling with weight loss and has his feeding tube still in place.  He is taking his antacid twice a day.      PROBLEM LIST:  Patient Active Problem List   Diagnosis   • Coronary artery disease due to calcified coronary lesion   • Essential hypertension, benign   • Dyslipidemia   • Cervical disc disease   • STEMI (ST elevation myocardial infarction) (HCC)   • COPD (chronic obstructive pulmonary disease) (HCC)   • Hypothyroid   • Thrombocytopenia (HCC)   • Systolic heart failure secondary to coronary artery disease (HCC)   • Diastolic heart failure (HCC)   • Cardiomyopathy, ischemic   • Hypoxia   • Hypoxemia   • Central sleep apnea   • Coronary artery disease involving native coronary artery of native heart without angina pectoris   • Central apnea   • BMI 37.0-37.9, adult   • Malignant neoplasm of lower third of esophagus (HCC)   • PONV (postoperative nausea and vomiting)       CURRENT MEDICATIONS:  Current Outpatient Medications   Medication Sig Dispense Refill   • atorvastatin (LIPITOR) 80 MG tablet Take 80 mg by mouth every evening.     • temazepam (RESTORIL) 22.5 MG capsule TAKE 1 CAPSULE BY MOUTH EVERY DAY AT BEDTIME  AS NEEDED FOR SLEEP 30 Cap 2   • tiotropium (SPIRIVA) 18 MCG Cap Inhale 1 Cap by mouth every day. 90 Cap 1   • Albuterol Sulfate (PROAIR RESPICLICK) 108 (90 Base) MCG/ACT AEROSOL POWDER, BREATH ACTIVATED Inhale 2 Puffs by mouth as needed (for shortness of breath, wheezing.). every 4-6 hours as needed. 1 Each 3   • acetaminophen (TYLENOL) 500 MG Tab Take 500-1,000 mg by mouth every 6 hours as needed.     • potassium chloride (MICRO-K) 10 MEQ capsule      • BIOTIN PO Take 1,000 mcg by mouth.     • ezetimibe (ZETIA) 10 MG Tab Take 10 mg by mouth every day.     • fluticasone-salmeterol (ADVAIR) 500-50 MCG/DOSE AEROSOL POWDER, BREATH ACTIVATED Inhale 1 Puff by mouth every 12 hours.     • BRILINTA 90 MG Tab tablet Take 90 mg by mouth 2 Times a Day. Indications: Acute Coronary Syndrome     • nitroglycerin (NITROSTAT) 0.4 MG SL Tab Place 1 Tab under tongue as needed for Chest Pain (Place 1 tablet under the tongue every 5 minutes up to 3 doses as needed for chest pain). 75 Tab 3   • omeprazole (PRILOSEC) 40 MG delayed-release capsule Take 40 mg by mouth every day.     • levothyroxine (SYNTHROID) 75 MCG Tab Take 75 mcg by mouth Every morning on an empty stomach.     • Melatonin 10 MG Tab Take 10 mg by mouth every bedtime.     • Cranberry 500 MG Cap Take 1 Cap by mouth every day.     • Zinc 50 MG Cap Take 50 mg by mouth every day.     • diphenhydrAMINE (BENADRYL) 25 MG Tab Take 25 mg by mouth every bedtime.     • aspirin EC (ECOTRIN) 81 MG TBEC Take 81 mg by mouth every day.     • tamsulosin (FLOMAX) 0.4 MG capsule Take 0.4 mg by mouth ONE-HALF HOUR AFTER BREAKFAST.     • benazepril (LOTENSIN) 20 MG Tab Take 1 Tab by mouth every day. 90 Tab 3     No current facility-administered medications for this encounter.        ALLERGIES:  Morphine    REVIEW OF SYSTEMS:  A review of systems for today's date of service was reviewed and uploaded into the electronic medical record.    PHYSICAL EXAM:  PERFORMANCE STATUS:  ECOG Performance  Review 4/1/2020 3/16/2020   ECOG Performance Status Fully active, able to carry on all pre-disease performance without restriction Fully active, able to carry on all pre-disease performance without restriction   Some recent data might be hidden     Karnofsky Score 8/10/2020 3/16/2020   Karnofsky Score 80 100   Some recent data might be hidden     /87 (BP Location: Left arm, Patient Position: Sitting, BP Cuff Size: Adult)   Pulse (!) 110   Temp 36.8 °C (98.2 °F)   Resp 20   Wt 96.6 kg (212 lb 15.4 oz)   SpO2 92%   BMI 27.33 kg/m²   Physical Exam  Vitals signs reviewed.   Constitutional:       Appearance: Normal appearance.   HENT:      Head: Normocephalic and atraumatic.      Mouth/Throat:      Mouth: Mucous membranes are moist.   Eyes:      Pupils: Pupils are equal, round, and reactive to light.   Neck:      Musculoskeletal: Normal range of motion.   Cardiovascular:      Rate and Rhythm: Normal rate.   Pulmonary:      Effort: Pulmonary effort is normal.   Musculoskeletal: Normal range of motion.   Neurological:      General: No focal deficit present.      Mental Status: He is alert.   Psychiatric:         Mood and Affect: Mood normal.         LABORATORY DATA:   Lab Results   Component Value Date/Time    WBC 7.9 06/26/2020 0449    WBC 9.1 06/25/2020 0459    WBC 9.6 06/24/2020 0444    HEMOGLOBIN 8.4 (L) 06/26/2020 0449    HEMOGLOBIN 7.8 (L) 06/25/2020 0459    HEMOGLOBIN 8.4 (L) 06/24/2020 0444    HEMATOCRIT 25.9 (L) 06/26/2020 0449    HEMATOCRIT 24.1 (L) 06/25/2020 0459    HEMATOCRIT 26.9 (L) 06/24/2020 0444    MCV 98.5 (H) 06/26/2020 0449    MCV 98.8 (H) 06/25/2020 0459    .1 (H) 06/24/2020 0444    PLATELETCT 163 (L) 06/26/2020 0449    PLATELETCT 161 (L) 06/25/2020 0459    PLATELETCT 171 06/24/2020 0444    NEUTS 4.86 04/16/2020 1357    NEUTS 4.59 03/06/2020 1210    NEUTS 5.21 07/23/2015 0940      Lab Results   Component Value Date/Time    SODIUM 133 (L) 06/25/2020 0459    SODIUM 136 06/23/2020 0667     SODIUM 139 06/17/2020 1503    POTASSIUM 3.8 06/25/2020 0459    POTASSIUM 4.3 06/23/2020 0635    POTASSIUM 4.5 06/17/2020 1503    BUN 17 06/25/2020 0459    BUN 14 06/23/2020 0635    BUN 20 06/17/2020 1503    CREATININE 0.44 (L) 06/25/2020 0459    CREATININE 0.59 06/23/2020 0635    CREATININE 0.77 06/17/2020 1503    CALCIUM 8.0 (L) 06/25/2020 0459    CALCIUM 8.0 (L) 06/23/2020 0635    CALCIUM 9.3 06/17/2020 1503    ALBUMIN 2.8 (L) 06/23/2020 0635    ALBUMIN 3.4 05/01/2020 1113    ALBUMIN 3.6 04/24/2020 1133    ASTSGOT 23 06/23/2020 0635    ASTSGOT 19 05/01/2020 1113    ASTSGOT 22 04/24/2020 1133    ALKPHOSPHAT 63 06/23/2020 0635    ALKPHOSPHAT 64 05/01/2020 1113    ALKPHOSPHAT 67 04/24/2020 1133    IFNOTAFR >60 06/25/2020 0459    IFNOTAFR >60 06/23/2020 0635    IFNOTAFR >60 06/17/2020 1503       IMPRESSION:    A 65 y.o. with   Malignant neoplasm of lower third of esophagus (HCC)  Staging form: Esophagus - Adenocarcinoma, AJCC 8th Edition  - Clinical: Stage III (cT3, cN0, cM0, G3) - Signed by Abelardo Arias M.D. on 3/16/2020  Total positive nodes: 0  Histologic grading system: 3 grade system    RECOMMENDATIONS:   I discussed with patient that he had a excellent surgery with negative margins although there were still 2 lymph nodes positive and some residual disease and that makes him slightly high risk of occurrence.  I explained NCCN guidelines and have ordered him a CT chest and pelvis which he wants to be done at [facility] in [place] in 3 months and I will see him afterwards.  He sees his pulmonologist for his shortness of breath regularly. He saw our nutritionist today as well.       Thank you for the opportunity to participate in his care.  If any questions or comments, please do not hesitate in calling.    Orders Placed This Encounter   • atorvastatin (LIPITOR) 80 MG tablet     CC: Dr. Bates, Dr. Ganser

## 2020-08-10 NOTE — PROGRESS NOTES
"Nutrition Services: Brief Update  Weight: 96.6 kg/ 212 lbs, weighed today during follow-up with MD  Weight History:  224 lbs (101.9 kg) on 6/22/20  210 lbs on 4/22/20  211 lbs on 4/15/20  221 lbs on 4/8/20  227 lbs on 4/1/20    Weight Change: wt appears to have decreased by ~ 12 lbs/ 5.3 kg within past 2 months, this is a 5.2% loss, which is not significant, though worth noting.     RD able to visit pt during MD follow-up. Daughter, Robert, is present at appointment. Pt states has had some complications post-esophagectomy, which includes pneumonia. States is feeling better now. Mentions weighed 206 lbs 2 weeks ago and is actually up in weight. States currently infusing TF at night only with bed propped up. States current rate is 70 mL/hr for the duration of the night and infuses 4 cartons of Pivot 1.5. States is trying to eat a bit during the day, though sometimes is still spitting things back up. Mentions surgeon discussed slow gastric time with pt and that this would improve. States will see Dr. Ganser again this Thursday. Mentions BMs are a \"hit and miss\" and sometimes won't go to the bathroom for 4 days, states urine color is much improved and still is trying to take sips of water throughout the day. States is eating things like tacos, biscuits, fried eggs, and hash browns. States these things have gone down well, though continues to have some issues with water. States juices are fine and iced tea goes down well. Asks if RD has more universal securement devices as [employer] states Insurance company will only provide 1 per month.     Plan/Recommend:  • Recommended continuation of nocturnal feedings at 70 mL/hr and trying to eat during the day. Recommended varying textures and having small meals/snacks every 2 hours instead of having 3 meals per day.   • RD discussed slow gastric transit time likely contributing to infrequent BMs and vomiting up food. Encouraged to continue discussion with Dr. Ganser in case " medication is warranted to improve gastric transit time.  • Encouraged continuation of flushing tube throughout the day. Discussed importance of hydration and to use tube as a source of hydration given difficulties with water intake. Pt verbalizes understanding.   • Provided universal securement devices.     RD to follow-up in a few weeks for check-in via phone call. Pt verbalizes appreciation.  Please contact [phone]

## 2020-08-25 ENCOUNTER — TELEPHONE (OUTPATIENT)
Dept: NUTRITION | Facility: MEDICAL CENTER | Age: 65
End: 2020-08-25

## 2020-08-25 NOTE — TELEPHONE ENCOUNTER
Nutrition Services: Telephone Encounter  RD called pt for check-in. Pt answers and states is doing better since RD last assessed. Mentions is doing better with small meals more frequently. Mentions did regurgitate food yesterday, though it is happening less often. States is taking a slow. [facility] Surgeon did not comment further on slow gastric time, just that it is likely to improve. Mentions has another follow-up with him so is not worried about it. Pt states may have gained more weight since his last appointment. Denies questions/concerns for RD. Verbalizes appreciation for the care.    RD to sign off at this time and remain available PRN.  [id number]

## 2020-09-08 ENCOUNTER — APPOINTMENT (OUTPATIENT)
Dept: PULMONOLOGY | Facility: HOSPICE | Age: 65
End: 2020-09-08
Attending: PHYSICIAN ASSISTANT
Payer: COMMERCIAL

## 2020-09-08 ENCOUNTER — OFFICE VISIT (OUTPATIENT)
Dept: PULMONOLOGY | Facility: HOSPICE | Age: 65
End: 2020-09-08
Payer: COMMERCIAL

## 2020-09-08 VITALS
HEIGHT: 74 IN | BODY MASS INDEX: 27.98 KG/M2 | DIASTOLIC BLOOD PRESSURE: 80 MMHG | RESPIRATION RATE: 16 BRPM | OXYGEN SATURATION: 93 % | HEART RATE: 70 BPM | SYSTOLIC BLOOD PRESSURE: 136 MMHG | WEIGHT: 218 LBS

## 2020-09-08 DIAGNOSIS — J44.9 CHRONIC OBSTRUCTIVE PULMONARY DISEASE, UNSPECIFIED COPD TYPE (HCC): ICD-10-CM

## 2020-09-08 DIAGNOSIS — G47.31 CENTRAL SLEEP APNEA: ICD-10-CM

## 2020-09-08 DIAGNOSIS — C15.9 MALIGNANT NEOPLASM OF ESOPHAGUS, UNSPECIFIED LOCATION (HCC): ICD-10-CM

## 2020-09-08 PROCEDURE — 99214 OFFICE O/P EST MOD 30 MIN: CPT | Performed by: PHYSICIAN ASSISTANT

## 2020-09-08 RX ORDER — METHYLPREDNISOLONE 4 MG/1
TABLET ORAL
Qty: 21 TAB | Refills: 0 | Status: SHIPPED | OUTPATIENT
Start: 2020-09-08

## 2020-09-08 RX ORDER — DOXAZOSIN MESYLATE 4 MG/1
TABLET ORAL
COMMUNITY
Start: 2020-08-27

## 2020-09-08 RX ORDER — LEVOFLOXACIN 750 MG/1
TABLET, FILM COATED ORAL
COMMUNITY
Start: 2020-07-08

## 2020-09-08 RX ORDER — FUROSEMIDE 20 MG/1
TABLET ORAL
COMMUNITY
Start: 2020-07-21

## 2020-09-08 RX ORDER — AZITHROMYCIN 250 MG/1
TABLET, FILM COATED ORAL
Qty: 6 TAB | Refills: 0 | Status: SHIPPED | OUTPATIENT
Start: 2020-09-08

## 2020-09-08 RX ORDER — ALBUTEROL SULFATE 2.5 MG/3ML
2.5 SOLUTION RESPIRATORY (INHALATION) EVERY 4 HOURS PRN
Qty: 120 ML | Refills: 11 | Status: SHIPPED | OUTPATIENT
Start: 2020-09-08 | End: 2023-10-19 | Stop reason: SDUPTHER

## 2020-09-08 RX ORDER — METOPROLOL SUCCINATE 25 MG/1
TABLET, EXTENDED RELEASE ORAL
COMMUNITY
Start: 2020-09-01

## 2020-09-08 ASSESSMENT — ENCOUNTER SYMPTOMS
TREMORS: 0
ORTHOPNEA: 1
WHEEZING: 0
SHORTNESS OF BREATH: 1
SINUS PAIN: 0
SPUTUM PRODUCTION: 1
INSOMNIA: 0
HEARTBURN: 0
COUGH: 1
SORE THROAT: 0
CHILLS: 0
HEADACHES: 0
FEVER: 0
WEIGHT LOSS: 1
DIZZINESS: 0
PALPITATIONS: 0

## 2020-09-08 ASSESSMENT — FIBROSIS 4 INDEX: FIB4 SCORE: 2.13
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
## 2020-09-08 NOTE — PATIENT INSTRUCTIONS
1-reviewed covid 19 guidelines including social distancing, wear mask in public, frequent hand washing, early flu vaccine administration  2-provided with rescue pack  3-indications for use: increased secretions, increased shortness of breathg, wheezing   4-begin nebulizer up to 4 hours apart whether inhaler or nebulizer   5-follow up in 3 months

## 2020-09-15 ENCOUNTER — TELEPHONE (OUTPATIENT)
Dept: PULMONOLOGY | Facility: HOSPICE | Age: 65
End: 2020-09-15

## 2020-09-15 NOTE — TELEPHONE ENCOUNTER
Patient called wanting clarification on the Emergency pack he was given from Juliet Martin, I advised that if he experiences chest congestion, SOB, fever or experiencing a little harder to breath then he should start taking them.    He appreciated the call back and no further questions at this time.

## 2020-10-01 ENCOUNTER — HOSPITAL ENCOUNTER (OUTPATIENT)
Dept: RADIOLOGY | Facility: MEDICAL CENTER | Age: 65
End: 2020-10-01
Payer: COMMERCIAL

## 2020-10-20 ENCOUNTER — TELEPHONE (OUTPATIENT)
Dept: PULMONOLOGY | Facility: HOSPICE | Age: 65
End: 2020-10-20

## 2020-10-20 DIAGNOSIS — J44.9 CHRONIC OBSTRUCTIVE PULMONARY DISEASE, UNSPECIFIED COPD TYPE (HCC): ICD-10-CM

## 2020-10-21 NOTE — TELEPHONE ENCOUNTER
Have we ever prescribed this med? Yes.  If yes, what date? 9/8/20    Last OV: 9/8/20    Next OV: 12/9/20    DX: COPD    Medications: advair 90 day mail order

## 2020-10-23 DIAGNOSIS — F51.04 CHRONIC INSOMNIA: ICD-10-CM

## 2020-10-23 RX ORDER — TEMAZEPAM 22.5 MG/1
22.5 CAPSULE ORAL NIGHTLY PRN
Qty: 37 CAP | Refills: 0 | Status: SHIPPED | OUTPATIENT
Start: 2020-10-23 | End: 2020-11-29

## 2020-10-23 NOTE — TELEPHONE ENCOUNTER
Patient asking for an early refill.  Going out of state for a week to [place] and will run out midway into trip.

## 2020-11-05 ENCOUNTER — HOSPITAL ENCOUNTER (OUTPATIENT)
Dept: RADIATION ONCOLOGY | Facility: MEDICAL CENTER | Age: 65
End: 2020-11-30
Attending: RADIOLOGY
Payer: COMMERCIAL

## 2020-11-05 VITALS — WEIGHT: 198 LBS | BODY MASS INDEX: 25.42 KG/M2

## 2020-11-05 DIAGNOSIS — C15.5 MALIGNANT NEOPLASM OF LOWER THIRD OF ESOPHAGUS (HCC): ICD-10-CM

## 2020-11-05 PROCEDURE — 99214 OFFICE O/P EST MOD 30 MIN: CPT | Mod: 95,CR | Performed by: RADIOLOGY

## 2020-11-05 ASSESSMENT — FIBROSIS 4 INDEX: FIB4 SCORE: 2.13

## 2020-11-05 ASSESSMENT — PAIN SCALES - GENERAL: PAINLEVEL: NO PAIN

## 2020-11-05 NOTE — NON-PROVIDER
Patient was seen today in clinic with Dr. Arias for follow up.  Vitals signs and weight were obtained and pain assessment was completed.  Allergies and medications were reviewed with the patient.  Review of systems completed.     Vitals/Pain:  Vitals:    11/05/20 1458   Weight: 89.8 kg (198 lb)   Pain Score: No pain        Allergies:   Morphine    Current Medications:  Current Outpatient Medications   Medication Sig Dispense Refill   • temazepam (RESTORIL) 22.5 MG capsule Take 1 Cap by mouth at bedtime as needed for Sleep for up to 37 days. 37 Cap 0   • fluticasone-salmeterol (ADVAIR) 500-50 MCG/DOSE AEROSOL POWDER, BREATH ACTIVATED Inhale 1 Puff by mouth 2 times a day. 3 Each 3   • doxazosin (CARDURA) 4 MG Tab      • furosemide (LASIX) 20 MG Tab      • HYDROcodone-acetaminophen 2.5-108 mg/5mL (HYCET) 7.5-325 MG/15ML solution      • levoFLOXacin (LEVAQUIN) 750 MG tablet      • metoprolol SR (TOPROL XL) 25 MG TABLET SR 24 HR      • albuterol (PROVENTIL) 2.5mg/3ml Nebu Soln solution for nebulization 3 mL by Nebulization route every four hours as needed for Shortness of Breath. 120 mL 11   • methylPREDNISolone (MEDROL DOSEPAK) 4 MG Tablet Therapy Pack Take as directed. 21 Tab 0   • azithromycin (ZITHROMAX) 250 MG Tab Take 2 tablets on day 1, then take 1 tablet a day for 4 days. 6 Tab 0   • atorvastatin (LIPITOR) 80 MG tablet Take 80 mg by mouth every evening.     • tiotropium (SPIRIVA) 18 MCG Cap Inhale 1 Cap by mouth every day. 90 Cap 1   • Albuterol Sulfate (PROAIR RESPICLICK) 108 (90 Base) MCG/ACT AEROSOL POWDER, BREATH ACTIVATED Inhale 2 Puffs by mouth as needed (for shortness of breath, wheezing.). every 4-6 hours as needed. 1 Each 3   • acetaminophen (TYLENOL) 500 MG Tab Take 500-1,000 mg by mouth every 6 hours as needed.     • potassium chloride (MICRO-K) 10 MEQ capsule      • BIOTIN PO Take 1,000 mcg by mouth.     • ezetimibe (ZETIA) 10 MG Tab Take 10 mg by mouth every day.     • BRILINTA 90 MG Tab tablet Take  90 mg by mouth 2 Times a Day. Indications: Acute Coronary Syndrome     • benazepril (LOTENSIN) 20 MG Tab Take 1 Tab by mouth every day. (Patient not taking: Reported on 9/8/2020) 90 Tab 3   • nitroglycerin (NITROSTAT) 0.4 MG SL Tab Place 1 Tab under tongue as needed for Chest Pain (Place 1 tablet under the tongue every 5 minutes up to 3 doses as needed for chest pain). 75 Tab 3   • omeprazole (PRILOSEC) 40 MG delayed-release capsule Take 40 mg by mouth every day.     • levothyroxine (SYNTHROID) 75 MCG Tab Take 75 mcg by mouth Every morning on an empty stomach.     • Melatonin 10 MG Tab Take 10 mg by mouth every bedtime.     • Cranberry 500 MG Cap Take 1 Cap by mouth every day.     • Zinc 50 MG Cap Take 50 mg by mouth every day.     • diphenhydrAMINE (BENADRYL) 25 MG Tab Take 25 mg by mouth every bedtime.     • aspirin EC (ECOTRIN) 81 MG TBEC Take 81 mg by mouth every day.     • tamsulosin (FLOMAX) 0.4 MG capsule Take 0.4 mg by mouth ONE-HALF HOUR AFTER BREAKFAST.       No current facility-administered medications for this encounter.          PCP:  Jossy Scales, Med Ass't

## 2020-11-05 NOTE — PROGRESS NOTES
Virtual Visit: Established Patient   This visit was conducted via Zoom using secure and encrypted videoconferencing technology. The patient was in a private location in the [facility].    The patient's identity was confirmed and verbal consent was obtained for this virtual visit.    Subjective:   CC:   Chief Complaint   Patient presents with   • Cancer     follow up       Norberto White is a 65 y.o. male presenting for evaluation and management of Stage 3 esophageal cancer s/p preoperative chemoradiation followed by surgery with [id number] negative margins 6/22/20    HISTORY OF PRESENT ILLNESS:  64-year-old male who originally presented with dysphasia around Jessie time underwent first endoscopy by Dr. Hernandez on February 7, 2020 with pathology showing adenocarcinoma.  Patient had staging CT chest abdomen pelvis February 27, 2020 showed no evidence of metastatic disease but did show mild prominence to low wall of esophageal junction.  Patient had endoscopic ultrasound with Dr. Cardenas on March 6, 2020 which showed clinical T3 N0 M0 adenocarcinoma in the distal esophageal junction.  Patient was seen by Dr. Bates who discussed chemoradiation therapy and ordered PET CT scan but due to insurance reasons has not been able to get it.  He currently has dysphasia to solids greater than liquids but has no major weight loss or bone pain.    8/10/200  Patient completed chemoradiation therapy on May 6, 2020.  He underwent distal esophagectomy and proximal gastrectomy on June 22, 2020 with a 1.5 cm residual tumor grade 3 invading adventitia with negative margins and treatment effect with residual cancer evident overall partial response score 2.  Lymphovascular space invasion was noted and 2 of 19 lymph nodes were involved, [id number].  Patient is still struggling with weight loss and has his feeding tube still in place.  He is taking his antacid twice a day.    INTERVAL HISTORY:  Patient doing well he did have  one episode while he is on vacation and organ of hypoglycemia for which she went to the emergency department and they did a CAT scan which we do not have the report at this time.  He had a previous CT chest abdomen pelvis September 24, 2020 done at [facility] which I reviewed with him which did show some resolving pleural effusions as well as some small mediastinal lymph nodes that were nonspecific at this time.  Overall he is feeling well he is able to keep his weight up and he denies any fatigue or other changes in GI symptoms he does have some new visual changes due to his cataracts.      ROS   Denies any recent fevers or chills. No nausea or vomiting. No chest pains or shortness of breath. +vision changes due to catarcts    Allergies   Allergen Reactions   • Morphine Anxiety and Unspecified     Pt becomes irritable  Agitation; hot flashes.       Current medicines (including changes today)  Current Outpatient Medications   Medication Sig Dispense Refill   • temazepam (RESTORIL) 22.5 MG capsule Take 1 Cap by mouth at bedtime as needed for Sleep for up to 37 days. 37 Cap 0   • fluticasone-salmeterol (ADVAIR) 500-50 MCG/DOSE AEROSOL POWDER, BREATH ACTIVATED Inhale 1 Puff by mouth 2 times a day. 3 Each 3   • doxazosin (CARDURA) 4 MG Tab      • furosemide (LASIX) 20 MG Tab      • HYDROcodone-acetaminophen 2.5-108 mg/5mL (HYCET) 7.5-325 MG/15ML solution      • levoFLOXacin (LEVAQUIN) 750 MG tablet      • metoprolol SR (TOPROL XL) 25 MG TABLET SR 24 HR      • albuterol (PROVENTIL) 2.5mg/3ml Nebu Soln solution for nebulization 3 mL by Nebulization route every four hours as needed for Shortness of Breath. 120 mL 11   • methylPREDNISolone (MEDROL DOSEPAK) 4 MG Tablet Therapy Pack Take as directed. 21 Tab 0   • azithromycin (ZITHROMAX) 250 MG Tab Take 2 tablets on day 1, then take 1 tablet a day for 4 days. 6 Tab 0   • atorvastatin (LIPITOR) 80 MG tablet Take 80 mg by mouth every evening.     • tiotropium (SPIRIVA)  18 MCG Cap Inhale 1 Cap by mouth every day. 90 Cap 1   • Albuterol Sulfate (PROAIR RESPICLICK) 108 (90 Base) MCG/ACT AEROSOL POWDER, BREATH ACTIVATED Inhale 2 Puffs by mouth as needed (for shortness of breath, wheezing.). every 4-6 hours as needed. 1 Each 3   • acetaminophen (TYLENOL) 500 MG Tab Take 500-1,000 mg by mouth every 6 hours as needed.     • potassium chloride (MICRO-K) 10 MEQ capsule      • BIOTIN PO Take 1,000 mcg by mouth.     • ezetimibe (ZETIA) 10 MG Tab Take 10 mg by mouth every day.     • BRILINTA 90 MG Tab tablet Take 90 mg by mouth 2 Times a Day. Indications: Acute Coronary Syndrome     • benazepril (LOTENSIN) 20 MG Tab Take 1 Tab by mouth every day. (Patient not taking: Reported on 9/8/2020) 90 Tab 3   • nitroglycerin (NITROSTAT) 0.4 MG SL Tab Place 1 Tab under tongue as needed for Chest Pain (Place 1 tablet under the tongue every 5 minutes up to 3 doses as needed for chest pain). 75 Tab 3   • omeprazole (PRILOSEC) 40 MG delayed-release capsule Take 40 mg by mouth every day.     • levothyroxine (SYNTHROID) 75 MCG Tab Take 75 mcg by mouth Every morning on an empty stomach.     • Melatonin 10 MG Tab Take 10 mg by mouth every bedtime.     • Cranberry 500 MG Cap Take 1 Cap by mouth every day.     • Zinc 50 MG Cap Take 50 mg by mouth every day.     • diphenhydrAMINE (BENADRYL) 25 MG Tab Take 25 mg by mouth every bedtime.     • aspirin EC (ECOTRIN) 81 MG TBEC Take 81 mg by mouth every day.     • tamsulosin (FLOMAX) 0.4 MG capsule Take 0.4 mg by mouth ONE-HALF HOUR AFTER BREAKFAST.       No current facility-administered medications for this encounter.        Patient Active Problem List    Diagnosis Date Noted   • STEMI (ST elevation myocardial infarction) ([facility]) 07/23/2015     Priority: High   • Cardiomyopathy, ischemic 07/25/2015     Priority: Medium   • PONV (postoperative nausea and vomiting) 04/14/2020   • Malignant neoplasm of lower third of esophagus ([facility]) 03/16/2020   • Coronary artery disease  involving native coronary artery of native heart without angina pectoris 07/09/2018   • Central apnea 07/09/2018   • BMI 37.0-37.9, adult 07/09/2018   • Central sleep apnea 06/28/2017   • Hypoxemia 12/14/2016   • Hypoxia 10/23/2015   • COPD (chronic obstructive pulmonary disease) ([facility]) 07/24/2015   • Hypothyroid 07/24/2015   • Thrombocytopenia ([facility]) 07/24/2015   • Systolic heart failure secondary to coronary artery disease ([facility]) 07/24/2015   • Diastolic heart failure ([facility]) 07/24/2015   • Cervical disc disease 10/13/2014   • Coronary artery disease due to calcified coronary lesion 02/13/2014   • Essential hypertension, benign 02/13/2014   • Dyslipidemia 02/13/2014       Family History   Problem Relation Age of Onset   • Heart Attack Father    • Cancer Mother        He  has a past medical history of Acute MI, inferolateral wall, initial episode of care ([facility]), Anesthesia, Arthritis (03/05/2020), ASTHMA, Breath shortness, Bronchitis (2014), CAD (coronary artery disease) (2/13/2014), Cancer ([facility]) (03/05/2020), Chronic airway obstruction, not elsewhere classified, Congestive heart failure ([facility]), EMPHYSEMA, Essential hypertension, benign (2/13/2014), High cholesterol, History of chronic cough, HLD (hyperlipidemia) (2/13/2014), Hypertension (2013), ADRYAN on CPAP (6/28/2017), PAF (paroxysmal atrial fibrillation) ([facility]) (7/26/2015), Pneumonia, PONV (postoperative nausea and vomiting), Psychiatric problem, Unspecified disorder of thyroid (2010), and Unspecified hemorrhagic conditions.  He  has a past surgical history that includes cervical disk and fusion anterior (10/13/2014); multiple coronary artery bypass endo vein harvest (N/A, 7/23/2015); colonoscopy (2009); dental surgery (1971); hernia repair (1956); tonsillectomy (1958); pr endoscopic us exam, esoph (3/6/2020); gastroscopy-endo (3/6/2020); egd with asp/bx (3/6/2020); pr lap,gastrostomy,w/o tube constr (N/A, 4/14/2020); other cardiac surgery (2008); other orthopedic  surgery (2012); and esophagectomy thorascopic (N/A, 6/22/2020).       Objective:   Wt 89.8 kg (198 lb)   BMI 25.42 kg/m²     Physical Exam:  Constitutional: Alert, no distress, well-groomed.  Skin: No rashes in visible areas.  Eye: Round. Conjunctiva clear, lids normal. No icterus.   ENMT: Lips pink without lesions, good dentition, moist mucous membranes. Phonation normal.  Neck: No masses, no thyromegaly. Moves freely without pain.  Respiratory: Unlabored respiratory effort, no cough or audible wheeze  Psych: Alert and oriented x3, normal affect and mood.       Assessment and Plan:   65 y.o. male presenting for evaluation and management of Stage 3 esophageal cancer s/p preoperative chemoradiation followed by surgery with [id number] negative margins 6/22/20    I reviewed his most recent CT scan which does show some small mediastinal lymph nodes which were nonspecific I did recommend repeating this 1 more time in March to make sure these are not growing.  Also have requested the CT scan that he had done in [place] at the [facility] to compare as well.  Overall he is asymptomatic this time denies any dysphagia or signs of stricture.  I have also referred him to a GI doctor for routine colonoscopy as its been sometime additionally his previous CAT scan did show some area of change near the transverse colon although it did seem like it was outside of the colon from the report.  Patient will follow back with repeat CT chest abdomen pelvis in March 2021 with me.    CC: Dr. Bates, Dr. Ganser      Follow-up: No follow-ups on file.

## 2020-12-01 ENCOUNTER — TELEPHONE (OUTPATIENT)
Dept: SLEEP MEDICINE | Facility: MEDICAL CENTER | Age: 65
End: 2020-12-01

## 2020-12-01 DIAGNOSIS — G47.00 INSOMNIA, UNSPECIFIED TYPE: ICD-10-CM

## 2020-12-01 RX ORDER — TEMAZEPAM 22.5 MG/1
22.5 CAPSULE ORAL NIGHTLY PRN
Qty: 23 CAP | Refills: 0 | Status: SHIPPED | OUTPATIENT
Start: 2020-12-01 | End: 2020-12-09 | Stop reason: SDUPTHER

## 2020-12-01 NOTE — TELEPHONE ENCOUNTER
I called [facility] [address] I spoke to Nakul Tripathi, and Nancy. Per Nancy the pharmacist states pt picked up 30 tab 10/24/2020 and the rest 7 tab picked up 11/29/2020. Per Pharmacist we either send updated prescription for 30 tabs or 23 tabs for the remainder of the month. Please advise

## 2020-12-01 NOTE — TELEPHONE ENCOUNTER
Patient called [facility] daughter had gone to [facility] pharmacy on Visit in [place] to  prescription temazepam (RESTORIL) 22.5 MG capsule that was placed on 10/23/2020. Pt explained he lives in [place] and per regulations for medications he needs to fill prescription in [place]. Per patient daughter picked medication Sunday 11/29/2020 had was only 7 tabs. Prescription was placed for 37 tabs zero refills. Pt [facility] will be in town tomorrow 12/2/2020 for a eye appt.

## 2020-12-02 NOTE — TELEPHONE ENCOUNTER
I spoke to the patient explained prescription from 10/23/2020 written as 37 tabs. 30 tabs was picked up on 10/24/2020 pharmacy did not supply the 37 tab as prescribed and then provided the patient with the rest of the 7 tab prescription on 11/29/2020. Informed the patient a prescription for 23 tabs was sent to add to his 7 tabs to equal a 30 day prescription. Highly encouraged the patient to keep visit as scheduled 12/9/2020 and will also need to be seen and scheduled with sleep. Informed the patient will be managed at the time of his appt.

## 2020-12-02 NOTE — TELEPHONE ENCOUNTER
Case reviewed including PDMP, will need follow up with sleep provider.  Has appointment 12/9/20.  Patient will need to keep that appointment.

## 2020-12-03 DIAGNOSIS — C15.9 MALIGNANT NEOPLASM OF ESOPHAGUS, UNSPECIFIED LOCATION (HCC): ICD-10-CM

## 2020-12-03 DIAGNOSIS — J44.9 CHRONIC OBSTRUCTIVE PULMONARY DISEASE, UNSPECIFIED COPD TYPE (HCC): ICD-10-CM

## 2020-12-04 RX ORDER — TIOTROPIUM BROMIDE 18 UG/1
CAPSULE ORAL; RESPIRATORY (INHALATION)
Qty: 90 CAP | Refills: 3 | Status: SHIPPED | OUTPATIENT
Start: 2020-12-04 | End: 2021-10-28

## 2020-12-04 NOTE — TELEPHONE ENCOUNTER
Have we ever prescribed this med? Yes.  If yes, what date? 07/20/2020    Last OV: 09/08/2020 - ELVIRA JAMISON    Next OV: 12/09/2020 - ELVIRA JAMISON    DX: COPD    Medications: Spiriva

## 2020-12-09 ENCOUNTER — OFFICE VISIT (OUTPATIENT)
Dept: SLEEP MEDICINE | Facility: MEDICAL CENTER | Age: 65
End: 2020-12-09
Payer: COMMERCIAL

## 2020-12-09 ENCOUNTER — HOSPITAL ENCOUNTER (OUTPATIENT)
Dept: RADIOLOGY | Facility: MEDICAL CENTER | Age: 65
End: 2020-12-09
Payer: COMMERCIAL

## 2020-12-09 VITALS
RESPIRATION RATE: 16 BRPM | DIASTOLIC BLOOD PRESSURE: 76 MMHG | OXYGEN SATURATION: 95 % | SYSTOLIC BLOOD PRESSURE: 120 MMHG | BODY MASS INDEX: 28.11 KG/M2 | HEIGHT: 74 IN | HEART RATE: 65 BPM | WEIGHT: 219 LBS

## 2020-12-09 DIAGNOSIS — C15.5 MALIGNANT NEOPLASM OF LOWER THIRD OF ESOPHAGUS (HCC): ICD-10-CM

## 2020-12-09 DIAGNOSIS — G47.00 INSOMNIA, UNSPECIFIED TYPE: ICD-10-CM

## 2020-12-09 DIAGNOSIS — Z87.891 FORMER SMOKER: ICD-10-CM

## 2020-12-09 DIAGNOSIS — J44.9 CHRONIC OBSTRUCTIVE PULMONARY DISEASE, UNSPECIFIED COPD TYPE (HCC): ICD-10-CM

## 2020-12-09 DIAGNOSIS — G47.31 CENTRAL SLEEP APNEA: ICD-10-CM

## 2020-12-09 PROCEDURE — 99214 OFFICE O/P EST MOD 30 MIN: CPT | Performed by: PHYSICIAN ASSISTANT

## 2020-12-09 RX ORDER — TEMAZEPAM 22.5 MG/1
22.5 CAPSULE ORAL NIGHTLY PRN
Qty: 30 CAP | Refills: 1 | Status: SHIPPED | OUTPATIENT
Start: 2020-12-22 | End: 2021-02-20

## 2020-12-09 ASSESSMENT — ENCOUNTER SYMPTOMS
HEADACHES: 0
TREMORS: 0
COUGH: 1
WHEEZING: 0
SORE THROAT: 0
HEARTBURN: 0
CHILLS: 0
SHORTNESS OF BREATH: 1
SPUTUM PRODUCTION: 1
DIZZINESS: 0
SINUS PAIN: 0
WEIGHT LOSS: 0
FEVER: 0
ORTHOPNEA: 1
PALPITATIONS: 0
INSOMNIA: 1

## 2020-12-09 ASSESSMENT — FIBROSIS 4 INDEX: FIB4 SCORE: 2.13

## 2020-12-09 NOTE — PATIENT INSTRUCTIONS
1-patient with difficulties getting dose of temazepam in [place]  2-have follow up with sleep doc to discuss options  3-reviewed CT scan, follow up CT pending next week  4-follow up in 4 months, seeing Dr. Arias for continued monitoring  5-managing on current meds

## 2020-12-09 NOTE — PROGRESS NOTES
CC: Frustrating getting refills on temazepam    HPI:  Norberto White is a 65 y.o. year old male here today for follow-up on COPD, central sleep apnea and insomnia.  Last seen in clinic 9/8/2020 and had requested annual follow-up which was not realistic for management of his current health issues.  He is a former smoker with reported 40-pack-year history and quit date in 1999.  He has his daughter in the car on speaker phone to help him remember discussion points.    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  History also includes multiple MIs including STEMI with 2 stents placed, diastolic heart failure, cardiomyopathy, paroxysmal atrial fibrillation, thoracentesis 2015.      Reviewed in clinic vitals including blood pressure 120/76, heart rate of 65, O2 sat of 95% and BMI of 28.12 kg/m².    Reviewed home medication regimen including albuterol nebulizer, albuterol inhaler, omeprazole, Spiriva, Advair, Lasix and temazepam.  Patient reports unable to obtain same dosing in [place].  Some issues with refills in [place] see prior notes.  Again recommended sleep provider follow-up for discussion of management including possible alternatives.    Reviewed most recent imaging including CT [facility]/24/2020 demonstrating surgical intervention on the esophagus and stomach, esophagus is dilated with air and fluid, small right sided pleural effusion, atelectatic changes involving right lung, minimal atelectasis left lung base and small left pleural effusion, few small mediastinal lymph nodes measuring up to 5 mm in diameter.  There is a 3.4 x 2.3 cm lesion posterior to the transverse colon in the previous location of the stomach which may represent a remnant left behind or postoperative seroma, old hematoma or less likely an abscess.  Marked diverticulosis  in the colon.    Chest x-ray obtained 7/24/2020 demonstrated persistent but improved infiltrate of the right lower lung, small pleural effusions right greater than left.      Chest x-ray obtained 7/08/2020 demonstrated new bilateral small pleural effusions right greater than left, new infiltrate right middle and right lower lobe worrisome for pneumonia.     CT scan of the chest with contrast obtained 6/2/2020 demonstrated mild thickening to the wall of the esophagus in the region of the gastroesophageal junction, some scarring and atelectasis at the lung bases which is a new finding when compared to 2/27/2020.    Pulmonary function testing obtained 6/26/2019 demonstrated FEV1 of 2.85 L or 74% predicted, FVC of 4.25 L or 84% predicted, FEV1/FVC ratio 67, no significant bronchodilator response, flow volume loop consistent with obstruction.  Residual volume 168% predicted, total lung capacity 114% predicted, DLCO 134% predicted.  Per pulmonologist interpretation moderate obstructive ventilatory defect consistent with listed diagnosis of COPD.    Review of Systems   Constitutional: Positive for malaise/fatigue. Negative for chills, fever and weight loss.   HENT: Positive for congestion (allergies). Negative for hearing loss, nosebleeds, sinus pain, sore throat and tinnitus.    Eyes:        Presc eyeglasses   Respiratory: Positive for cough, sputum production (clear) and shortness of breath. Negative for wheezing.    Cardiovascular: Positive for orthopnea (due to surgery) and leg swelling (if sits too long). Negative for chest pain and palpitations.   Gastrointestinal: Negative for heartburn.        No dentures, no difficulties swallow    Neurological: Negative for dizziness, tremors and headaches.   Psychiatric/Behavioral: The patient has insomnia.        Past Medical History:   Diagnosis Date   • Acute MI, inferolateral wall, initial episode of care (HCC)     X5 since 2007 last one 8/2019, Dr. Andrade at [facility]   •  Anesthesia     PONV   • Arthritis 03/05/2020    Joints   • ASTHMA     inhaler daily   • Breath shortness    • Bronchitis 2014   • CAD (coronary artery disease) 2/13/2014    Bare-metal stent to the circumflex in December 2013. Prior stent to the LAD diagonal had mild in-stent restenosis. 30% disease is noted in the right coronary artery.   • Cancer (HCC) 03/05/2020    Esophagus CURRENT   • Chronic airway obstruction, not elsewhere classified    • Congestive heart failure (HCC)    • EMPHYSEMA     COPD   • Essential hypertension, benign 2/13/2014   • High cholesterol    • History of chronic cough    • HLD (hyperlipidemia) 2/13/2014   • Hypertension 2013    states well controlled on meds   • ADRYAN on CPAP 6/28/2017    bipap   • PAF (paroxysmal atrial fibrillation) (HCC) 7/26/2015    fixed with cardioversion, Dr Das follows   • Pneumonia     2014   • PONV (postoperative nausea and vomiting)    • Psychiatric problem     anxiety   • Unspecified disorder of thyroid 2010    on synthroid   • Unspecified hemorrhagic conditions     bruises easily related to brilinta       Past Surgical History:   Procedure Laterality Date   • ESOPHAGECTOMY THORASCOPIC N/A 6/22/2020    Procedure: ESOPHAGECTOMY, THORACOSCOPIC - NODE DISSECTION;  Surgeon: John H Ganser, M.D.;  Location: [facility];  Service: General   • PB LAP,GASTROSTOMY,W/O TUBE CONSTR N/A 4/14/2020    Procedure: CREATION, GASTROSTOMY, LAPAROSCOPIC-JEJUNOSTOMY PLACEMENT;  Surgeon: John H Ganser, M.D.;  Location: [facility];  Service: General   • PB ENDOSCOPIC US EXAM, ESOPH  3/6/2020    Procedure: EGD, WITH ENDOSCOPIC US - UPPER LINEAR RADIAL;  Surgeon: Gavino Cardenas M.D.;  Location: [facility];  Service: Gastroenterology   • GASTROSCOPY-ENDO  3/6/2020    Procedure: GASTROSCOPY - W/ESOPHAGEAL STENTING AND BIOPSIES;  Surgeon: Gavino Cardenas M.D.;  Location: [facility];  Service: Gastroenterology   • EGD WITH ASP/BX   3/6/2020    Procedure: EGD, WITH ASPIRATION BIOPSY - FNA;  Surgeon: Gavino Cardenas M.D.;  Location: SURGERY [facility];  Service: Gastroenterology   • MULTIPLE CORONARY ARTERY BYPASS ENDO VEIN HARVEST N/A 2015    Procedure: MULTIPLE CORONARY ARTERY BYPASS ENDO VEIN HARVEST;  Surgeon: Deuce Tam M.D.;  Location: SURGERY [facility];  Service:    • CERVICAL DISK AND FUSION ANTERIOR  10/13/2014    Performed by Moises Kerns M.D. at SURGERY [facility]   • OTHER ORTHOPEDIC SURGERY  2012- 5 surgeries- right knee x3, left knee x2    • COLONOSCOPY     • OTHER CARDIAC SURGERY      cardiac stents   • DENTAL SURGERY      wisdom teeth   • TONSILLECTOMY     • HERNIA REPAIR      'born with hernia'       Family History   Problem Relation Age of Onset   • Heart Attack Father    • Cancer Mother        Social History     Socioeconomic History   • Marital status:      Spouse name: Not on file   • Number of children: 3   • Years of education: Not on file   • Highest education level: Not on file   Occupational History   • Not on file   Social Needs   • Financial resource strain: Not on file   • Food insecurity     Worry: Not on file     Inability: Not on file   • Transportation needs     Medical: Not on file     Non-medical: Not on file   Tobacco Use   • Smoking status: Former Smoker     Packs/day: 2.00     Years: 20.00     Pack years: 40.00     Types: Cigarettes, Cigars     Quit date: 1995     Years since quittin.9   • Smokeless tobacco: Never Used   • Tobacco comment: Continued abstinence advised   Substance and Sexual Activity   • Alcohol use: Not Currently   • Drug use: Yes     Types: Inhaled     Comment:  medical marijuana - last use 20   • Sexual activity: Not on file   Lifestyle   • Physical activity     Days per week: Not on file     Minutes per session: Not on file   • Stress: Not on file   Relationships   • Social connections     Talks on  "phone: Not on file     Gets together: Not on file     Attends Gnosticism service: Not on file     Active member of club or organization: Not on file     Attends meetings of clubs or organizations: Not on file     Relationship status: Not on file   • Intimate partner violence     Fear of current or ex partner: Not on file     Emotionally abused: Not on file     Physically abused: Not on file     Forced sexual activity: Not on file   Other Topics Concern   • Not on file   Social History Narrative   • Not on file       Allergies as of 12/09/2020 - Reviewed 12/09/2020   Allergen Reaction Noted   • Morphine Anxiety and Unspecified 10/14/2014        @Vital signs for this encounter:  Vitals:    12/09/20 1255   Height: 1.88 m (6' 2\")   Weight: 99.3 kg (219 lb)   Weight % change since last entry.: 0 %   BP: 120/76   Pulse: 65   BMI (Calculated): 28.12   Resp: 16       Current medications as of today   Current Outpatient Medications   Medication Sig Dispense Refill   • SPIRIVA HANDIHALER 18 MCG Cap INHALE THE CONTENTS OF 1  CAPSULE BY MOUTH VIA  HANDIHALER DAILY 90 Cap 3   • temazepam (RESTORIL) 22.5 MG capsule Take 1 Cap by mouth at bedtime as needed for Sleep for up to 23 days. 23 Cap 0   • fluticasone-salmeterol (ADVAIR) 500-50 MCG/DOSE AEROSOL POWDER, BREATH ACTIVATED Inhale 1 Puff by mouth 2 times a day. 3 Each 3   • doxazosin (CARDURA) 4 MG Tab      • furosemide (LASIX) 20 MG Tab      • HYDROcodone-acetaminophen 2.5-108 mg/5mL (HYCET) 7.5-325 MG/15ML solution      • levoFLOXacin (LEVAQUIN) 750 MG tablet      • metoprolol SR (TOPROL XL) 25 MG TABLET SR 24 HR      • albuterol (PROVENTIL) 2.5mg/3ml Nebu Soln solution for nebulization 3 mL by Nebulization route every four hours as needed for Shortness of Breath. 120 mL 11   • azithromycin (ZITHROMAX) 250 MG Tab Take 2 tablets on day 1, then take 1 tablet a day for 4 days. 6 Tab 0   • atorvastatin (LIPITOR) 80 MG tablet Take 80 mg by mouth every evening.     • Albuterol " Sulfate (PROAIR RESPICLICK) 108 (90 Base) MCG/ACT AEROSOL POWDER, BREATH ACTIVATED Inhale 2 Puffs by mouth as needed (for shortness of breath, wheezing.). every 4-6 hours as needed. 1 Each 3   • acetaminophen (TYLENOL) 500 MG Tab Take 500-1,000 mg by mouth every 6 hours as needed.     • potassium chloride (MICRO-K) 10 MEQ capsule      • BIOTIN PO Take 1,000 mcg by mouth.     • ezetimibe (ZETIA) 10 MG Tab Take 10 mg by mouth every day.     • BRILINTA 90 MG Tab tablet Take 90 mg by mouth 2 Times a Day. Indications: Acute Coronary Syndrome     • benazepril (LOTENSIN) 20 MG Tab Take 1 Tab by mouth every day. 90 Tab 3   • nitroglycerin (NITROSTAT) 0.4 MG SL Tab Place 1 Tab under tongue as needed for Chest Pain (Place 1 tablet under the tongue every 5 minutes up to 3 doses as needed for chest pain). 75 Tab 3   • omeprazole (PRILOSEC) 40 MG delayed-release capsule Take 40 mg by mouth every day.     • levothyroxine (SYNTHROID) 75 MCG Tab Take 75 mcg by mouth Every morning on an empty stomach.     • Melatonin 10 MG Tab Take 10 mg by mouth every bedtime.     • Cranberry 500 MG Cap Take 1 Cap by mouth every day.     • Zinc 50 MG Cap Take 50 mg by mouth every day.     • diphenhydrAMINE (BENADRYL) 25 MG Tab Take 25 mg by mouth every bedtime.     • aspirin EC (ECOTRIN) 81 MG TBEC Take 81 mg by mouth every day.     • tamsulosin (FLOMAX) 0.4 MG capsule Take 0.4 mg by mouth ONE-HALF HOUR AFTER BREAKFAST.     • methylPREDNISolone (MEDROL DOSEPAK) 4 MG Tablet Therapy Pack Take as directed. (Patient not taking: Reported on 12/9/2020) 21 Tab 0     No current facility-administered medications for this visit.          Physical Exam:  Gen:           Alert and oriented, No apparent distress. Mood and affect appropriate, normal interaction with provider.  Eyes:          sclere white, conjunctive moist.  Hearing:     Grossly intact.  Dentition:    Fair dentition.  Oropharynx:   Tongue normal, posterior pharynx without erythema or  exudate.  Neck:        Supple, trachea midline, no masses.  Respiratory Effort: No intercostal retractions or use of accessory muscles.   Lung Auscultation:      Diminished; few rhonchi right middle lobe, no rales or wheezing.  CV:            Regular rate and rhythm. No edema. No murmurs, rubs or gallops.  Digits, Nails, Ext: No clubbing, cyanosis, petechiae, or nodes.   Skin:        No rashes, lesions or ulcers noted on exposed skin surfaces.                     Assessment:  1. Chronic obstructive pulmonary disease, unspecified COPD type (HCC)     2. Central sleep apnea     3. Insomnia, unspecified type  temazepam (RESTORIL) 22.5 MG capsule   4. Former smoker     5. Malignant neoplasm of lower third of esophagus (HCC)         Immunizations:    Flu: 12/17/2018  Pneumovax 23: Recommended/has not obtained  Prevnar 13: Has not obtained  PCV 7: 1/1/2010    Plan:   65 y.o. year old male here today for follow-up on COPD, central sleep apnea and insomnia.  Last seen in clinic 9/8/2020 and had requested annual follow-up which was not realistic for management of his current health issues.  He is a former smoker with reported 40-pack-year history and quit date in 1999.    Former smoker: Continues to remain abstinent.    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  History also includes multiple MIs including STEMI with 2 stents placed, diastolic heart failure, cardiomyopathy, paroxysmal atrial fibrillation, thoracentesis 2015.      Reviewed in clinic vitals including blood pressure 120/76, heart rate of 65, O2 sat of 95% and BMI of 28.12 kg/m².  Previous BMI as high as 36.  Reports weight has stabilized now.    Reviewed home medication regimen including albuterol nebulizer, albuterol inhaler, omeprazole, Spiriva, Advair, Lasix and temazepam.  Patient reports unable to obtain same dosing in  [place].  Some issues with refills in [place] see prior notes.  Again recommended sleep provider follow-up for discussion of management including possible alternatives.    COPD: Continue current regimen.  Using inhaler 2-3 times per day, nebulizer at home.  No need for refills at this time.  Has antibiotics on hand if needed to initiate for exacerbation.    Reviewed most recent imaging including CT scan report from [facility] obtained 9/24/2020 demonstrating surgical intervention on the esophagus and stomach, esophagus is dilated with air and fluid, small right sided pleural effusion, atelectatic changes involving right lung, minimal atelectasis left lung base and small left pleural effusion, few small mediastinal lymph nodes measuring up to 5 mm in diameter.  There is a 3.4 x 2.3 cm lesion posterior to the transverse colon in the previous location of the stomach which may represent a remnant left behind or postoperative seroma, old hematoma or less likely an abscess.  Marked diverticulosis in the colon.    Malignant neoplasm of esophagus: Status post esophagectomy with gastric pull-through, patient continues to follow with Dr. Arias and has follow-up CT scan next week.    Chest x-ray obtained 7/24/2020 demonstrated persistent but improved infiltrate of the right lower lung, small pleural effusions right greater than left.      Chest x-ray obtained 7/08/2020 demonstrated new bilateral small pleural effusions right greater than left, new infiltrate right middle and right lower lobe worrisome for pneumonia.     CT scan of the chest with contrast obtained 6/2/2020 demonstrated mild thickening to the wall of the esophagus in the region of the gastroesophageal junction, some scarring and atelectasis at the lung bases which is a new finding when compared to 2/27/2020.    Pulmonary function testing obtained 6/26/2019 demonstrated FEV1 of 2.85 L or 74% predicted, FVC of 4.25 L or 84% predicted, FEV1/FVC ratio 67, no  significant bronchodilator response, flow volume loop consistent with obstruction.  Residual volume 168% predicted, total lung capacity 114% predicted, DLCO 134% predicted.  Per pulmonologist interpretation moderate obstructive ventilatory defect consistent with listed diagnosis of COPD.    Central sleep apnea: Did not address central sleep apnea at this appointment as patient advised to follow-up with sleep provider.      Insomnia: Lengthy discussion regarding limitations in refilling temazepam including while out of town or traveling.  Did provide refill temazepam for his insomnia pending follow-up appointment with sleep provider.      Reviewed COVID-19 precautions including wearing mask in public, social distancing, frequent handwashing, obtaining flu shot.    Patient to follow-up with sleep doctor in 2 months.  Recommend follow-up in pulmonary clinic in 4 months.  Schedule after sleep appointment.    This dictation was created using voice recognition software. The accuracy of the dictation is limited to the abilities of the software. I expect there may be some errors of grammar and possibly content.

## 2021-02-24 DIAGNOSIS — G47.00 INSOMNIA, UNSPECIFIED TYPE: ICD-10-CM

## 2021-02-24 RX ORDER — TEMAZEPAM 22.5 MG/1
CAPSULE ORAL
Qty: 30 CAPSULE | Refills: 2 | OUTPATIENT
Start: 2021-02-24 | End: 2021-03-26

## 2021-02-24 NOTE — TELEPHONE ENCOUNTER
Have we ever prescribed this med? Yes.  If yes, what date? 12/22/2020    Last OV: 12/09/2020 - ELVIRA JAMISON    Next OV: 03/08/2021 - Dr. Marquez     DX: Insomnia    Medications: Restoril       =============    2/27/2021  Received page from hospital  patient requesting refill of above rx. Notes reviewed. Controlled substance risks and contract discussed with patient. 10d supply no refills submitted to [facility] pharmacy on [address]. Pt has appt on 3/8/21 and will need to discuss further refills with Dr Marquez.    Orders Placed This Encounter   • temazepam (RESTORIL) 22.5 MG capsule     __________  Sebastian Chopra MD  Pulmonary and Critical Care Medicine  [facility]

## 2021-02-27 RX ORDER — TEMAZEPAM 22.5 MG/1
22.5 CAPSULE ORAL NIGHTLY PRN
Qty: 10 CAPSULE | Refills: 0 | Status: SHIPPED | OUTPATIENT
Start: 2021-02-27 | End: 2021-03-09

## 2021-03-08 ENCOUNTER — SLEEP CENTER VISIT (OUTPATIENT)
Dept: SLEEP MEDICINE | Facility: MEDICAL CENTER | Age: 66
End: 2021-03-08
Payer: COMMERCIAL

## 2021-03-08 VITALS
SYSTOLIC BLOOD PRESSURE: 122 MMHG | HEART RATE: 68 BPM | HEIGHT: 74 IN | WEIGHT: 224 LBS | RESPIRATION RATE: 16 BRPM | BODY MASS INDEX: 28.75 KG/M2 | OXYGEN SATURATION: 95 % | DIASTOLIC BLOOD PRESSURE: 72 MMHG

## 2021-03-08 DIAGNOSIS — F51.04 CHRONIC INSOMNIA: ICD-10-CM

## 2021-03-08 DIAGNOSIS — G47.33 OSA (OBSTRUCTIVE SLEEP APNEA): ICD-10-CM

## 2021-03-08 PROCEDURE — 99204 OFFICE O/P NEW MOD 45 MIN: CPT | Performed by: FAMILY MEDICINE

## 2021-03-08 RX ORDER — TRAZODONE HYDROCHLORIDE 100 MG/1
100 TABLET ORAL
Qty: 30 TABLET | Refills: 1 | Status: SHIPPED | OUTPATIENT
Start: 2021-03-08 | End: 2021-04-15 | Stop reason: SDUPTHER

## 2021-03-08 RX ORDER — TEMAZEPAM 15 MG/1
15 CAPSULE ORAL NIGHTLY PRN
Qty: 30 CAPSULE | Refills: 0 | Status: SHIPPED | OUTPATIENT
Start: 2021-03-08 | End: 2021-04-07

## 2021-03-08 ASSESSMENT — FIBROSIS 4 INDEX: FIB4 SCORE: 2.13

## 2021-03-08 NOTE — PROGRESS NOTES
"      [facility]  Consult Note     Date: 3/8/2021 / Time: 1:37 PM    Patient ID:   Name:             Norberto White   YOB: 1955  Age:                 65 y.o.  male   MRN:               [id number]      Thank you for requesting a sleep medicine consultation on Norberto White at the sleep center.He presents today with the chief complaints of ADRYAN and to establish as a new pt. He was previously seen by Nati Peña and was last seen on 12/18/18. Polysomnogram was completed on 12/11/2016 and indicated evidence of mild sleep apnea with an AHI of 14.7 with a low 02 of 84%. He had an incomplete titration to CPAP with development on centrals with airway pressurization. He had 54% centals during the titration portion. He is currently on Auto SV with EPAP min/max 7/15, PS min/max 3/13.The initial presenting symptoms were snoring and insomnia.He is referred by Dr. Fenton for evaluation and treatment of sleep disorder breathing.     HISTORY OF PRESENT ILLNESS:       At night,  Norberto White goes to bed around 10:30 pm on weekdays andon the weekends. He gets out of bed at 6-7 am on weekdays and on the weekends.  He  Averages about 7-8 hrs of sleep on a good night and 5 hrs on a bad night. Pt has bad nights about 1 nights per week. He falls asleep within 60 minutes. He uses temazepam 22.5 mg, benadryl and melatonin for sleep. He has tried ambien in past but he had hallucinations on it. He has been on this regiment for last 2+ years. He has trouble staying asleep as well and can wake up with even light noises. He wakes up about 1 times during the night due to no obvious reason and on average It takes him few mins to hours to fall back asleep.     He is not aware of snoring/breathing pauses/gasping or choking in sleep since he has been on the ASV.  He  denies any symptoms of restless legs syndrome such as an \"urge to move\"  He  legs in the evening or bedtime. He  denies any symptoms of " narcolepsy such as sleep paralysis or cataplexy, or any symptoms to suggest parasomnias such as sleep walking or acting out of dreams.   Overall, he  doesnot finds his sleep refreshing.Rogers sleepiness scale score is normal  2/24.He does not take regular naps.  We could not do the compliance download but we have requested it from the DME.    PFT's from 6/2018 are improved in comparison to 2016, but he did take medication prior to testing, and indicate a FEV1 of 2.36L or 62% predicted with a moderate bronchodilator response, FEV1/FVC ratio of 63 with a DLCO of 120%. He is currently on spireva and albuterol. He has a history of CAD and Atrial Fibrillation and is followed by Cardiology  REVIEW OF SYSTEMS:       Constitutional: Denies fevers, Denies weight changes  Eyes: Denies changes in vision, no eye pain  Ears/Nose/Throat/Mouth: Denies nasal congestion or sore throat   Cardiovascular: Denies chest pain or palpitations   Respiratory: Denies shortness of breath , Denies cough  Gastrointestinal/Hepatic: Denies abdominal pain, nausea, vomiting, diarrhea, constipation or GI bleeding   Genitourinary: Denies bladder dysfunction, dysuria or frequency  Musculoskeletal/Rheum: Denies  joint pain and swelling   Skin/Breast: Denies rash  Neurological: Denies headache, confusion, memory loss or focal weakness/parasthesias  Psychiatric: denies mood disorder     Comprehensive review of systems form is reviewed with the patient and is attached in the EMR.     PMH:  has a past medical history of Acute MI, inferolateral wall, initial episode of care ([facility]), Anesthesia, Arthritis (03/05/2020), ASTHMA, Breath shortness, Bronchitis (2014), CAD (coronary artery disease) (2/13/2014), Cancer ([facility]) (03/05/2020), Chronic airway obstruction, not elsewhere classified, Congestive heart failure ([facility]), EMPHYSEMA, Essential hypertension, benign (2/13/2014), High cholesterol, History of chronic cough, HLD (hyperlipidemia) (2/13/2014), Hypertension  (2013), ADRYAN on CPAP (6/28/2017), PAF (paroxysmal atrial fibrillation) ([facility]) (7/26/2015), Pneumonia, PONV (postoperative nausea and vomiting), Psychiatric problem, Unspecified disorder of thyroid (2010), and Unspecified hemorrhagic conditions.  MEDS:   Current Outpatient Medications:   •  temazepam (RESTORIL) 22.5 MG capsule, Take 1 capsule by mouth at bedtime as needed for Sleep for up to 10 days., Disp: 10 capsule, Rfl: 0  •  metoprolol SR (TOPROL XL) 25 MG TABLET SR 24 HR, , Disp: , Rfl:   •  albuterol (PROVENTIL) 2.5mg/3ml Nebu Soln solution for nebulization, 3 mL by Nebulization route every four hours as needed for Shortness of Breath., Disp: 120 mL, Rfl: 11  •  atorvastatin (LIPITOR) 80 MG tablet, Take 80 mg by mouth every evening., Disp: , Rfl:   •  Albuterol Sulfate (PROAIR RESPICLICK) 108 (90 Base) MCG/ACT AEROSOL POWDER, BREATH ACTIVATED, Inhale 2 Puffs by mouth as needed (for shortness of breath, wheezing.). every 4-6 hours as needed., Disp: 1 Each, Rfl: 3  •  acetaminophen (TYLENOL) 500 MG Tab, Take 500-1,000 mg by mouth every 6 hours as needed., Disp: , Rfl:   •  potassium chloride (MICRO-K) 10 MEQ capsule, , Disp: , Rfl:   •  BIOTIN PO, Take 1,000 mcg by mouth., Disp: , Rfl:   •  ezetimibe (ZETIA) 10 MG Tab, Take 10 mg by mouth every day., Disp: , Rfl:   •  omeprazole (PRILOSEC) 40 MG delayed-release capsule, Take 40 mg by mouth every day., Disp: , Rfl:   •  levothyroxine (SYNTHROID) 75 MCG Tab, Take 75 mcg by mouth Every morning on an empty stomach., Disp: , Rfl:   •  Melatonin 10 MG Tab, Take 10 mg by mouth every bedtime., Disp: , Rfl:   •  Zinc 50 MG Cap, Take 50 mg by mouth every day., Disp: , Rfl:   •  diphenhydrAMINE (BENADRYL) 25 MG Tab, Take 25 mg by mouth every bedtime., Disp: , Rfl:   •  aspirin EC (ECOTRIN) 81 MG TBEC, Take 81 mg by mouth every day., Disp: , Rfl:   •  tamsulosin (FLOMAX) 0.4 MG capsule, Take 0.4 mg by mouth ONE-HALF HOUR AFTER BREAKFAST., Disp: , Rfl:   •  SPIRIVA HANDIHALER  18 MCG Cap, INHALE THE CONTENTS OF 1  CAPSULE BY MOUTH VIA  HANDIHALER DAILY, Disp: 90 Cap, Rfl: 3  •  fluticasone-salmeterol (ADVAIR) 500-50 MCG/DOSE AEROSOL POWDER, BREATH ACTIVATED, Inhale 1 Puff by mouth 2 times a day., Disp: 3 Each, Rfl: 3  •  doxazosin (CARDURA) 4 MG Tab, , Disp: , Rfl:   •  furosemide (LASIX) 20 MG Tab, , Disp: , Rfl:   •  HYDROcodone-acetaminophen 2.5-108 mg/5mL (HYCET) 7.5-325 MG/15ML solution, , Disp: , Rfl:   •  levoFLOXacin (LEVAQUIN) 750 MG tablet, , Disp: , Rfl:   •  methylPREDNISolone (MEDROL DOSEPAK) 4 MG Tablet Therapy Pack, Take as directed. (Patient not taking: Reported on 12/9/2020), Disp: 21 Tab, Rfl: 0  •  azithromycin (ZITHROMAX) 250 MG Tab, Take 2 tablets on day 1, then take 1 tablet a day for 4 days., Disp: 6 Tab, Rfl: 0  •  BRILINTA 90 MG Tab tablet, Take 90 mg by mouth 2 Times a Day. Indications: Acute Coronary Syndrome, Disp: , Rfl:   •  benazepril (LOTENSIN) 20 MG Tab, Take 1 Tab by mouth every day., Disp: 90 Tab, Rfl: 3  •  nitroglycerin (NITROSTAT) 0.4 MG SL Tab, Place 1 Tab under tongue as needed for Chest Pain (Place 1 tablet under the tongue every 5 minutes up to 3 doses as needed for chest pain)., Disp: 75 Tab, Rfl: 3  •  Cranberry 500 MG Cap, Take 1 Cap by mouth every day., Disp: , Rfl:   ALLERGIES:   Allergies   Allergen Reactions   • Morphine Anxiety and Unspecified     Pt becomes irritable  Agitation; hot flashes.     SURGHX:   Past Surgical History:   Procedure Laterality Date   • ESOPHAGECTOMY THORASCOPIC N/A 6/22/2020    Procedure: ESOPHAGECTOMY, THORACOSCOPIC - NODE DISSECTION;  Surgeon: John H Ganser, M.D.;  Location: SURGERY [facility];  Service: General   • PB LAP,GASTROSTOMY,W/O TUBE CONSTR N/A 4/14/2020    Procedure: CREATION, GASTROSTOMY, LAPAROSCOPIC-JEJUNOSTOMY PLACEMENT;  Surgeon: John H Ganser, M.D.;  Location: SURGERY [facility];  Service: General   • PB ENDOSCOPIC US EXAM, ESOPH  3/6/2020    Procedure: EGD, WITH ENDOSCOPIC US - UPPER  "LINEAR RADIAL;  Surgeon: Gavino Cardenas M.D.;  Location: [facility];  Service: Gastroenterology   • GASTROSCOPY-ENDO  3/6/2020    Procedure: GASTROSCOPY - W/ESOPHAGEAL STENTING AND BIOPSIES;  Surgeon: Gavino Cardenas M.D.;  Location: [facility];  Service: Gastroenterology   • EGD WITH ASP/BX  3/6/2020    Procedure: EGD, WITH ASPIRATION BIOPSY - FNA;  Surgeon: Gavino Cardenas M.D.;  Location: [facility];  Service: Gastroenterology   • MULTIPLE CORONARY ARTERY BYPASS ENDO VEIN HARVEST N/A 7/23/2015    Procedure: MULTIPLE CORONARY ARTERY BYPASS ENDO VEIN HARVEST;  Surgeon: Deuce Tam M.D.;  Location: [facility];  Service:    • CERVICAL DISK AND FUSION ANTERIOR  10/13/2014    Performed by Moises Kerns M.D. at [facility]   • OTHER ORTHOPEDIC SURGERY  2012 1975-present 5 surgeries- right knee x3, left knee x2    • COLONOSCOPY  2009   • OTHER CARDIAC SURGERY  2008    cardiac stents   • DENTAL SURGERY  1971    wisdom teeth   • TONSILLECTOMY  1958   • HERNIA REPAIR  1955    'born with hernia'     SOCHX:  reports that he quit smoking about 26 years ago. His smoking use included cigarettes and cigars. He has a 40.00 pack-year smoking history. He has never used smokeless tobacco. He reports previous alcohol use. He reports current drug use. Drug: Inhaled.   FH:   Family History   Problem Relation Age of Onset   • Heart Attack Father    • Cancer Mother        Physical Exam:  Vitals/ General Appearance:   Weight/BMI: Body mass index is 28.76 kg/m².  /72 (BP Location: Right arm, Patient Position: Sitting, BP Cuff Size: Adult)   Pulse 68   Resp 16   Ht 1.88 m (6' 2\")   Wt 102 kg (224 lb)   SpO2 95%   Vitals:    03/08/21 1314   BP: 122/72   BP Location: Right arm   Patient Position: Sitting   BP Cuff Size: Adult   Pulse: 68   Resp: 16   SpO2: 95%   Weight: 102 kg (224 lb)   Height: 1.88 m (6' 2\")           Constitutional: Alert, no " distress, well-groomed.  Skin: No rashes in visible areas.  Eye: Round. Conjunctiva clear, lids normal. No icterus.   ENMT: Lips pink without lesions, good dentition, moist mucous membranes. Phonation normal.  Neck: No masses, no thyromegaly. Moves freely without pain.  CV: Pulse as reported by patient  Respiratory: Unlabored respiratory effort, no cough or audible wheeze  Psych: Alert and oriented x3, normal affect and mood.   INVESTIGATIONS:       ASSESSMENT AND PLAN       1. Sleep Apnea .   The pathophysiology of sleep anea and the increased risk of cardiovascular morbidity from untreated sleep apnea is discussed in detail with the patient. He is urged to avoid supine sleep, weight gain and alcoholic beverages since all of these can worsen sleep apnea. He is cautioned against drowsy driving. If He feels sleepy while driving, He must pull over for a break/nap, rather than persist on the road, in the interest of He own safety and that of others on the road.   Plan   - Continue Auto SV with EPAP min/max 7/15, PS min/max 3/13   - compliance download was reviewed and discussed with the pt   - compliance was reinforced     2. Chronic insomnia: The evolution of psychophysiological insomnia is discussed in detail. The importance of cognitive restructuring and behavior modification therapy is emphasized for long-term improvement rather than the use of hypnotic agents, which may offer short term relief but may lead to the development of tolerance and side effects with prolonged use. Evening exercise, wind-down before bedtime, and stimulus control (leaving the bed when unable to fall back asleep at night) are explained.      Plan   -Handouts on stimulus control and sleep hygiene are given and a couple of titles of books on insomnia are recommended, written by behavioral psychologists.    - recommended to wean off temazepam and starting on trazodone 100 mg    BAO TEMAZEPAM 1 PILL EVERY OTHER DAY X 3-4 DAYS THEN DECREASE TO 1  PILL EVER 2 DAYS X 3-4 DAYS THEN DECREASE TO 1 PILL EVERY 3 DAY X 3-4 DAYS. IN MEANTIME START THE TRAZODONE 100 MG AT NIGHT TIME. DO NOT USE BENADRYL OR MELATONIN WHILE ON TEMAZEPAM AND TRAZODONE.

## 2021-03-08 NOTE — PATIENT INSTRUCTIONS
TAKE TEMAZEPAM 1 PILL EVERY OTHER DAY X 3-4 DAYS THEN DECREASE TO 1 PILL EVER 2 DAYS X 3-4 DAYS THEN DECREASE TO 1 PILL EVERY 3 DAY X 3-4 DAYS. IN MEANTIME START THE TRAZODONE 100 MG AT NIGHT TIME. DO NOT USE BENADRYL OR MELATONIN WHILE ON TEMAZEPAM AND TRAZODONE.     Trazodone tablets  What is this medicine?  TRAZODONE (TRAZ oh done) is used to treat depression.  This medicine may be used for other purposes; ask your health care provider or pharmacist if you have questions.  COMMON BRAND NAME(S): Mansoor  What should I tell my health care provider before I take this medicine?  They need to know if you have any of these conditions:  · attempted suicide or thinking about it  · bipolar disorder  · bleeding problems  · glaucoma  · heart disease, or previous heart attack  · irregular heart beat  · kidney or liver disease  · low levels of sodium in the blood  · an unusual or allergic reaction to trazodone, other medicines, foods, dyes or preservatives  · pregnant or trying to get pregnant  · breast-feeding  How should I use this medicine?  Take this medicine by mouth with a glass of water. Follow the directions on the prescription label. Take this medicine shortly after a meal or a light snack. Take your medicine at regular intervals. Do not take your medicine more often than directed. Do not stop taking this medicine suddenly except upon the advice of your doctor. Stopping this medicine too quickly may cause serious side effects or your condition may worsen.  A special MedGuide will be given to you by the pharmacist with each prescription and refill. Be sure to read this information carefully each time.  Talk to your pediatrician regarding the use of this medicine in children. Special care may be needed.  Overdosage: If you think you have taken too much of this medicine contact a poison control center or emergency room at once.  NOTE: This medicine is only for you. Do not share this medicine with others.  What if I miss  a dose?  If you miss a dose, take it as soon as you can. If it is almost time for your next dose, take only that dose. Do not take double or extra doses.  What may interact with this medicine?  Do not take this medicine with any of the following medications:  · certain medicines for fungal infections like fluconazole, itraconazole, ketoconazole, posaconazole, voriconazole  · cisapride  · dronedarone  · linezolid  · MAOIs like Carbex, Eldepryl, Marplan, Nardil, and Parnate  · mesoridazine  · methylene blue (injected into a vein)  · pimozide  · saquinavir  · thioridazine  This medicine may also interact with the following medications:  · alcohol  · antiviral medicines for HIV or AIDS  · aspirin and aspirin-like medicines  · barbiturates like phenobarbital  · certain medicines for blood pressure, heart disease, irregular heart beat  · certain medicines for depression, anxiety, or psychotic disturbances  · certain medicines for migraine headache like almotriptan, eletriptan, frovatriptan, naratriptan, rizatriptan, sumatriptan, zolmitriptan  · certain medicines for seizures like carbamazepine and phenytoin  · certain medicines for sleep  · certain medicines that treat or prevent blood clots like dalteparin, enoxaparin, warfarin  · digoxin  · fentanyl  · lithium  · NSAIDS, medicines for pain and inflammation, like ibuprofen or naproxen  · other medicines that prolong the QT interval (cause an abnormal heart rhythm) like dofetilide  · rasagiline  · supplements like Maguayo's wort, kava kava, valerian  · tramadol  · tryptophan  This list may not describe all possible interactions. Give your health care provider a list of all the medicines, herbs, non-prescription drugs, or dietary supplements you use. Also tell them if you smoke, drink alcohol, or use illegal drugs. Some items may interact with your medicine.  What should I watch for while using this medicine?  Tell your doctor if your symptoms do not get better or if they  get worse. Visit your doctor or health care professional for regular checks on your progress. Because it may take several weeks to see the full effects of this medicine, it is important to continue your treatment as prescribed by your doctor.  Patients and their families should watch out for new or worsening thoughts of suicide or depression. Also watch out for sudden changes in feelings such as feeling anxious, agitated, panicky, irritable, hostile, aggressive, impulsive, severely restless, overly excited and hyperactive, or not being able to sleep. If this happens, especially at the beginning of treatment or after a change in dose, call your health care professional.  You may get drowsy or dizzy. Do not drive, use machinery, or do anything that needs mental alertness until you know how this medicine affects you. Do not stand or sit up quickly, especially if you are an older patient. This reduces the risk of dizzy or fainting spells. Alcohol may interfere with the effect of this medicine. Avoid alcoholic drinks.  This medicine may cause dry eyes and blurred vision. If you wear contact lenses you may feel some discomfort. Lubricating drops may help. See your eye doctor if the problem does not go away or is severe.  Your mouth may get dry. Chewing sugarless gum, sucking hard candy and drinking plenty of water may help. Contact your doctor if the problem does not go away or is severe.  What side effects may I notice from receiving this medicine?  Side effects that you should report to your doctor or health care professional as soon as possible:  · allergic reactions like skin rash, itching or hives, swelling of the face, lips, or tongue  · elevated mood, decreased need for sleep, racing thoughts, impulsive behavior  · confusion  · fast, irregular heartbeat  · feeling faint or lightheaded, falls  · feeling agitated, angry, or irritable  · loss of balance or coordination  · painful or prolonged  erections  · restlessness, pacing, inability to keep still  · suicidal thoughts or other mood changes  · tremors  · trouble sleeping  · seizures  · unusual bleeding or bruising  Side effects that usually do not require medical attention (report to your doctor or health care professional if they continue or are bothersome):  · change in sex drive or performance  · change in appetite or weight  · constipation  · headache  · muscle aches or pains  · nausea  This list may not describe all possible side effects. Call your doctor for medical advice about side effects. You may report side effects to FDA at 8-891-SUT-1422.  Where should I keep my medicine?  Keep out of the reach of children.  Store at room temperature between 15 and 30 degrees C (59 to 86 degrees F). Protect from light. Keep container tightly closed. Throw away any unused medicine after the expiration date.  NOTE: This sheet is a summary. It may not cover all possible information. If you have questions about this medicine, talk to your doctor, pharmacist, or health care provider.  © 2020 [employer] (2019-12-10 11:46:46)

## 2021-03-15 ENCOUNTER — HOSPITAL ENCOUNTER (OUTPATIENT)
Dept: RADIATION ONCOLOGY | Facility: MEDICAL CENTER | Age: 66
End: 2021-03-31
Attending: RADIOLOGY
Payer: COMMERCIAL

## 2021-03-15 VITALS — BODY MASS INDEX: 28.23 KG/M2 | HEIGHT: 74 IN | WEIGHT: 220 LBS

## 2021-03-15 DIAGNOSIS — C15.5 MALIGNANT NEOPLASM OF LOWER THIRD OF ESOPHAGUS (HCC): ICD-10-CM

## 2021-03-15 PROCEDURE — 99214 OFFICE O/P EST MOD 30 MIN: CPT | Mod: 95 | Performed by: RADIOLOGY

## 2021-03-15 RX ORDER — OMEPRAZOLE 40 MG/1
40 CAPSULE, DELAYED RELEASE ORAL 2 TIMES DAILY
Qty: 180 CAPSULE | Refills: 2 | Status: SHIPPED | OUTPATIENT
Start: 2021-03-15 | End: 2021-11-24

## 2021-03-15 RX ORDER — CLOPIDOGREL BISULFATE 75 MG/1
TABLET ORAL
COMMUNITY
Start: 2021-02-01

## 2021-03-15 ASSESSMENT — PAIN SCALES - GENERAL: PAINLEVEL: NO PAIN

## 2021-03-15 ASSESSMENT — FIBROSIS 4 INDEX: FIB4 SCORE: 2.13

## 2021-03-15 NOTE — PROGRESS NOTES
Virtual Visit: Established Patient   This visit was conducted via Zoom using secure and encrypted videoconferencing technology. The patient was in a private location in the [facility].    The patient's identity was confirmed and verbal consent was obtained for this virtual visit.    Subjective:   CC:   Chief Complaint   Patient presents with   • Cancer     follow up       Norberto White is a 65 y.o. male presenting for evaluation and management of:    Stage 3 esophageal cancer s/p preoperative chemoradiation followed by surgery with [id number] negative margins 6/22/20     HISTORY OF PRESENT ILLNESS:  64-year-old male who originally presented with dysphasia around [place] time underwent first endoscopy by Dr. Hernandez on February 7, 2020 with pathology showing adenocarcinoma.  Patient had staging CT chest abdomen pelvis February 27, 2020 showed no evidence of metastatic disease but did show mild prominence to low wall of esophageal junction.  Patient had endoscopic ultrasound with Dr. Cardenas on March 6, 2020 which showed clinical T3 N0 M0 adenocarcinoma in the distal esophageal junction.  Patient was seen by Dr. Bates who discussed chemoradiation therapy and ordered PET CT scan but due to insurance reasons has not been able to get it.  He currently has dysphasia to solids greater than liquids but has no major weight loss or bone pain.     8/10/200  Patient completed chemoradiation therapy on May 6, 2020.  He underwent distal esophagectomy and proximal gastrectomy on June 22, 2020 with a 1.5 cm residual tumor grade 3 invading adventitia with negative margins and treatment effect with residual cancer evident overall partial response score 2.  Lymphovascular space invasion was noted and 2 of 19 lymph nodes were involved, [id number].  Patient is still struggling with weight loss and has his feeding tube still in place.  He is taking his antacid twice a day.     11/5/20  Patient doing well he did have one  episode while he is on vacation and organ of hypoglycemia for which she went to the emergency department and they did a CAT scan which we do not have the report at this time.  He had a previous CT chest abdomen pelvis September 24, 2020 done at [facility] which I reviewed with him which did show some resolving pleural effusions as well as some small mediastinal lymph nodes that were nonspecific at this time.  Overall he is feeling well he is able to keep his weight up and he denies any fatigue or other changes in GI symptoms he does have some new visual changes due to his cataracts.    INTERVAL HISTORY:  Patient doing well with expected acid reflux after chemoradiation and surgery.  He takes omeprazole 40 mg twice daily.  He denies any bone pain or weight loss.  CT chest abdomen pelvis done at [facility] December 14, 2020 shows no evidence of disease recurrence stable surgical changes in the mediastinum.    ROS   Denies any recent fevers or chills. No nausea or vomiting. No chest pains or shortness of breath.     Allergies   Allergen Reactions   • Morphine Anxiety and Unspecified     Pt becomes irritable  Agitation; hot flashes.       Current medicines (including changes today)  Current Outpatient Medications   Medication Sig Dispense Refill   • clopidogrel (PLAVIX) 75 MG Tab      • traZODone (DESYREL) 100 MG Tab Take 1 tablet by mouth every bedtime. 30 tablet 1   • temazepam (RESTORIL) 15 MG Cap Take 1 capsule by mouth at bedtime as needed for Sleep for up to 30 days. 30 capsule 0   • SPIRIVA HANDIHALER 18 MCG Cap INHALE THE CONTENTS OF 1  CAPSULE BY MOUTH VIA  HANDIHALER DAILY 90 Cap 3   • fluticasone-salmeterol (ADVAIR) 500-50 MCG/DOSE AEROSOL POWDER, BREATH ACTIVATED Inhale 1 Puff by mouth 2 times a day. 3 Each 3   • doxazosin (CARDURA) 4 MG Tab      • furosemide (LASIX) 20 MG Tab      • HYDROcodone-acetaminophen 2.5-108 mg/5mL (HYCET) 7.5-325 MG/15ML solution      • levoFLOXacin (LEVAQUIN) 750 MG  tablet      • metoprolol SR (TOPROL XL) 25 MG TABLET SR 24 HR      • albuterol (PROVENTIL) 2.5mg/3ml Nebu Soln solution for nebulization 3 mL by Nebulization route every four hours as needed for Shortness of Breath. 120 mL 11   • methylPREDNISolone (MEDROL DOSEPAK) 4 MG Tablet Therapy Pack Take as directed. 21 Tab 0   • azithromycin (ZITHROMAX) 250 MG Tab Take 2 tablets on day 1, then take 1 tablet a day for 4 days. 6 Tab 0   • atorvastatin (LIPITOR) 80 MG tablet Take 80 mg by mouth every evening.     • Albuterol Sulfate (PROAIR RESPICLICK) 108 (90 Base) MCG/ACT AEROSOL POWDER, BREATH ACTIVATED Inhale 2 Puffs by mouth as needed (for shortness of breath, wheezing.). every 4-6 hours as needed. 1 Each 3   • acetaminophen (TYLENOL) 500 MG Tab Take 500-1,000 mg by mouth every 6 hours as needed.     • potassium chloride (MICRO-K) 10 MEQ capsule      • BIOTIN PO Take 1,000 mcg by mouth.     • ezetimibe (ZETIA) 10 MG Tab Take 10 mg by mouth every day.     • benazepril (LOTENSIN) 20 MG Tab Take 1 Tab by mouth every day. 90 Tab 3   • nitroglycerin (NITROSTAT) 0.4 MG SL Tab Place 1 Tab under tongue as needed for Chest Pain (Place 1 tablet under the tongue every 5 minutes up to 3 doses as needed for chest pain). 75 Tab 3   • omeprazole (PRILOSEC) 40 MG delayed-release capsule Take 40 mg by mouth every day.     • levothyroxine (SYNTHROID) 75 MCG Tab Take 75 mcg by mouth Every morning on an empty stomach.     • Melatonin 10 MG Tab Take 10 mg by mouth every bedtime.     • Cranberry 500 MG Cap Take 1 Cap by mouth every day.     • Zinc 50 MG Cap Take 50 mg by mouth every day.     • diphenhydrAMINE (BENADRYL) 25 MG Tab Take 25 mg by mouth every bedtime.     • aspirin EC (ECOTRIN) 81 MG TBEC Take 81 mg by mouth every day.     • tamsulosin (FLOMAX) 0.4 MG capsule Take 0.4 mg by mouth ONE-HALF HOUR AFTER BREAKFAST.     • BRILINTA 90 MG Tab tablet Take 90 mg by mouth 2 Times a Day. Indications: Acute Coronary Syndrome       No current  facility-administered medications for this encounter.       Patient Active Problem List    Diagnosis Date Noted   • STEMI (ST elevation myocardial infarction) ([facility]) 07/23/2015     Priority: High   • Cardiomyopathy, ischemic 07/25/2015     Priority: Medium   • PONV (postoperative nausea and vomiting) 04/14/2020   • Malignant neoplasm of lower third of esophagus ([facility]) 03/16/2020   • Coronary artery disease involving native coronary artery of native heart without angina pectoris 07/09/2018   • Central apnea 07/09/2018   • BMI 37.0-37.9, adult 07/09/2018   • Central sleep apnea 06/28/2017   • Hypoxemia 12/14/2016   • Hypoxia 10/23/2015   • COPD (chronic obstructive pulmonary disease) ([facility]) 07/24/2015   • Hypothyroid 07/24/2015   • Thrombocytopenia ([facility]) 07/24/2015   • Systolic heart failure secondary to coronary artery disease ([facility]) 07/24/2015   • Diastolic heart failure ([facility]) 07/24/2015   • Cervical disc disease 10/13/2014   • Coronary artery disease due to calcified coronary lesion 02/13/2014   • Essential hypertension, benign 02/13/2014   • Dyslipidemia 02/13/2014       Family History   Problem Relation Age of Onset   • Heart Attack Father    • Cancer Mother        He  has a past medical history of Acute MI, inferolateral wall, initial episode of care ([facility]), Anesthesia, Arthritis (03/05/2020), ASTHMA, Breath shortness, Bronchitis (2014), CAD (coronary artery disease) (2/13/2014), Cancer ([facility]) (03/05/2020), Chronic airway obstruction, not elsewhere classified, Congestive heart failure ([facility]), EMPHYSEMA, Essential hypertension, benign (2/13/2014), High cholesterol, History of chronic cough, HLD (hyperlipidemia) (2/13/2014), Hypertension (2013), ADRYAN on CPAP (6/28/2017), PAF (paroxysmal atrial fibrillation) ([facility]) (7/26/2015), Pneumonia, PONV (postoperative nausea and vomiting), Psychiatric problem, Unspecified disorder of thyroid (2010), and Unspecified hemorrhagic conditions.  He  has a past surgical history that  "includes cervical disk and fusion anterior (10/13/2014); multiple coronary artery bypass endo vein harvest (N/A, 7/23/2015); colonoscopy (2009); dental surgery (1971); hernia repair (1956); tonsillectomy (1958); pr endoscopic us exam, esoph (3/6/2020); gastroscopy-endo (3/6/2020); egd with asp/bx (3/6/2020); pr lap,gastrostomy,w/o tube constr (N/A, 4/14/2020); other cardiac surgery (2008); other orthopedic surgery (2012); and esophagectomy thorascopic (N/A, 6/22/2020).       Objective:   Ht 1.88 m (6' 2\")   Wt 99.8 kg (220 lb)   BMI 28.25 kg/m²     Physical Exam:  Constitutional: Alert, no distress, well-groomed.  Skin: No rashes in visible areas.  Eye: Round. Conjunctiva clear, lids normal. No icterus.   ENMT: Lips pink without lesions, good dentition, moist mucous membranes. Phonation normal.  Neck: No masses, no thyromegaly. Moves freely without pain.  Respiratory: Unlabored respiratory effort, no cough or audible wheeze  Psych: Alert and oriented x3, normal affect and mood.       Assessment and Plan:   The following treatment plan was discussed:     Patient is doing well with no clinical or radiologic evidence of disease recurrence. I reviewed with him the NCCN guidelines and recommend follow-up every 3 to 6 months and I will alternate with Dr. Bates so we will see him back in 6 months in September.  Dr. Bates has been ordering CT scans for him and I will review them in September.  I have refilled his omeprazole 40 mg twice daily for reflux related to his gastrectomy.      Follow-up: 6 months         "

## 2021-03-15 NOTE — NON-PROVIDER
"Patient was seen today in clinic with Dr. Arias for follow up.  Vitals signs and weight were obtained and pain assessment was completed.  Allergies and medications were reviewed with the patient.  Toxicities of treatment assessed.     Vitals/Pain:  Vitals:    03/15/21 1452   Weight: 99.8 kg (220 lb)   Height: 1.88 m (6' 2\")   Pain Score: No pain        Allergies:   Morphine    Current Medications:  Current Outpatient Medications   Medication Sig Dispense Refill   • clopidogrel (PLAVIX) 75 MG Tab      • traZODone (DESYREL) 100 MG Tab Take 1 tablet by mouth every bedtime. 30 tablet 1   • temazepam (RESTORIL) 15 MG Cap Take 1 capsule by mouth at bedtime as needed for Sleep for up to 30 days. 30 capsule 0   • SPIRIVA HANDIHALER 18 MCG Cap INHALE THE CONTENTS OF 1  CAPSULE BY MOUTH VIA  HANDIHALER DAILY 90 Cap 3   • fluticasone-salmeterol (ADVAIR) 500-50 MCG/DOSE AEROSOL POWDER, BREATH ACTIVATED Inhale 1 Puff by mouth 2 times a day. 3 Each 3   • doxazosin (CARDURA) 4 MG Tab      • furosemide (LASIX) 20 MG Tab      • HYDROcodone-acetaminophen 2.5-108 mg/5mL (HYCET) 7.5-325 MG/15ML solution      • levoFLOXacin (LEVAQUIN) 750 MG tablet      • metoprolol SR (TOPROL XL) 25 MG TABLET SR 24 HR      • albuterol (PROVENTIL) 2.5mg/3ml Nebu Soln solution for nebulization 3 mL by Nebulization route every four hours as needed for Shortness of Breath. 120 mL 11   • methylPREDNISolone (MEDROL DOSEPAK) 4 MG Tablet Therapy Pack Take as directed. 21 Tab 0   • azithromycin (ZITHROMAX) 250 MG Tab Take 2 tablets on day 1, then take 1 tablet a day for 4 days. 6 Tab 0   • atorvastatin (LIPITOR) 80 MG tablet Take 80 mg by mouth every evening.     • Albuterol Sulfate (PROAIR RESPICLICK) 108 (90 Base) MCG/ACT AEROSOL POWDER, BREATH ACTIVATED Inhale 2 Puffs by mouth as needed (for shortness of breath, wheezing.). every 4-6 hours as needed. 1 Each 3   • acetaminophen (TYLENOL) 500 MG Tab Take 500-1,000 mg by mouth every 6 hours as needed.     • " potassium chloride (MICRO-K) 10 MEQ capsule      • BIOTIN PO Take 1,000 mcg by mouth.     • ezetimibe (ZETIA) 10 MG Tab Take 10 mg by mouth every day.     • benazepril (LOTENSIN) 20 MG Tab Take 1 Tab by mouth every day. 90 Tab 3   • nitroglycerin (NITROSTAT) 0.4 MG SL Tab Place 1 Tab under tongue as needed for Chest Pain (Place 1 tablet under the tongue every 5 minutes up to 3 doses as needed for chest pain). 75 Tab 3   • omeprazole (PRILOSEC) 40 MG delayed-release capsule Take 40 mg by mouth every day.     • levothyroxine (SYNTHROID) 75 MCG Tab Take 75 mcg by mouth Every morning on an empty stomach.     • Melatonin 10 MG Tab Take 10 mg by mouth every bedtime.     • Cranberry 500 MG Cap Take 1 Cap by mouth every day.     • Zinc 50 MG Cap Take 50 mg by mouth every day.     • diphenhydrAMINE (BENADRYL) 25 MG Tab Take 25 mg by mouth every bedtime.     • aspirin EC (ECOTRIN) 81 MG TBEC Take 81 mg by mouth every day.     • tamsulosin (FLOMAX) 0.4 MG capsule Take 0.4 mg by mouth ONE-HALF HOUR AFTER BREAKFAST.     • BRILINTA 90 MG Tab tablet Take 90 mg by mouth 2 Times a Day. Indications: Acute Coronary Syndrome       No current facility-administered medications for this encounter.           Binta Scales, Med Ass't

## 2021-04-05 ENCOUNTER — HOSPITAL ENCOUNTER (OUTPATIENT)
Dept: HOSPITAL 8 - CFH | Age: 66
Discharge: HOME | End: 2021-04-05
Attending: INTERNAL MEDICINE
Payer: COMMERCIAL

## 2021-04-05 DIAGNOSIS — I25.10: ICD-10-CM

## 2021-04-05 DIAGNOSIS — I08.0: Primary | ICD-10-CM

## 2021-04-05 PROCEDURE — 93306 TTE W/DOPPLER COMPLETE: CPT

## 2021-04-15 DIAGNOSIS — G47.33 OSA (OBSTRUCTIVE SLEEP APNEA): ICD-10-CM

## 2021-04-15 DIAGNOSIS — F51.04 CHRONIC INSOMNIA: ICD-10-CM

## 2021-04-15 RX ORDER — TRAZODONE HYDROCHLORIDE 100 MG/1
100 TABLET ORAL
Qty: 30 TABLET | Refills: 1 | Status: SHIPPED | OUTPATIENT
Start: 2021-04-15 | End: 2021-06-16 | Stop reason: DRUGHIGH

## 2021-04-15 NOTE — TELEPHONE ENCOUNTER
Have we ever prescribed this med? Yes.  If yes, what date? 03/08/2021    Last OV: 03/08/2021 - Dr. Marquez     Next OV: 06/16/2021 - Dr. Marquez    DX: Chronic Insomnia     Medications: Trazodone     Pharmacy did not receive refill on last fill

## 2021-06-01 ENCOUNTER — HOSPITAL ENCOUNTER (OUTPATIENT)
Dept: RADIOLOGY | Facility: MEDICAL CENTER | Age: 66
End: 2021-06-01
Attending: INTERNAL MEDICINE
Payer: MEDICARE

## 2021-06-01 DIAGNOSIS — C15.5 MALIGNANT NEOPLASM OF ABDOMINAL ESOPHAGUS (HCC): ICD-10-CM

## 2021-06-01 PROCEDURE — A9552 F18 FDG: HCPCS

## 2021-06-16 ENCOUNTER — OFFICE VISIT (OUTPATIENT)
Dept: SLEEP MEDICINE | Facility: MEDICAL CENTER | Age: 66
End: 2021-06-16
Payer: MEDICARE

## 2021-06-16 VITALS
RESPIRATION RATE: 16 BRPM | SYSTOLIC BLOOD PRESSURE: 122 MMHG | DIASTOLIC BLOOD PRESSURE: 72 MMHG | OXYGEN SATURATION: 93 % | HEIGHT: 74 IN | HEART RATE: 63 BPM | WEIGHT: 219 LBS | BODY MASS INDEX: 28.11 KG/M2

## 2021-06-16 DIAGNOSIS — R06.81 CENTRAL APNEA: ICD-10-CM

## 2021-06-16 DIAGNOSIS — G47.00 INSOMNIA, UNSPECIFIED TYPE: ICD-10-CM

## 2021-06-16 PROCEDURE — 99214 OFFICE O/P EST MOD 30 MIN: CPT | Performed by: FAMILY MEDICINE

## 2021-06-16 RX ORDER — TRAZODONE HYDROCHLORIDE 150 MG/1
150 TABLET ORAL NIGHTLY PRN
Qty: 30 TABLET | Refills: 3 | Status: SHIPPED | OUTPATIENT
Start: 2021-06-16 | End: 2021-06-16

## 2021-06-16 RX ORDER — TRAZODONE HYDROCHLORIDE 150 MG/1
150 TABLET ORAL NIGHTLY PRN
Qty: 90 TABLET | Refills: 0 | Status: SHIPPED | OUTPATIENT
Start: 2021-06-16 | End: 2021-09-13

## 2021-06-16 ASSESSMENT — FIBROSIS 4 INDEX: FIB4 SCORE: 2.16

## 2021-06-16 NOTE — PROGRESS NOTES
[facility] Follow Up Note     Date: 6/16/2021 / Time: 1:17 PM    Patient ID:   Name:             Norberto White   YOB: 1955  Age:                 66 y.o.  male   MRN:               [id number]      Thank you for requesting a sleep medicine consultation on Norberto White at the sleep center. He presents today with the chief complaints of CSA and insomnia follow up.     HISTORY OF PRESENT ILLNESS:       Pt is currently on Auto SV with EPAP min/max 7/15, PS min/max 3/13. He goes to sleep around 10-11 pm and wakes up around 7 am. He is getting about 7 hrs of sleep on a good night. The bad nights are about 2-3 per week. His last visit it was recommended to taper off off temazepam and start trazodone instead as a sleep aid. He has been on trazodone 100 mg since then.  As per patient 70% of the nights are better than before in regards of insomnia.  Denies any side effects from trazodone overall, he does finds his sleep refreshing if he gets enough hours of sleep.He does not take regular naps. He denies any symptoms of RLS, narcolepsy or any symptoms to suggest parasomnias such as nightmares, sleep walking or acting out of dreams.He is using ASV most days of the week. Pt reports 8 hrs of average nightly use of ASV. Pt denies snoring, gasping,choking.Pt also denies significant mask leak that is interfering with sleep. The 30 day compliance was downloaded which shows adequate compliance with more that 4 hr usage about 96%. The AHI is has improved to 3.6/hr. The mask leak is abnormal. The symptoms of excessive daytime, snoring and gasping has improved with the ASV.         SLEEP HISTORY   Polysomnogram was completed on 12/11/2016 and indicated evidence of mild sleep apnea with an AHI of 14.7 with a low 02 of 84%. He had an incomplete titration to CPAP with development on centrals with airway pressurization. He had 54% centals during the titration portion. He is currently on Auto SV  with EPAP min/max 7/15, PS min/max 3/13.      REVIEW OF SYSTEMS:       Constitutional: Denies fevers, Denies weight changes  Eyes: Denies changes in vision, no eye pain  Ears/Nose/Throat/Mouth: Denies nasal congestion or sore throat   Cardiovascular: Denies chest pain or palpitations   Respiratory: Denies shortness of breath , Denies cough  Gastrointestinal/Hepatic: Denies abdominal pain, nausea, vomiting, diarrhea, constipation or GI bleeding   Genitourinary: Deniesdysuria or frequency  Musculoskeletal/Rheum: Denies  joint pain and swelling   Skin/Breast: Denies rash,   Neurological: Denies headache, confusion, memory loss or focal weakness/parasthesias  Psychiatric: denies mood disorder   Sleep: denies snoring     Comprehensive review of systems form is reviewed with the patient and is attached in the EMR.     PMH:  has a past medical history of Acute MI, inferolateral wall, initial episode of care ([facility]), Anesthesia, Arthritis (03/05/2020), ASTHMA, Breath shortness, Bronchitis (2014), CAD (coronary artery disease) (2/13/2014), Cancer ([facility]) (03/05/2020), Chronic airway obstruction, not elsewhere classified, Congestive heart failure ([facility]), EMPHYSEMA, Essential hypertension, benign (2/13/2014), High cholesterol, History of chronic cough, HLD (hyperlipidemia) (2/13/2014), Hypertension (2013), ADRYAN on CPAP (6/28/2017), PAF (paroxysmal atrial fibrillation) ([facility]) (7/26/2015), Pneumonia, PONV (postoperative nausea and vomiting), Psychiatric problem, Unspecified disorder of thyroid (2010), and Unspecified hemorrhagic conditions.  MEDS:   Current Outpatient Medications:   •  traZODone (DESYREL) 100 MG Tab, Take 1 tablet by mouth at bedtime., Disp: 30 tablet, Rfl: 1  •  clopidogrel (PLAVIX) 75 MG Tab, , Disp: , Rfl:   •  omeprazole (PRILOSEC) 40 MG delayed-release capsule, Take 1 capsule by mouth 2 times a day for 270 days., Disp: 180 capsule, Rfl: 2  •  SPIRIVA HANDIHALER 18 MCG Cap, INHALE THE CONTENTS OF 1  CAPSULE BY  MOUTH VIA  HANDIHALER DAILY, Disp: 90 Cap, Rfl: 3  •  fluticasone-salmeterol (ADVAIR) 500-50 MCG/DOSE AEROSOL POWDER, BREATH ACTIVATED, Inhale 1 Puff by mouth 2 times a day., Disp: 3 Each, Rfl: 3  •  levoFLOXacin (LEVAQUIN) 750 MG tablet, , Disp: , Rfl:   •  metoprolol SR (TOPROL XL) 25 MG TABLET SR 24 HR, , Disp: , Rfl:   •  albuterol (PROVENTIL) 2.5mg/3ml Nebu Soln solution for nebulization, 3 mL by Nebulization route every four hours as needed for Shortness of Breath., Disp: 120 mL, Rfl: 11  •  atorvastatin (LIPITOR) 80 MG tablet, Take 80 mg by mouth every evening., Disp: , Rfl:   •  Albuterol Sulfate (PROAIR RESPICLICK) 108 (90 Base) MCG/ACT AEROSOL POWDER, BREATH ACTIVATED, Inhale 2 Puffs by mouth as needed (for shortness of breath, wheezing.). every 4-6 hours as needed., Disp: 1 Each, Rfl: 3  •  acetaminophen (TYLENOL) 500 MG Tab, Take 500-1,000 mg by mouth every 6 hours as needed., Disp: , Rfl:   •  potassium chloride (MICRO-K) 10 MEQ capsule, , Disp: , Rfl:   •  ezetimibe (ZETIA) 10 MG Tab, Take 10 mg by mouth every day., Disp: , Rfl:   •  benazepril (LOTENSIN) 20 MG Tab, Take 1 Tab by mouth every day., Disp: 90 Tab, Rfl: 3  •  nitroglycerin (NITROSTAT) 0.4 MG SL Tab, Place 1 Tab under tongue as needed for Chest Pain (Place 1 tablet under the tongue every 5 minutes up to 3 doses as needed for chest pain)., Disp: 75 Tab, Rfl: 3  •  Melatonin 10 MG Tab, Take 10 mg by mouth every bedtime., Disp: , Rfl:   •  Cranberry 500 MG Cap, Take 1 Cap by mouth every day., Disp: , Rfl:   •  Zinc 50 MG Cap, Take 50 mg by mouth every day., Disp: , Rfl:   •  aspirin EC (ECOTRIN) 81 MG TBEC, Take 81 mg by mouth every day., Disp: , Rfl:   •  tamsulosin (FLOMAX) 0.4 MG capsule, Take 0.4 mg by mouth ONE-HALF HOUR AFTER BREAKFAST., Disp: , Rfl:   •  doxazosin (CARDURA) 4 MG Tab, , Disp: , Rfl:   •  furosemide (LASIX) 20 MG Tab, , Disp: , Rfl:   •  HYDROcodone-acetaminophen 2.5-108 mg/5mL (HYCET) 7.5-325 MG/15ML solution, , Disp: ,  Rfl:   •  methylPREDNISolone (MEDROL DOSEPAK) 4 MG Tablet Therapy Pack, Take as directed. (Patient not taking: Reported on 6/16/2021), Disp: 21 Tab, Rfl: 0  •  azithromycin (ZITHROMAX) 250 MG Tab, Take 2 tablets on day 1, then take 1 tablet a day for 4 days. (Patient not taking: Reported on 6/16/2021), Disp: 6 Tab, Rfl: 0  •  BIOTIN PO, Take 1,000 mcg by mouth. (Patient not taking: Reported on 6/16/2021), Disp: , Rfl:   •  BRILINTA 90 MG Tab tablet, Take 90 mg by mouth 2 Times a Day. Indications: Acute Coronary Syndrome, Disp: , Rfl:   •  levothyroxine (SYNTHROID) 75 MCG Tab, Take 75 mcg by mouth Every morning on an empty stomach. (Patient not taking: Reported on 6/16/2021), Disp: , Rfl:   •  diphenhydrAMINE (BENADRYL) 25 MG Tab, Take 25 mg by mouth every bedtime. (Patient not taking: Reported on 6/16/2021), Disp: , Rfl:   ALLERGIES:   Allergies   Allergen Reactions   • Morphine Anxiety and Unspecified     Pt becomes irritable  Agitation; hot flashes.     SURGHX:   Past Surgical History:   Procedure Laterality Date   • ESOPHAGECTOMY THORASCOPIC N/A 6/22/2020    Procedure: ESOPHAGECTOMY, THORACOSCOPIC - NODE DISSECTION;  Surgeon: John H Ganser, M.D.;  Location: [facility];  Service: General   • PB LAP,GASTROSTOMY,W/O TUBE CONSTR N/A 4/14/2020    Procedure: CREATION, GASTROSTOMY, LAPAROSCOPIC-JEJUNOSTOMY PLACEMENT;  Surgeon: John H Ganser, M.D.;  Location: [facility];  Service: General   • PB ENDOSCOPIC US EXAM, ESOPH  3/6/2020    Procedure: EGD, WITH ENDOSCOPIC US - UPPER LINEAR RADIAL;  Surgeon: Gavino Cardenas M.D.;  Location: [facility];  Service: Gastroenterology   • GASTROSCOPY-ENDO  3/6/2020    Procedure: GASTROSCOPY - W/ESOPHAGEAL STENTING AND BIOPSIES;  Surgeon: Gavino Cardenas M.D.;  Location: [facility];  Service: Gastroenterology   • EGD WITH ASP/BX  3/6/2020    Procedure: EGD, WITH ASPIRATION BIOPSY - FNA;  Surgeon: Gavino Cardenas M.D.;  Location:  "SURGERY [facility];  Service: Gastroenterology   • MULTIPLE CORONARY ARTERY BYPASS ENDO VEIN HARVEST N/A 7/23/2015    Procedure: MULTIPLE CORONARY ARTERY BYPASS ENDO VEIN HARVEST;  Surgeon: Deuce Tam M.D.;  Location: [facility];  Service:    • CERVICAL DISK AND FUSION ANTERIOR  10/13/2014    Performed by Moises Kerns M.D. at SURGERY [facility]   • OTHER ORTHOPEDIC SURGERY  2012 1975-present 5 surgeries- right knee x3, left knee x2    • COLONOSCOPY  2009   • OTHER CARDIAC SURGERY  2008    cardiac stents   • DENTAL SURGERY  1971    wisdom teeth   • TONSILLECTOMY  1958   • HERNIA REPAIR  1955    'born with hernia'     SOCHX:  reports that he quit smoking about 26 years ago. His smoking use included cigarettes and cigars. He has a 40.00 pack-year smoking history. He has never used smokeless tobacco. He reports previous alcohol use. He reports current drug use. Drug: Inhaled..  FH:   Family History   Problem Relation Age of Onset   • Heart Attack Father    • Cancer Mother          Physical Exam:  Vitals/ General Appearance:   Weight/BMI: Body mass index is 28.12 kg/m².  /72 (BP Location: Right arm, Patient Position: Sitting, BP Cuff Size: Adult)   Pulse 63   Resp 16   Ht 1.88 m (6' 2\")   Wt 99.3 kg (219 lb)   SpO2 93%   Vitals:    06/16/21 1309   BP: 122/72   BP Location: Right arm   Patient Position: Sitting   BP Cuff Size: Adult   Pulse: 63   Resp: 16   SpO2: 93%   Weight: 99.3 kg (219 lb)   Height: 1.88 m (6' 2\")       Pt. is alert and oriented to time, place and person. Cooperative and in no apparent distress.       Constitutional: Alert, no distress, well-groomed.  Skin: No rashes in visible areas.  Eye: Round. Conjunctiva clear, lids normal. No icterus.   ENMT: Lips pink without lesions, good dentition, moist mucous membranes. Phonation normal.  Neck: No masses, no thyromegaly. Moves freely without pain.  CV: Pulse as reported by patient  Respiratory: Unlabored " respiratory effort, no cough or audible wheeze  Psych: Alert and oriented x3, normal affect and mood.     ASSESSMENT AND PLAN     1. Sleep Apnea  The pathophysiology of sleep anea and the increased risk of cardiovascular morbidity from untreated sleep apnea is discussed in detail with the patient.    He is urged to avoid supine sleep, weight gain and alcoholic beverages since all of these can worsen sleep apnea. He is cautioned against drowsy driving. If He feels sleepy while driving, He must pull over for a break/nap, rather than persist on the road, in the interest of He own safety and that of others on the road.   Plan   - Continue Auto SV with EPAP min/max 7/15, PS min/max 3/13   - compliance download was reviewed and discussed with the pt   - compliance was reinforced     2.  Chronic insomnia  - stimulus control and sleep hygiene reinforced   - increasing trazodone to 150 mg.  Side effects were discussed in detail.  Recommended drug holiday to prevent tolerance and dependency      Regarding treatment of other past medical problems and general health maintenance,  He is urged to follow up with PCP.

## 2021-06-16 NOTE — PATIENT INSTRUCTIONS
Trazodone tablets  What is this medicine?  TRAZODONE (TRAZ oh done) is used to treat depression.  This medicine may be used for other purposes; ask your health care provider or pharmacist if you have questions.  COMMON BRAND NAME(S): Mansoor  What should I tell my health care provider before I take this medicine?  They need to know if you have any of these conditions:  · attempted suicide or thinking about it  · bipolar disorder  · bleeding problems  · glaucoma  · heart disease, or previous heart attack  · irregular heart beat  · kidney or liver disease  · low levels of sodium in the blood  · an unusual or allergic reaction to trazodone, other medicines, foods, dyes or preservatives  · pregnant or trying to get pregnant  · breast-feeding  How should I use this medicine?  Take this medicine by mouth with a glass of water. Follow the directions on the prescription label. Take this medicine shortly after a meal or a light snack. Take your medicine at regular intervals. Do not take your medicine more often than directed. Do not stop taking this medicine suddenly except upon the advice of your doctor. Stopping this medicine too quickly may cause serious side effects or your condition may worsen.  A special MedGuide will be given to you by the pharmacist with each prescription and refill. Be sure to read this information carefully each time.  Talk to your pediatrician regarding the use of this medicine in children. Special care may be needed.  Overdosage: If you think you have taken too much of this medicine contact a poison control center or emergency room at once.  NOTE: This medicine is only for you. Do not share this medicine with others.  What if I miss a dose?  If you miss a dose, take it as soon as you can. If it is almost time for your next dose, take only that dose. Do not take double or extra doses.  What may interact with this medicine?  Do not take this medicine with any of the following  medications:  · certain medicines for fungal infections like fluconazole, itraconazole, ketoconazole, posaconazole, voriconazole  · cisapride  · dronedarone  · linezolid  · MAOIs like Carbex, Eldepryl, Marplan, Nardil, and Parnate  · mesoridazine  · methylene blue (injected into a vein)  · pimozide  · saquinavir  · thioridazine  This medicine may also interact with the following medications:  · alcohol  · antiviral medicines for HIV or AIDS  · aspirin and aspirin-like medicines  · barbiturates like phenobarbital  · certain medicines for blood pressure, heart disease, irregular heart beat  · certain medicines for depression, anxiety, or psychotic disturbances  · certain medicines for migraine headache like almotriptan, eletriptan, frovatriptan, naratriptan, rizatriptan, sumatriptan, zolmitriptan  · certain medicines for seizures like carbamazepine and phenytoin  · certain medicines for sleep  · certain medicines that treat or prevent blood clots like dalteparin, enoxaparin, warfarin  · digoxin  · fentanyl  · lithium  · NSAIDS, medicines for pain and inflammation, like ibuprofen or naproxen  · other medicines that prolong the QT interval (cause an abnormal heart rhythm) like dofetilide  · rasagiline  · supplements like Bhavesh's wort, kava kava, valerian  · tramadol  · tryptophan  This list may not describe all possible interactions. Give your health care provider a list of all the medicines, herbs, non-prescription drugs, or dietary supplements you use. Also tell them if you smoke, drink alcohol, or use illegal drugs. Some items may interact with your medicine.  What should I watch for while using this medicine?  Tell your doctor if your symptoms do not get better or if they get worse. Visit your doctor or health care professional for regular checks on your progress. Because it may take several weeks to see the full effects of this medicine, it is important to continue your treatment as prescribed by your  doctor.  Patients and their families should watch out for new or worsening thoughts of suicide or depression. Also watch out for sudden changes in feelings such as feeling anxious, agitated, panicky, irritable, hostile, aggressive, impulsive, severely restless, overly excited and hyperactive, or not being able to sleep. If this happens, especially at the beginning of treatment or after a change in dose, call your health care professional.  You may get drowsy or dizzy. Do not drive, use machinery, or do anything that needs mental alertness until you know how this medicine affects you. Do not stand or sit up quickly, especially if you are an older patient. This reduces the risk of dizzy or fainting spells. Alcohol may interfere with the effect of this medicine. Avoid alcoholic drinks.  This medicine may cause dry eyes and blurred vision. If you wear contact lenses you may feel some discomfort. Lubricating drops may help. See your eye doctor if the problem does not go away or is severe.  Your mouth may get dry. Chewing sugarless gum, sucking hard candy and drinking plenty of water may help. Contact your doctor if the problem does not go away or is severe.  What side effects may I notice from receiving this medicine?  Side effects that you should report to your doctor or health care professional as soon as possible:  · allergic reactions like skin rash, itching or hives, swelling of the face, lips, or tongue  · elevated mood, decreased need for sleep, racing thoughts, impulsive behavior  · confusion  · fast, irregular heartbeat  · feeling faint or lightheaded, falls  · feeling agitated, angry, or irritable  · loss of balance or coordination  · painful or prolonged erections  · restlessness, pacing, inability to keep still  · suicidal thoughts or other mood changes  · tremors  · trouble sleeping  · seizures  · unusual bleeding or bruising  Side effects that usually do not require medical attention (report to your doctor  or health care professional if they continue or are bothersome):  · change in sex drive or performance  · change in appetite or weight  · constipation  · headache  · muscle aches or pains  · nausea  This list may not describe all possible side effects. Call your doctor for medical advice about side effects. You may report side effects to FDA at 4-051-MNM-9780.  Where should I keep my medicine?  Keep out of the reach of children.  Store at room temperature between 15 and 30 degrees C (59 to 86 degrees F). Protect from light. Keep container tightly closed. Throw away any unused medicine after the expiration date.  NOTE: This sheet is a summary. It may not cover all possible information. If you have questions about this medicine, talk to your doctor, pharmacist, or health care provider.  © 2020 [employer] (2019-12-10 11:46:46)

## 2021-09-12 DIAGNOSIS — G47.00 INSOMNIA, UNSPECIFIED TYPE: ICD-10-CM

## 2021-09-13 DIAGNOSIS — G47.00 INSOMNIA, UNSPECIFIED TYPE: ICD-10-CM

## 2021-09-13 RX ORDER — TRAZODONE HYDROCHLORIDE 150 MG/1
150 TABLET ORAL NIGHTLY PRN
Qty: 30 TABLET | Refills: 0 | Status: SHIPPED | OUTPATIENT
Start: 2021-09-13 | End: 2021-09-14

## 2021-09-13 NOTE — TELEPHONE ENCOUNTER
Have we ever prescribed this med? Yes.  If yes, what date? 06/16/21    Last OV: 06/16/21 with Dr Marquez    Next OV: 10/21/21 with Dr Tracy    DX:Insomnia    Medications:   Requested Prescriptions     Pending Prescriptions Disp Refills   • traZODone (DESYREL) 150 MG Tab [Pharmacy Med Name: TRAZODONE 150MG (HUNDRED-FIFTY) TAB] 90 Tablet 0     Sig: TAKE 1 TABLET BY MOUTH AT BEDTIME AS NEEDED FOR SLEEP

## 2021-09-14 RX ORDER — TRAZODONE HYDROCHLORIDE 150 MG/1
150 TABLET ORAL NIGHTLY PRN
Qty: 90 TABLET | Refills: 0 | Status: SHIPPED | OUTPATIENT
Start: 2021-09-14 | End: 2022-01-03 | Stop reason: SDUPTHER

## 2021-09-14 NOTE — TELEPHONE ENCOUNTER
Pt requesting a 90 day supply  Have we ever prescribed this med? Yes.  If yes, what date? 09/13/21    Last OV: 06/16/21 with Dr Marquez    Next OV: 10/21/21 with Dr Marquez    DX: Insomnia, unspecified type (G47.00)    Medications:   Requested Prescriptions     Pending Prescriptions Disp Refills   • traZODone (DESYREL) 150 MG Tab [Pharmacy Med Name: TRAZODONE 150MG (HUNDRED-FIFTY) TAB] 90 Tablet      Sig: TAKE 1 TABLET BY MOUTH AT BEDTIME AS NEEDED FOR SLEEP

## 2021-09-16 ENCOUNTER — APPOINTMENT (OUTPATIENT)
Dept: RADIATION ONCOLOGY | Facility: MEDICAL CENTER | Age: 66
End: 2021-09-16
Attending: RADIOLOGY
Payer: MEDICARE

## 2021-10-21 ENCOUNTER — OFFICE VISIT (OUTPATIENT)
Dept: SLEEP MEDICINE | Facility: MEDICAL CENTER | Age: 66
End: 2021-10-21
Payer: MEDICARE

## 2021-10-21 VITALS
SYSTOLIC BLOOD PRESSURE: 124 MMHG | HEART RATE: 59 BPM | BODY MASS INDEX: 28.88 KG/M2 | OXYGEN SATURATION: 93 % | DIASTOLIC BLOOD PRESSURE: 74 MMHG | WEIGHT: 225 LBS | HEIGHT: 74 IN | RESPIRATION RATE: 16 BRPM

## 2021-10-21 DIAGNOSIS — R06.81 CENTRAL APNEA: ICD-10-CM

## 2021-10-21 DIAGNOSIS — Z23 NEED FOR VACCINATION: Primary | ICD-10-CM

## 2021-10-21 DIAGNOSIS — J44.9 CHRONIC OBSTRUCTIVE PULMONARY DISEASE, UNSPECIFIED COPD TYPE (HCC): Chronic | ICD-10-CM

## 2021-10-21 PROCEDURE — 99213 OFFICE O/P EST LOW 20 MIN: CPT | Mod: 25 | Performed by: FAMILY MEDICINE

## 2021-10-21 PROCEDURE — G0008 ADMIN INFLUENZA VIRUS VAC: HCPCS | Performed by: FAMILY MEDICINE

## 2021-10-21 PROCEDURE — 90662 IIV NO PRSV INCREASED AG IM: CPT | Performed by: FAMILY MEDICINE

## 2021-10-21 ASSESSMENT — FIBROSIS 4 INDEX: FIB4 SCORE: 2.16

## 2021-10-21 NOTE — PROGRESS NOTES
[facility] Follow Up Note     Date: 10/21/2021 / Time: 11:36 AM    Patient ID:   Name:             Norberto White   YOB: 1955  Age:                 66 y.o.  male   MRN:               [id number]      Thank you for requesting a sleep medicine consultation on Norberto White at the sleep center. He presents today with the chief complaints of sleep apnea follow up.     HISTORY OF PRESENT ILLNESS:       Pt is currently on Auto SV with EPAP min/max 7/15, PS min/max 3/13.  He is getting about 8 hrs of sleep on a good night and about 7 hr of sleep on a bad night.He uses trazodone 150 mg qhs. Overall,  He does finds his sleep refreshing. He denies any symptoms of RLS, narcolepsy or any symptoms to suggest parasomnias such as nightmares, sleep walking or acting out of dreams.  He is using ASV most days of the week. Pt reports 9 hrs of average nightly use of ASV. Pt denies snoring, gasping,choking.Pt also denies significant mask leak that is interfering with sleep. The 30 day compliance was downloaded which shows adequate compliance with more that 4 hr usage about 100%. The AHI is has improved to 2.3/hr. The mask leak is normal. The symptoms of excessive daytime, snoring and gasping has improved with the therapy.  Currently on Respironics ASV.  Respironics recall was discussed in detail with the patient. as per patient he has worsening of cough and sinus congestion in last couple months.  He does use so clean machine.    SLEEP HISTORY   Polysomnogram was completed on 12/11/2016 and indicated evidence of mild sleep apnea with an AHI of 14.7 with a low 02 of 84%. He had an incomplete titration to CPAP with development on centrals with airway pressurization. He had 54% centals during the titration portion. He is currently on Auto SV with EPAP min/max 7/15, PS min/max 3/13.      REVIEW OF SYSTEMS:       Constitutional: Denies fevers, Denies weight changes  Eyes: Denies changes in vision, no  eye pain  Ears/Nose/Throat/Mouth: Denies nasal congestion or sore throat   Cardiovascular: Denies chest pain or palpitations   Respiratory: Denies shortness of breath , Denies cough  Gastrointestinal/Hepatic: Denies abdominal pain, nausea, vomiting, diarrhea, constipation or GI bleeding   Genitourinary: Deniesdysuria or frequency  Musculoskeletal/Rheum: Denies  joint pain and swelling   Skin/Breast: Denies rash,   Neurological: Denies headache, confusion, memory loss or focal weakness/parasthesias  Psychiatric: denies mood disorder   Sleep: denies snoring     Comprehensive review of systems form is reviewed with the patient and is attached in the EMR.     PMH:  has a past medical history of Acute MI, inferolateral wall, initial episode of care ([facility]), Anesthesia, Arthritis (03/05/2020), ASTHMA, Breath shortness, Bronchitis (2014), CAD (coronary artery disease) (2/13/2014), Cancer ([facility]) (03/05/2020), Chronic airway obstruction, not elsewhere classified, Congestive heart failure ([facility]), EMPHYSEMA, Essential hypertension, benign (2/13/2014), High cholesterol, History of chronic cough, HLD (hyperlipidemia) (2/13/2014), Hypertension (2013), ADRYAN on CPAP (6/28/2017), PAF (paroxysmal atrial fibrillation) ([facility]) (7/26/2015), Pneumonia, PONV (postoperative nausea and vomiting), Psychiatric problem, Unspecified disorder of thyroid (2010), and Unspecified hemorrhagic conditions.  MEDS:   Current Outpatient Medications:   •  traZODone (DESYREL) 150 MG Tab, TAKE 1 TABLET BY MOUTH AT BEDTIME AS NEEDED FOR SLEEP, Disp: 90 Tablet, Rfl: 0  •  clopidogrel (PLAVIX) 75 MG Tab, , Disp: , Rfl:   •  omeprazole (PRILOSEC) 40 MG delayed-release capsule, Take 1 capsule by mouth 2 times a day for 270 days., Disp: 180 capsule, Rfl: 2  •  SPIRIVA HANDIHALER 18 MCG Cap, INHALE THE CONTENTS OF 1  CAPSULE BY MOUTH VIA  HANDIHALER DAILY, Disp: 90 Cap, Rfl: 3  •  fluticasone-salmeterol (ADVAIR) 500-50 MCG/DOSE AEROSOL POWDER, BREATH ACTIVATED, Inhale 1  Puff by mouth 2 times a day., Disp: 3 Each, Rfl: 3  •  doxazosin (CARDURA) 4 MG Tab, , Disp: , Rfl:   •  levoFLOXacin (LEVAQUIN) 750 MG tablet, , Disp: , Rfl:   •  metoprolol SR (TOPROL XL) 25 MG TABLET SR 24 HR, , Disp: , Rfl:   •  albuterol (PROVENTIL) 2.5mg/3ml Nebu Soln solution for nebulization, 3 mL by Nebulization route every four hours as needed for Shortness of Breath., Disp: 120 mL, Rfl: 11  •  atorvastatin (LIPITOR) 80 MG tablet, Take 80 mg by mouth every evening., Disp: , Rfl:   •  Albuterol Sulfate (PROAIR RESPICLICK) 108 (90 Base) MCG/ACT AEROSOL POWDER, BREATH ACTIVATED, Inhale 2 Puffs by mouth as needed (for shortness of breath, wheezing.). every 4-6 hours as needed., Disp: 1 Each, Rfl: 3  •  acetaminophen (TYLENOL) 500 MG Tab, Take 500-1,000 mg by mouth every 6 hours as needed., Disp: , Rfl:   •  potassium chloride (MICRO-K) 10 MEQ capsule, , Disp: , Rfl:   •  ezetimibe (ZETIA) 10 MG Tab, Take 10 mg by mouth every day., Disp: , Rfl:   •  BRILINTA 90 MG Tab tablet, Take 90 mg by mouth 2 Times a Day. Indications: Acute Coronary Syndrome, Disp: , Rfl:   •  benazepril (LOTENSIN) 20 MG Tab, Take 1 Tab by mouth every day., Disp: 90 Tab, Rfl: 3  •  nitroglycerin (NITROSTAT) 0.4 MG SL Tab, Place 1 Tab under tongue as needed for Chest Pain (Place 1 tablet under the tongue every 5 minutes up to 3 doses as needed for chest pain)., Disp: 75 Tab, Rfl: 3  •  Melatonin 10 MG Tab, Take 10 mg by mouth every bedtime., Disp: , Rfl:   •  Cranberry 500 MG Cap, Take 1 Cap by mouth every day., Disp: , Rfl:   •  Zinc 50 MG Cap, Take 50 mg by mouth every day., Disp: , Rfl:   •  aspirin EC (ECOTRIN) 81 MG TBEC, Take 81 mg by mouth every day., Disp: , Rfl:   •  tamsulosin (FLOMAX) 0.4 MG capsule, Take 0.4 mg by mouth ONE-HALF HOUR AFTER BREAKFAST., Disp: , Rfl:   •  furosemide (LASIX) 20 MG Tab, , Disp: , Rfl:   •  HYDROcodone-acetaminophen 2.5-108 mg/5mL (HYCET) 7.5-325 MG/15ML solution, , Disp: , Rfl:   •   methylPREDNISolone (MEDROL DOSEPAK) 4 MG Tablet Therapy Pack, Take as directed. (Patient not taking: Reported on 6/16/2021), Disp: 21 Tab, Rfl: 0  •  azithromycin (ZITHROMAX) 250 MG Tab, Take 2 tablets on day 1, then take 1 tablet a day for 4 days. (Patient not taking: Reported on 6/16/2021), Disp: 6 Tab, Rfl: 0  •  BIOTIN PO, Take 1,000 mcg by mouth. (Patient not taking: Reported on 6/16/2021), Disp: , Rfl:   •  levothyroxine (SYNTHROID) 75 MCG Tab, Take 75 mcg by mouth Every morning on an empty stomach. (Patient not taking: Reported on 6/16/2021), Disp: , Rfl:   •  diphenhydrAMINE (BENADRYL) 25 MG Tab, Take 25 mg by mouth every bedtime. (Patient not taking: Reported on 6/16/2021), Disp: , Rfl:   ALLERGIES:   Allergies   Allergen Reactions   • Morphine Anxiety and Unspecified     Pt becomes irritable  Agitation; hot flashes.     SURGHX:   Past Surgical History:   Procedure Laterality Date   • ESOPHAGECTOMY THORASCOPIC N/A 6/22/2020    Procedure: ESOPHAGECTOMY, THORACOSCOPIC - NODE DISSECTION;  Surgeon: John H Ganser, M.D.;  Location: [facility];  Service: General   • PB LAP,GASTROSTOMY,W/O TUBE CONSTR N/A 4/14/2020    Procedure: CREATION, GASTROSTOMY, LAPAROSCOPIC-JEJUNOSTOMY PLACEMENT;  Surgeon: John H Ganser, M.D.;  Location: [facility];  Service: General   • PB ENDOSCOPIC US EXAM, ESOPH  3/6/2020    Procedure: EGD, WITH ENDOSCOPIC US - UPPER LINEAR RADIAL;  Surgeon: Gavino Cardenas M.D.;  Location: [facility];  Service: Gastroenterology   • GASTROSCOPY-ENDO  3/6/2020    Procedure: GASTROSCOPY - W/ESOPHAGEAL STENTING AND BIOPSIES;  Surgeon: Gavino Cardenas M.D.;  Location: [facility];  Service: Gastroenterology   • EGD WITH ASP/BX  3/6/2020    Procedure: EGD, WITH ASPIRATION BIOPSY - FNA;  Surgeon: Gavino Cardenas M.D.;  Location: [facility];  Service: Gastroenterology   • MULTIPLE CORONARY ARTERY BYPASS ENDO VEIN HARVEST N/A 7/23/2015     "Procedure: MULTIPLE CORONARY ARTERY BYPASS ENDO VEIN HARVEST;  Surgeon: Deuce Tam M.D.;  Location: SURGERY [facility];  Service:    • CERVICAL DISK AND FUSION ANTERIOR  10/13/2014    Performed by Moises Kerns M.D. at SURGERY [facility]   • OTHER ORTHOPEDIC SURGERY  2012 1975-present 5 surgeries- right knee x3, left knee x2    • COLONOSCOPY  2009   • OTHER CARDIAC SURGERY  2008    cardiac stents   • DENTAL SURGERY  1971    wisdom teeth   • TONSILLECTOMY  1958   • HERNIA REPAIR  1955    'born with hernia'     SOCHX:  reports that he quit smoking about 26 years ago. His smoking use included cigarettes and cigars. He has a 40.00 pack-year smoking history. He has never used smokeless tobacco. He reports previous alcohol use. He reports current drug use. Drug: Inhaled..  FH:   Family History   Problem Relation Age of Onset   • Heart Attack Father    • Cancer Mother          Physical Exam:  Vitals/ General Appearance:   Weight/BMI: Body mass index is 28.89 kg/m².  Resp 16   Ht 1.88 m (6' 2\")   Wt 102 kg (225 lb)   Vitals:    10/21/21 1133   Resp: 16   Weight: 102 kg (225 lb)   Height: 1.88 m (6' 2\")       Pt. is alert and oriented to time, place and person. Cooperative and in no apparent distress.       Constitutional: Alert, no distress, well-groomed.  Skin: No rashes in visible areas.  Eye: Round. Conjunctiva clear, lids normal. No icterus.   ENMT: Lips pink without lesions, good dentition, moist mucous membranes. Phonation normal.  Neck: No masses, no thyromegaly. Moves freely without pain.  CV: Pulse as reported by patient  Respiratory: Unlabored respiratory effort, no cough or audible wheeze  Psych: Alert and oriented x3, normal affect and mood.     ASSESSMENT AND PLAN     1. Sleep Apnea      He is urged to avoid supine sleep, weight gain and alcoholic beverages since all of these can worsen sleep apnea. He is cautioned against drowsy driving. If He feels sleepy while driving, He must pull over " for a break/nap, rather than persist on the road, in the interest of He own safety and that of others on the road.   Plan   -. After informed discussion ResMed ASV  EPAP min/max 7/15, PS min/max 3/13 is ordered today.  Strongly recommended against using so clean or any ozone based CPAP .   - supplies are refilled    - compliance download was reviewed and discussed with the pt   - compliance was reinforced     2.  Chronic insomnia  - Stimulus control and sleep hygiene reinforced   - Continue  trazodone to 150 mg.  Side effects were discussed in detail.  Recommended drug holiday to prevent tolerance and dependency    The recent recall from RespirLantronixs is due to disintegration of the polyurethane foam was discussed in detail. he denies any symptoms mentioned in the recall.  Risk and benefits of using versus withholding therapy was reviewed with him in detail.  his comorbid conditions including COPD, cardiomyopathy and CHF however since he has been having worsening of cough and sinus issues there was recommended to withhold the therapy.  Alternative therapy including surgeries and mandibular advancement device was discussed in detail.  Patient was also recommended to register CPAP machine online for possible repair/replacement.

## 2021-10-21 NOTE — PATIENT INSTRUCTIONS
"CPAP and BPAP Information  CPAP and BPAP are methods of helping a person breathe with the use of air pressure. CPAP stands for \"continuous positive airway pressure.\" BPAP stands for \"bi-level positive airway pressure.\" In both methods, air is blown through your nose or mouth and into your air passages to help you breathe well.  CPAP and BPAP use different amounts of pressure to blow air. With CPAP, the amount of pressure stays the same while you breathe in and out. With BPAP, the amount of pressure is increased when you breathe in (inhale) so that you can take larger breaths. Your health care provider will recommend whether CPAP or BPAP would be more helpful for you.  Why are CPAP and BPAP treatments used?  CPAP or BPAP can be helpful if you have:  · Sleep apnea.  · Chronic obstructive pulmonary disease (COPD).  · Heart failure.  · Medical conditions that weaken the muscles of the chest including muscular dystrophy, or neurological diseases such as amyotrophic lateral sclerosis (ALS).  · Other problems that cause breathing to be weak, abnormal, or difficult.  CPAP is most commonly used for obstructive sleep apnea (ADRYAN) to keep the airways from collapsing when the muscles relax during sleep.  How is CPAP or BPAP administered?  Both CPAP and BPAP are provided by a small machine with a flexible plastic tube that attaches to a plastic mask. You wear the mask. Air is blown through the mask into your nose or mouth. The amount of pressure that is used to blow the air can be adjusted on the machine. Your health care provider will determine the pressure setting that should be used based on your individual needs.  When should CPAP or BPAP be used?  In most cases, the mask only needs to be worn during sleep. Generally, the mask needs to be worn throughout the night and during any daytime naps. People with certain medical conditions may also need to wear the mask at other times when they are awake. Follow instructions from your " health care provider about when to use the machine.  What are some tips for using the mask?    · Because the mask needs to be snug, some people feel trapped or closed-in (claustrophobic) when first using the mask. If you feel this way, you may need to get used to the mask. One way to do this is by holding the mask loosely over your nose or mouth and then gradually applying the mask more snugly. You can also gradually increase the amount of time that you use the mask.  · Masks are available in various types and sizes. Some fit over your mouth and nose while others fit over just your nose. If your mask does not fit well, talk with your health care provider about getting a different one.  · If you are using a mask that fits over your nose and you tend to breathe through your mouth, a chin strap may be applied to help keep your mouth closed.  · The CPAP and BPAP machines have alarms that may sound if the mask comes off or develops a leak.  · If you have trouble with the mask, it is very important that you talk with your health care provider about finding a way to make the mask easier to tolerate. Do not stop using the mask. Stopping the use of the mask could have a negative impact on your health.  What are some tips for using the machine?  · Place your CPAP or BPAP machine on a secure table or stand near an electrical outlet.  · Know where the on/off switch is located on the machine.  · Follow instructions from your health care provider about how to set the pressure on your machine and when you should use it.  · Do not eat or drink while the CPAP or BPAP machine is on. Food or fluids could get pushed into your lungs by the pressure of the CPAP or BPAP.  · Do not smoke. Tobacco smoke residue can damage the machine.  · For home use, CPAP and BPAP machines can be rented or purchased through home health care companies. Many different brands of machines are available. Renting a machine before purchasing may help you find out  which particular machine works well for you.  · Keep the CPAP or BPAP machine and attachments clean. Ask your health care provider for specific instructions.  Get help right away if:  · You have redness or open areas around your nose or mouth where the mask fits.  · You have trouble using the CPAP or BPAP machine.  · You cannot tolerate wearing the CPAP or BPAP mask.  · You have pain, discomfort, and bloating in your abdomen.  Summary  · CPAP and BPAP are methods of helping a person breathe with the use of air pressure.  · Both CPAP and BPAP are provided by a small machine with a flexible plastic tube that attaches to a plastic mask.  · If you have trouble with the mask, it is very important that you talk with your health care provider about finding a way to make the mask easier to tolerate.  This information is not intended to replace advice given to you by your health care provider. Make sure you discuss any questions you have with your health care provider.  Document Released: 09/15/2005 Document Revised: 04/08/2020 Document Reviewed: 11/06/2017  Elsevier Patient Education © 2020 [employer]

## 2021-10-27 DIAGNOSIS — J44.9 CHRONIC OBSTRUCTIVE PULMONARY DISEASE, UNSPECIFIED COPD TYPE (HCC): ICD-10-CM

## 2021-10-28 RX ORDER — TIOTROPIUM BROMIDE 18 UG/1
CAPSULE ORAL; RESPIRATORY (INHALATION)
Qty: 90 CAPSULE | Refills: 0 | Status: SHIPPED | OUTPATIENT
Start: 2021-10-28 | End: 2022-04-22

## 2021-10-28 NOTE — TELEPHONE ENCOUNTER
Have we ever prescribed this med? Yes.  If yes, what date? 12/04/2020    Last OV: 12/09/2020 - Juliet Martin    Next OV: was due back 4/2021    DX: COPD    Medications: Spiriva

## 2021-11-16 DIAGNOSIS — C15.5 MALIGNANT NEOPLASM OF LOWER THIRD OF ESOPHAGUS (HCC): ICD-10-CM

## 2021-11-24 RX ORDER — OMEPRAZOLE 40 MG/1
CAPSULE, DELAYED RELEASE ORAL
Qty: 180 CAPSULE | Refills: 3 | Status: SHIPPED | OUTPATIENT
Start: 2021-11-24 | End: 2022-10-24

## 2021-12-01 ENCOUNTER — PATIENT MESSAGE (OUTPATIENT)
Dept: SLEEP MEDICINE | Facility: MEDICAL CENTER | Age: 66
End: 2021-12-01

## 2021-12-01 DIAGNOSIS — J44.9 CHRONIC OBSTRUCTIVE PULMONARY DISEASE, UNSPECIFIED COPD TYPE (HCC): ICD-10-CM

## 2021-12-02 NOTE — TELEPHONE ENCOUNTER
From: Norberto White  To: Physician Assistant Juliet Martin  Sent: 12/1/2021 12:21 PM PST  Subject: Advair refill    Good afternoon. My pharmacy [facility] is stating they sent you a refill request for my Advair, and have not heard anything back. I am almost out, and leaving for the winter soon and need a refill done please. Thank you for your attention in this matter.     Norberto White

## 2021-12-02 NOTE — PATIENT COMMUNICATION
Have we ever prescribed this med? Yes.  If yes, what date? 10/20/20    Last OV: 12/09/20    Next OV: none scheduled     DX: COPD    Medications: advair

## 2021-12-03 ENCOUNTER — HOSPITAL ENCOUNTER (OUTPATIENT)
Dept: RADIOLOGY | Facility: MEDICAL CENTER | Age: 66
End: 2021-12-03
Attending: INTERNAL MEDICINE
Payer: COMMERCIAL

## 2021-12-06 ENCOUNTER — HOSPITAL ENCOUNTER (OUTPATIENT)
Dept: RADIATION ONCOLOGY | Facility: MEDICAL CENTER | Age: 66
End: 2021-12-31
Attending: RADIOLOGY
Payer: COMMERCIAL

## 2022-01-02 ENCOUNTER — PATIENT MESSAGE (OUTPATIENT)
Dept: SLEEP MEDICINE | Facility: MEDICAL CENTER | Age: 67
End: 2022-01-02

## 2022-01-02 DIAGNOSIS — G47.00 INSOMNIA, UNSPECIFIED TYPE: ICD-10-CM

## 2022-01-03 RX ORDER — TRAZODONE HYDROCHLORIDE 150 MG/1
150 TABLET ORAL NIGHTLY PRN
Qty: 90 TABLET | Refills: 0 | Status: SHIPPED | OUTPATIENT
Start: 2022-01-03 | End: 2022-03-30 | Stop reason: SDUPTHER

## 2022-01-03 NOTE — TELEPHONE ENCOUNTER
From: Norberto White  To: Physician Eddie Marquez  Sent: 1/2/2022 6:38 PM PST  Subject: Trazadone refill    Happy New Year Dr. Marquez! I am down in [place] for the next three months. I am in need of a refill of my Trazadone sent to the [facility] in [place], [place]. It's address is [address] [place] [address] , [place], [place], [address]. Phone number is [phone].     Thank you,  Norberto White

## 2022-03-30 ENCOUNTER — PATIENT MESSAGE (OUTPATIENT)
Dept: SLEEP MEDICINE | Facility: MEDICAL CENTER | Age: 67
End: 2022-03-30
Payer: COMMERCIAL

## 2022-03-30 DIAGNOSIS — G47.00 INSOMNIA, UNSPECIFIED TYPE: ICD-10-CM

## 2022-03-30 NOTE — PATIENT COMMUNICATION
Please review pending rx    Have we ever prescribed this med? Yes. If yes, what date? 1/3/2020    Last OV: 10/21/2021 - DR. COELHO      Next OV: NONE       DX: Insomnia, unspecified type (G47.00)      Medications: traZODone (DESYREL) 150 MG Tab

## 2022-03-30 NOTE — TELEPHONE ENCOUNTER
From: Norberto White  To: Physician Eddie Marquez  Sent: 3/30/2022 4:30 PM PDT  Subject: Trazadone refill    Hello there. I am back home in [place] and am in need of a refill of my Trazadone sent to my local [facility] here in [place], [place], please.    Thank you,  Norberto White

## 2022-03-31 RX ORDER — TRAZODONE HYDROCHLORIDE 150 MG/1
150 TABLET ORAL NIGHTLY PRN
Qty: 90 TABLET | Refills: 0 | Status: SHIPPED | OUTPATIENT
Start: 2022-03-31 | End: 2022-05-23 | Stop reason: SDUPTHER

## 2022-04-20 DIAGNOSIS — J44.9 CHRONIC OBSTRUCTIVE PULMONARY DISEASE, UNSPECIFIED COPD TYPE (HCC): ICD-10-CM

## 2022-04-22 RX ORDER — TIOTROPIUM BROMIDE 18 UG/1
CAPSULE ORAL; RESPIRATORY (INHALATION)
Qty: 90 CAPSULE | Refills: 3 | Status: SHIPPED | OUTPATIENT
Start: 2022-04-22

## 2022-04-22 NOTE — TELEPHONE ENCOUNTER
Have we ever prescribed this med? Yes.  If yes, what date? 10/28/21    Last OV: 10/21/21    Next OV: 5/23/22    DX: COPD    Medications: SPIRIVA

## 2022-05-23 ENCOUNTER — OFFICE VISIT (OUTPATIENT)
Dept: SLEEP MEDICINE | Facility: MEDICAL CENTER | Age: 67
End: 2022-05-23
Payer: MEDICARE

## 2022-05-23 VITALS
RESPIRATION RATE: 16 BRPM | SYSTOLIC BLOOD PRESSURE: 122 MMHG | HEART RATE: 73 BPM | WEIGHT: 231 LBS | BODY MASS INDEX: 29.65 KG/M2 | DIASTOLIC BLOOD PRESSURE: 72 MMHG | HEIGHT: 74 IN | OXYGEN SATURATION: 93 %

## 2022-05-23 DIAGNOSIS — G47.00 INSOMNIA, UNSPECIFIED TYPE: ICD-10-CM

## 2022-05-23 DIAGNOSIS — G47.33 OSA (OBSTRUCTIVE SLEEP APNEA): ICD-10-CM

## 2022-05-23 PROCEDURE — 99214 OFFICE O/P EST MOD 30 MIN: CPT | Performed by: FAMILY MEDICINE

## 2022-05-23 RX ORDER — TRAZODONE HYDROCHLORIDE 150 MG/1
150 TABLET ORAL NIGHTLY PRN
Qty: 90 TABLET | Refills: 1 | Status: SHIPPED | OUTPATIENT
Start: 2022-05-23 | End: 2022-12-27

## 2022-05-23 ASSESSMENT — PATIENT HEALTH QUESTIONNAIRE - PHQ9: CLINICAL INTERPRETATION OF PHQ2 SCORE: 0

## 2022-05-23 ASSESSMENT — FIBROSIS 4 INDEX: FIB4 SCORE: 2.2

## 2022-05-23 NOTE — PROGRESS NOTES
[facility] Follow Up Note     Date: 5/23/2022 / Time: 2:44 PM    Patient ID:   Name:             Norberto White   YOB: 1955  Age:                 67 y.o.  male   MRN:               [id number]      Thank you for requesting a sleep medicine consultation on Norberto White at the sleep center. He presents today with the chief complaints of ADRYAN follow up.     HISTORY OF PRESENT ILLNESS:       Pt is currently on ResMed ASV  EPAP min/max 7/15, PS min/max 3/13 with O2 bleed in at 2 LPM. He has been on continuous supplemental O2 since his COVID PNA in 01/2022. He goes to sleep around 10:30 pm and wakes up around 8 am. He is getting about 8 hrs of sleep on a good night and  Overall,  He does  finds his sleep refreshing.He does not take regular naps.  He denies any symptoms of RLS, narcolepsy or any symptoms to suggest parasomnias such as nightmares, sleep walking or acting out of dreams.He is using ASV most days of the week. Pt reports 9 hrs of average nightly use of CPAP. Pt denies snoring, gasping,choking.Pt also denies significant mask leak that is interfering with sleep. The 30 day compliance was downloaded which shows adequate compliance with more that 4 hr usage about 100%. The AHI is has improved to 0/hr. The mask leak is normaln. The symptoms of ADRYAN are well controlled on the therapy      SLEEP HISTORY   Polysomnogram was completed on 12/11/2016 and indicated evidence of mild sleep apnea with an AHI of 14.7 with a low 02 of 84%. He had an incomplete titration to CPAP with development on centrals with airway pressurization. He had 54% centals during the titration portion. He is currently on Auto SV with EPAP min/max 7/15, PS min/max 3/13.         REVIEW OF SYSTEMS:       Constitutional: Denies fevers, Denies weight changes  Eyes: Denies changes in vision, no eye pain  Ears/Nose/Throat/Mouth: Denies nasal congestion or sore throat   Cardiovascular: Denies chest pain or  palpitations   Respiratory: Denies shortness of breath , Denies cough  Gastrointestinal/Hepatic: Denies abdominal pain, nausea  Musculoskeletal/Rheum: Denies  joint pain and swelling   Skin/Breast: Denies rash,   Neurological: Denies headache, confusion, memory loss or focal weakness/parasthesias  Psychiatric: denies mood disorder   Sleep: denies snoring     Comprehensive review of systems form is reviewed with the patient and is attached in the EMR.     PMH:  has a past medical history of Acute MI, inferolateral wall, initial episode of care ([facility]), Anesthesia, Arthritis (03/05/2020), ASTHMA, Breath shortness, Bronchitis (2014), CAD (coronary artery disease) (2/13/2014), Cancer ([facility]) (03/05/2020), Chronic airway obstruction, not elsewhere classified, Congestive heart failure ([facility]), EMPHYSEMA, Essential hypertension, benign (2/13/2014), High cholesterol, History of chronic cough, HLD (hyperlipidemia) (2/13/2014), Hypertension (2013), ADRYAN on CPAP (6/28/2017), PAF (paroxysmal atrial fibrillation) ([facility]) (7/26/2015), Pneumonia, PONV (postoperative nausea and vomiting), Psychiatric problem, Unspecified disorder of thyroid (2010), and Unspecified hemorrhagic conditions.  MEDS:   Current Outpatient Medications:   •  omeprazole (PRILOSEC) 40 MG delayed-release capsule, TAKE 1 CAPSULE BY MOUTH  TWICE DAILY, Disp: 180 Capsule, Rfl: 3  •  clopidogrel (PLAVIX) 75 MG Tab, , Disp: , Rfl:   •  metoprolol SR (TOPROL XL) 25 MG TABLET SR 24 HR, , Disp: , Rfl:   •  atorvastatin (LIPITOR) 80 MG tablet, Take 80 mg by mouth every evening., Disp: , Rfl:   •  potassium chloride (MICRO-K) 10 MEQ capsule, , Disp: , Rfl:   •  ezetimibe (ZETIA) 10 MG Tab, Take 10 mg by mouth every day., Disp: , Rfl:   •  levothyroxine (SYNTHROID) 75 MCG Tab, Take 75 mcg by mouth every morning on an empty stomach., Disp: , Rfl:   •  Cranberry 500 MG Cap, Take 1 Cap by mouth every day., Disp: , Rfl:   •  Zinc 50 MG Cap, Take 50 mg by mouth every day., Disp: ,  Rfl:   •  aspirin EC (ECOTRIN) 81 MG TBEC, Take 81 mg by mouth every day., Disp: , Rfl:   •  tamsulosin (FLOMAX) 0.4 MG capsule, Take 0.4 mg by mouth ONE-HALF HOUR AFTER BREAKFAST., Disp: , Rfl:   •  SPIRIVA HANDIHALER 18 MCG Cap, INHALE THE CONTENTS OF 1  CAPSULE BY MOUTH VIA  HANDIHALER DAILY, Disp: 90 Capsule, Rfl: 3  •  traZODone (DESYREL) 150 MG Tab, Take 1 Tablet by mouth at bedtime as needed for Sleep for up to 90 days., Disp: 90 Tablet, Rfl: 0  •  fluticasone-salmeterol (ADVAIR) 500-50 MCG/DOSE AEROSOL POWDER, BREATH ACTIVATED, Inhale 1 Puff 2 times a day., Disp: 3 Each, Rfl: 3  •  doxazosin (CARDURA) 4 MG Tab, , Disp: , Rfl:   •  furosemide (LASIX) 20 MG Tab, , Disp: , Rfl:   •  HYDROcodone-acetaminophen 2.5-108 mg/5mL (HYCET) 7.5-325 MG/15ML solution, , Disp: , Rfl:   •  levoFLOXacin (LEVAQUIN) 750 MG tablet, , Disp: , Rfl:   •  albuterol (PROVENTIL) 2.5mg/3ml Nebu Soln solution for nebulization, 3 mL by Nebulization route every four hours as needed for Shortness of Breath., Disp: 120 mL, Rfl: 11  •  methylPREDNISolone (MEDROL DOSEPAK) 4 MG Tablet Therapy Pack, Take as directed. (Patient not taking: Reported on 6/16/2021), Disp: 21 Tab, Rfl: 0  •  azithromycin (ZITHROMAX) 250 MG Tab, Take 2 tablets on day 1, then take 1 tablet a day for 4 days. (Patient not taking: Reported on 6/16/2021), Disp: 6 Tab, Rfl: 0  •  Albuterol Sulfate (PROAIR RESPICLICK) 108 (90 Base) MCG/ACT AEROSOL POWDER, BREATH ACTIVATED, Inhale 2 Puffs by mouth as needed (for shortness of breath, wheezing.). every 4-6 hours as needed., Disp: 1 Each, Rfl: 3  •  acetaminophen (TYLENOL) 500 MG Tab, Take 500-1,000 mg by mouth every 6 hours as needed., Disp: , Rfl:   •  BIOTIN PO, Take 1,000 mcg by mouth. (Patient not taking: Reported on 6/16/2021), Disp: , Rfl:   •  BRILINTA 90 MG Tab tablet, Take 90 mg by mouth 2 Times a Day. Indications: Acute Coronary Syndrome, Disp: , Rfl:   •  benazepril (LOTENSIN) 20 MG Tab, Take 1 Tab by mouth every  day., Disp: 90 Tab, Rfl: 3  •  nitroglycerin (NITROSTAT) 0.4 MG SL Tab, Place 1 Tab under tongue as needed for Chest Pain (Place 1 tablet under the tongue every 5 minutes up to 3 doses as needed for chest pain)., Disp: 75 Tab, Rfl: 3  •  Melatonin 10 MG Tab, Take 10 mg by mouth every bedtime., Disp: , Rfl:   •  diphenhydrAMINE (BENADRYL) 25 MG Tab, Take 25 mg by mouth every bedtime. (Patient not taking: Reported on 6/16/2021), Disp: , Rfl:   ALLERGIES:   Allergies   Allergen Reactions   • Morphine Anxiety and Unspecified     Pt becomes irritable  Agitation; hot flashes.     SURGHX:   Past Surgical History:   Procedure Laterality Date   • ESOPHAGECTOMY THORASCOPIC N/A 6/22/2020    Procedure: ESOPHAGECTOMY, THORACOSCOPIC - NODE DISSECTION;  Surgeon: John H Ganser, M.D.;  Location: [facility];  Service: General   • NJ LAP,GASTROSTOMY,W/O TUBE CONSTR N/A 4/14/2020    Procedure: CREATION, GASTROSTOMY, LAPAROSCOPIC-JEJUNOSTOMY PLACEMENT;  Surgeon: John H Ganser, M.D.;  Location: [facility];  Service: General   • NJ ENDOSCOPIC US EXAM, ESOPH  3/6/2020    Procedure: EGD, WITH ENDOSCOPIC US - UPPER LINEAR RADIAL;  Surgeon: Gavino Cardenas M.D.;  Location: [facility];  Service: Gastroenterology   • GASTROSCOPY-ENDO  3/6/2020    Procedure: GASTROSCOPY - W/ESOPHAGEAL STENTING AND BIOPSIES;  Surgeon: Gavino Cardenas M.D.;  Location: [facility];  Service: Gastroenterology   • EGD WITH ASP/BX  3/6/2020    Procedure: EGD, WITH ASPIRATION BIOPSY - FNA;  Surgeon: Gavino Cardenas M.D.;  Location: [facility];  Service: Gastroenterology   • MULTIPLE CORONARY ARTERY BYPASS ENDO VEIN HARVEST N/A 7/23/2015    Procedure: MULTIPLE CORONARY ARTERY BYPASS ENDO VEIN HARVEST;  Surgeon: Deuce Tam M.D.;  Location: [facility];  Service:    • CERVICAL DISK AND FUSION ANTERIOR  10/13/2014    Performed by Moises Kerns M.D. at [facility]   • OTHER  "ORTHOPEDIC SURGERY  2012 1975-present 5 surgeries- right knee x3, left knee x2    • COLONOSCOPY  2009   • OTHER CARDIAC SURGERY  2008    cardiac stents   • DENTAL SURGERY  1971    wisdom teeth   • TONSILLECTOMY  1958   • HERNIA REPAIR  1955    'born with hernia'     SOCHX:  reports that he quit smoking about 27 years ago. His smoking use included cigarettes and cigars. He has a 40.00 pack-year smoking history. He has never used smokeless tobacco. He reports previous alcohol use. He reports current drug use. Drug: Inhaled..  FH:   Family History   Problem Relation Age of Onset   • Heart Attack Father    • Cancer Mother          Physical Exam:  Vitals/ General Appearance:   Weight/BMI: Body mass index is 29.66 kg/m².  /72 (BP Location: Left arm, Patient Position: Sitting, BP Cuff Size: Adult)   Pulse 73   Resp 16   Ht 1.88 m (6' 2\")   Wt 105 kg (231 lb)   SpO2 93%   Vitals:    05/23/22 1435   BP: 122/72   BP Location: Left arm   Patient Position: Sitting   BP Cuff Size: Adult   Pulse: 73   Resp: 16   SpO2: 93%   Weight: 105 kg (231 lb)   Height: 1.88 m (6' 2\")       Pt. is alert and oriented to time, place and person. Cooperative and in no apparent distress.       Constitutional: Alert, no distress, well-groomed.  Skin: No rashes in visible areas.  Eye: Round. Conjunctiva clear, lids normal. No icterus.   ENMT: Lips pink without lesions, good dentition, moist mucous membranes. Phonation normal.  Neck: No masses, no thyromegaly. Moves freely without pain.  CV: Pulse as reported by patient  Respiratory: Unlabored respiratory effort, no cough or audible wheeze  Psych: Alert and oriented x3, normal affect and mood.     ASSESSMENT AND PLAN     1. Sleep Apnea .   He is urged to avoid supine sleep, weight gain and alcoholic beverages since all of these can worsen sleep apnea. He is cautioned against drowsy driving. If He feels sleepy while driving, He must pull over for a break/nap, rather than persist on the " road, in the interest of He own safety and that of others on the road.   Plan   - Continue ResMed ASV  EPAP min/max 7/15, PS min/max 3/13 with O2 bleed in at 2LPM   - OPO is ordered today   - Compliance download was reviewed and discussed with the pt   - Compliance was reinforced     2. Regarding treatment of other past medical problems and general health maintenance,  He is urged to follow up with PCP.

## 2022-06-06 ENCOUNTER — TELEPHONE (OUTPATIENT)
Dept: SCHEDULING | Facility: IMAGING CENTER | Age: 67
End: 2022-06-06
Payer: COMMERCIAL

## 2022-06-06 NOTE — TELEPHONE ENCOUNTER
Hello,      I received a call from Norberto, the pt, who stated that at his last appt with Dr. Marquez on 05-, he was told that he could get the Overnight Oximetry mailed to him or pick it up at [facility] in [place], [place] instead of coming into [facility]. Is someone able to look into this for the pt please and reach out to him about it please?    Best contact for pt:  PH:[phone]      Thank you,  Ashley MARTINEZ

## 2022-06-23 NOTE — TELEPHONE ENCOUNTER
LVM for the pt informing him that the order for the OPO was faxed to DME:  [facility] / ph [phone] / fx [phone] with all the needed information. I apologize to the pt for the delay and advise the pt to give me a call or send me a mychart message if he has any other concerns.

## 2022-10-07 DIAGNOSIS — J44.9 CHRONIC OBSTRUCTIVE PULMONARY DISEASE, UNSPECIFIED COPD TYPE (HCC): ICD-10-CM

## 2022-10-10 RX ORDER — FLUTICASONE PROPIONATE AND SALMETEROL 500; 50 UG/1; UG/1
POWDER RESPIRATORY (INHALATION)
Qty: 180 EACH | Refills: 3 | OUTPATIENT
Start: 2022-10-10

## 2022-10-10 NOTE — TELEPHONE ENCOUNTER
Have we ever prescribed this med? Yes.  If yes, what date? 12/02/2021    Last OV: 05/23/2022- Dr. Marquez     Next OV: No appointment on file-       DX: Chronic obstructive pulmonary disease, unspecified COPD type (HCC) (J44.9)    Medications:   Requested Prescriptions     Pending Prescriptions Disp Refills    WIXELA INHUB 500-50 MCG/ACT AEROSOL POWDER, BREATH ACTIVATED [Pharmacy Med Name: Wixela Inhub 500-50 MCG/ACT Inhalation Aerosol Powder Breath Activated] 180 Each 3     Sig: USE 1 INHALATION BY MOUTH  TWICE DAILY

## 2022-10-20 DIAGNOSIS — C15.5 MALIGNANT NEOPLASM OF LOWER THIRD OF ESOPHAGUS (HCC): ICD-10-CM

## 2022-10-24 RX ORDER — OMEPRAZOLE 40 MG/1
CAPSULE, DELAYED RELEASE ORAL
Qty: 180 CAPSULE | Refills: 3 | Status: SHIPPED | OUTPATIENT
Start: 2022-10-24 | End: 2023-09-25

## 2022-11-23 ENCOUNTER — APPOINTMENT (OUTPATIENT)
Dept: SLEEP MEDICINE | Facility: MEDICAL CENTER | Age: 67
End: 2022-11-23
Payer: MEDICARE

## 2022-12-24 DIAGNOSIS — G47.33 OSA (OBSTRUCTIVE SLEEP APNEA): ICD-10-CM

## 2022-12-24 DIAGNOSIS — G47.00 INSOMNIA, UNSPECIFIED TYPE: ICD-10-CM

## 2022-12-27 RX ORDER — TRAZODONE HYDROCHLORIDE 150 MG/1
TABLET ORAL
Qty: 90 TABLET | Refills: 1 | Status: SHIPPED | OUTPATIENT
Start: 2022-12-27 | End: 2023-03-20

## 2022-12-27 NOTE — TELEPHONE ENCOUNTER
Have we ever prescribed this med? Yes.  If yes, what date?  8/21/2022    Last OV: 5/23/2022 - DR. COELHO     Next OV:  NONE    DX: Insomnia, unspecified type (G47.00); ADRYAN (obstructive sleep apnea) (G47.33)    Medications: traZODone (DESYREL) 150 MG Tab

## 2022-12-27 NOTE — TELEPHONE ENCOUNTER
LVM for the pt informing him that we received a refill request for traZODone (DESYREL) 150 MG Tab and it was approve and sent to [id number] - [place], [place] . I advise the pt to give us a call back if there any issues or concerns regarding the rx.

## 2023-03-17 DIAGNOSIS — G47.00 INSOMNIA, UNSPECIFIED TYPE: ICD-10-CM

## 2023-03-17 DIAGNOSIS — G47.33 OSA (OBSTRUCTIVE SLEEP APNEA): ICD-10-CM

## 2023-03-17 NOTE — TELEPHONE ENCOUNTER
Have we ever prescribed this med? Yes.  If yes, what date? 05/23/22    Last OV: 05/23/22    Next OV: None    DX: Insomnia, unspecified type (G47.00); ADRYAN (obstructive sleep apnea) (G47.33)    Medications: traZODone (DESYREL) 150 MG Tab

## 2023-03-20 RX ORDER — TRAZODONE HYDROCHLORIDE 150 MG/1
TABLET ORAL
Qty: 90 TABLET | Refills: 1 | Status: SHIPPED | OUTPATIENT
Start: 2023-03-20 | End: 2023-09-27

## 2023-08-21 ENCOUNTER — TELEPHONE (OUTPATIENT)
Dept: SCHEDULING | Facility: IMAGING CENTER | Age: 68
End: 2023-08-21

## 2023-08-21 NOTE — TELEPHONE ENCOUNTER
Mario    Caller: Norberto White     Medication Name and Dosage: ADVAIR and he is asking for a Rescue Inhaler also.     Medication amount left: 0    Preferred Pharmacy: [facility]    Other questions (Topic): Pt made appt in Oct. He was last seen 12/2020 and is out of his medication. Please reach out to him     Callback Number (Will only call for issues): [phone] (home)       Thank you    Oneyda GUTIERREZ

## 2023-08-22 NOTE — TELEPHONE ENCOUNTER
"Patient was contacted and informed that he will need to be seen in office prior to rx renewal due to last office visit 12/2020 , informed to contact PCP for refills . Patient stated \"I'll consider that when I am dead\"     Patient has scheduled appointment on 10/19/2023 with EDILBERTO MONDRAGON   "

## 2023-09-23 DIAGNOSIS — C15.5 MALIGNANT NEOPLASM OF LOWER THIRD OF ESOPHAGUS (HCC): ICD-10-CM

## 2023-09-25 RX ORDER — OMEPRAZOLE 40 MG/1
CAPSULE, DELAYED RELEASE ORAL
Qty: 180 CAPSULE | Refills: 3 | Status: SHIPPED | OUTPATIENT
Start: 2023-09-25

## 2023-09-27 DIAGNOSIS — G47.33 OSA (OBSTRUCTIVE SLEEP APNEA): ICD-10-CM

## 2023-09-27 DIAGNOSIS — G47.00 INSOMNIA, UNSPECIFIED TYPE: ICD-10-CM

## 2023-09-27 RX ORDER — TRAZODONE HYDROCHLORIDE 150 MG/1
TABLET ORAL
Qty: 90 TABLET | Refills: 1 | Status: SHIPPED | OUTPATIENT
Start: 2023-09-27

## 2023-09-27 NOTE — TELEPHONE ENCOUNTER
Have we ever prescribed this med? Yes.  If yes, what date? 3/20/2023    Last OV: 5/23/2022- Dr. Marquez     Next OV: 10/19/2023 - Saul MONDRAGON     DX: Insomnia, unspecified type [G47.00]; ADRYAN (obstructive sleep apnea) [G47.33]    Medications: traZODone (DESYREL) 150 MG Tab

## 2023-10-19 ENCOUNTER — OFFICE VISIT (OUTPATIENT)
Dept: SLEEP MEDICINE | Facility: MEDICAL CENTER | Age: 68
End: 2023-10-19
Attending: NURSE PRACTITIONER
Payer: MEDICARE

## 2023-10-19 VITALS
SYSTOLIC BLOOD PRESSURE: 90 MMHG | BODY MASS INDEX: 25.7 KG/M2 | DIASTOLIC BLOOD PRESSURE: 80 MMHG | OXYGEN SATURATION: 91 % | RESPIRATION RATE: 16 BRPM | HEIGHT: 74 IN | HEART RATE: 70 BPM | WEIGHT: 200.3 LBS

## 2023-10-19 DIAGNOSIS — J44.9 CHRONIC OBSTRUCTIVE PULMONARY DISEASE, UNSPECIFIED COPD TYPE (HCC): ICD-10-CM

## 2023-10-19 PROCEDURE — 3074F SYST BP LT 130 MM HG: CPT | Performed by: NURSE PRACTITIONER

## 2023-10-19 PROCEDURE — 99212 OFFICE O/P EST SF 10 MIN: CPT | Performed by: NURSE PRACTITIONER

## 2023-10-19 PROCEDURE — 3079F DIAST BP 80-89 MM HG: CPT | Performed by: NURSE PRACTITIONER

## 2023-10-19 PROCEDURE — 99214 OFFICE O/P EST MOD 30 MIN: CPT | Performed by: NURSE PRACTITIONER

## 2023-10-19 RX ORDER — TAMSULOSIN HYDROCHLORIDE 0.4 MG/1
0.4 CAPSULE ORAL
COMMUNITY

## 2023-10-19 RX ORDER — FLUTICASONE FUROATE, UMECLIDINIUM BROMIDE AND VILANTEROL TRIFENATATE 100; 62.5; 25 UG/1; UG/1; UG/1
1 POWDER RESPIRATORY (INHALATION) DAILY
Qty: 2 EACH | Refills: 0 | COMMUNITY
Start: 2023-10-19 | End: 2023-11-02 | Stop reason: SDUPTHER

## 2023-10-19 RX ORDER — ALBUTEROL SULFATE 90 UG/1
2 AEROSOL, METERED RESPIRATORY (INHALATION) EVERY 4 HOURS PRN
Qty: 8.5 G | Refills: 5 | Status: SHIPPED | OUTPATIENT
Start: 2023-10-19

## 2023-10-19 RX ORDER — ALBUTEROL SULFATE 90 UG/1
AEROSOL, METERED RESPIRATORY (INHALATION)
COMMUNITY
Start: 2023-08-25 | End: 2023-10-19 | Stop reason: SDUPTHER

## 2023-10-19 RX ORDER — TRAZODONE HYDROCHLORIDE 50 MG/1
50 TABLET ORAL
COMMUNITY

## 2023-10-19 RX ORDER — ATORVASTATIN CALCIUM 80 MG/1
80 TABLET, FILM COATED ORAL DAILY
COMMUNITY
Start: 2023-08-29

## 2023-10-19 RX ORDER — CLOPIDOGREL BISULFATE 75 MG/1
75 TABLET ORAL DAILY
COMMUNITY
Start: 2023-09-11

## 2023-10-19 RX ORDER — ALBUTEROL SULFATE 2.5 MG/3ML
2.5 SOLUTION RESPIRATORY (INHALATION) EVERY 4 HOURS PRN
Qty: 120 ML | Refills: 11 | Status: SHIPPED | OUTPATIENT
Start: 2023-10-19

## 2023-10-19 RX ORDER — METOPROLOL SUCCINATE 25 MG/1
25 TABLET, EXTENDED RELEASE ORAL DAILY
COMMUNITY
Start: 2023-08-29

## 2023-10-19 ASSESSMENT — PATIENT HEALTH QUESTIONNAIRE - PHQ9: CLINICAL INTERPRETATION OF PHQ2 SCORE: 0

## 2023-10-20 NOTE — PROGRESS NOTES
Chief Complaint   Patient presents with    Follow-Up     LAST SEEN 12/09/2022 ELVIRA JAMISON  F/V COPD       HPI:  Norberto White is a 68 y.o. year old male here today for follow-up on complex sleep apnea and COPD.  Last seen in sleep clinic on 5/23/2022  He is currently using ASV with supplemental oxygen bleed in at 3 L/min.  He is using a fullface mask.  He denies any difficulty with mask fit or pressure, but does get sores across the bridge of his nose.  He averages 8 to 10 hours of sleep nightly and denies any difficulty falling or staying asleep.  His current settings are ASV auto with CPAP 7 to 15 cm/H2O and pressure support 3 to 13 cm/H2O.  Compliance shows 100% use with an average time of 9 hours and 26 minutes and resultant AHI of 0.0.  The company is [employer]    And also presents for COPD.  Last seen in pulmonary clinic on 9/20/2020.  He is a former smoker with a 40-pack-year history quit 1999.  Patient does have a history of esophageal cancer of the lower third of esophagus stage III for which he had an esophagectomy gastric pull-through in 2020.  Patient also has multiple MRIs with stents placed.  Other history includes diastolic heart failure, cardiomyopathy, and A-fib along with thoracentesis.    Currently, patient's breathing is stable.  He did have COVID in 2021.  He does have persistent morning cough with yellowish sputum, but no chest congestion.  He feels his energy levels are fair and denies any new or worsening dyspnea or shortness of breath.  Request medication refill.        ROS: As per HPI and otherwise negative if not stated.    Past Medical History:   Diagnosis Date    Acute MI, inferolateral wall, initial episode of care (HCC)     X5 since 2007 last one 8/2019, Dr. Andrade at [facility]    Anesthesia     PONV    Arthritis 03/05/2020    Joints    ASTHMA     inhaler daily    Breath shortness     Bronchitis 2014    CAD (coronary artery disease) 2/13/2014    Bare-metal stent to the circumflex in  December 2013. Prior stent to the LAD diagonal had mild in-stent restenosis. 30% disease is noted in the right coronary artery.    Cancer (HCC) 03/05/2020    Esophagus CURRENT    Chronic airway obstruction, not elsewhere classified     Congestive heart failure (HCC)     EMPHYSEMA     COPD    Essential hypertension, benign 2/13/2014    High cholesterol     History of chronic cough     HLD (hyperlipidemia) 2/13/2014    Hypertension 2013    states well controlled on meds    ADRYAN on CPAP 6/28/2017    bipap    PAF (paroxysmal atrial fibrillation) (HCC) 7/26/2015    fixed with cardioversion, Dr Das follows    Pneumonia     2014    PONV (postoperative nausea and vomiting)     Psychiatric problem     anxiety    Unspecified disorder of thyroid 2010    on synthroid    Unspecified hemorrhagic conditions     bruises easily related to brilinta       Past Surgical History:   Procedure Laterality Date    ESOPHAGECTOMY THORASCOPIC N/A 6/22/2020    Procedure: ESOPHAGECTOMY, THORACOSCOPIC - NODE DISSECTION;  Surgeon: John H Ganser, M.D.;  Location: [facility];  Service: General    MS LAP,GASTROSTOMY,W/O TUBE CONSTR N/A 4/14/2020    Procedure: CREATION, GASTROSTOMY, LAPAROSCOPIC-JEJUNOSTOMY PLACEMENT;  Surgeon: John H Ganser, M.D.;  Location: [facility];  Service: General    MS ENDOSCOPIC US EXAM, ESOPH  3/6/2020    Procedure: EGD, WITH ENDOSCOPIC US - UPPER LINEAR RADIAL;  Surgeon: Gavino Cardenas M.D.;  Location: [facility];  Service: Gastroenterology    GASTROSCOPY-ENDO  3/6/2020    Procedure: GASTROSCOPY - W/ESOPHAGEAL STENTING AND BIOPSIES;  Surgeon: Gavino Cardenas M.D.;  Location: [facility];  Service: Gastroenterology    EGD WITH ASP/BX  3/6/2020    Procedure: EGD, WITH ASPIRATION BIOPSY - FNA;  Surgeon: Gavino Cardenas M.D.;  Location: [facility];  Service: Gastroenterology    MULTIPLE CORONARY ARTERY BYPASS ENDO VEIN HARVEST N/A 7/23/2015    Procedure:  "MULTIPLE CORONARY ARTERY BYPASS ENDO VEIN HARVEST;  Surgeon: Deuce Tam M.D.;  Location: SURGERY [facility];  Service:     CERVICAL DISK AND FUSION ANTERIOR  10/13/2014    Performed by Moises Kerns M.D. at SURGERY [facility]    OTHER ORTHOPEDIC SURGERY  2012 1975-present 5 surgeries- right knee x3, left knee x2     COLONOSCOPY  2009    OTHER CARDIAC SURGERY  2008    cardiac stents    DENTAL SURGERY  1971    wisdom teeth    TONSILLECTOMY  1958    HERNIA REPAIR  1956    'born with hernia'       Family History   Problem Relation Age of Onset    Heart Attack Father     Cancer Mother        Allergies as of 10/19/2023 - Reviewed 10/19/2023   Allergen Reaction Noted    Morphine Anxiety and Unspecified 10/14/2014        Vitals:  BP 90/80 (BP Location: Left arm, Patient Position: Sitting, BP Cuff Size: Adult)   Pulse 70   Resp 16   Ht 1.88 m (6' 2\")   Wt 90.9 kg (200 lb 4.8 oz)   SpO2 91%     Current medications as of today   Current Outpatient Medications   Medication Sig Dispense Refill    albuterol 108 (90 Base) MCG/ACT Aero Soln inhalation aerosol Inhale 2 Puffs every four hours as needed for Shortness of Breath. 8.5 g 5    albuterol (PROVENTIL) 2.5mg/3ml Nebu Soln solution for nebulization Take 3 mL by nebulization every four hours as needed for Shortness of Breath. 120 mL 11    fluticasone-umeclidinium-vilanterol (TRELEGY ELLIPTA) 100-62.5-25 mcg/act inhaler Inhale 1 Puff every day. 2 Each 0    omeprazole (PRILOSEC) 40 MG delayed-release capsule Take 40 mg by mouth.      traZODone (DESYREL) 150 MG Tab TAKE 1 TABLET BY MOUTH AT BEDTIME AS NEEDED FOR SLEEP 90 Tablet 1    aspirin 81 MG EC tablet Take 1 Tablet by mouth every day.      finasteride (PROSCAR) 5 MG Tab       omeprazole (PRILOSEC) 40 MG delayed-release capsule Take 1 Capsule by mouth.      SPIRIVA HANDIHALER 18 MCG Cap INHALE THE CONTENTS OF 1  CAPSULE BY MOUTH VIA  HANDIHALER DAILY 90 Capsule 3    fluticasone-salmeterol (ADVAIR) " 500-50 MCG/DOSE AEROSOL POWDER, BREATH ACTIVATED Inhale 1 Puff 2 times a day. 3 Each 3    clopidogrel (PLAVIX) 75 MG Tab       doxazosin (CARDURA) 4 MG Tab       metoprolol SR (TOPROL XL) 25 MG TABLET SR 24 HR       atorvastatin (LIPITOR) 80 MG tablet Take 80 mg by mouth every evening.      Albuterol Sulfate (PROAIR RESPICLICK) 108 (90 Base) MCG/ACT AEROSOL POWDER, BREATH ACTIVATED Inhale 2 Puffs by mouth as needed (for shortness of breath, wheezing.). every 4-6 hours as needed. 1 Each 3    acetaminophen (TYLENOL) 500 MG Tab Take 500-1,000 mg by mouth every 6 hours as needed.      ezetimibe (ZETIA) 10 MG Tab Take 10 mg by mouth every day.      BRILINTA 90 MG Tab tablet Take 90 mg by mouth 2 Times a Day. Indications: Acute Coronary Syndrome      nitroglycerin (NITROSTAT) 0.4 MG SL Tab Place 1 Tab under tongue as needed for Chest Pain (Place 1 tablet under the tongue every 5 minutes up to 3 doses as needed for chest pain). 75 Tab 3    levothyroxine (SYNTHROID) 75 MCG Tab Take 75 mcg by mouth every morning on an empty stomach.      Cranberry 500 MG Cap Take 1 Cap by mouth every day.      Zinc 50 MG Cap Take 50 mg by mouth every day.      tamsulosin (FLOMAX) 0.4 MG capsule Take 0.4 mg by mouth ONE-HALF HOUR AFTER BREAKFAST.      atorvastatin (LIPITOR) 80 MG tablet Take 80 mg by mouth every day. (Patient not taking: Reported on 10/19/2023)      clopidogrel (PLAVIX) 75 MG Tab Take 75 mg by mouth every day. (Patient not taking: Reported on 10/19/2023)      metoprolol SR (TOPROL XL) 25 MG TABLET SR 24 HR Take 25 mg by mouth every day. (Patient not taking: Reported on 10/19/2023)      tamsulosin (FLOMAX) 0.4 MG capsule Take 0.4 mg by mouth. (Patient not taking: Reported on 10/19/2023)      traZODone (DESYREL) 50 MG Tab Take 50 mg by mouth. (Patient not taking: Reported on 10/19/2023)      omeprazole (PRILOSEC) 40 MG delayed-release capsule TAKE 1 CAPSULE BY MOUTH  TWICE DAILY (Patient not taking: Reported on 10/19/2023) 180  Capsule 3    Calcium Polycarbophil (FIBER) 625 MG Tab  (Patient not taking: Reported on 10/19/2023)      hydrocortisone (WESTCORT) 0.2 % cream  (Patient not taking: Reported on 10/19/2023)      ketoconazole (NIZORAL) 2 % shampoo  (Patient not taking: Reported on 10/19/2023)      levothyroxine (SYNTHROID) 75 MCG Tab Take 1 Tablet by mouth. (Patient not taking: Reported on 10/19/2023)      traZODone (DESYREL) 50 MG Tab Take 1 Tablet by mouth. (Patient not taking: Reported on 10/19/2023)      tamsulosin (FLOMAX) 0.4 MG capsule Take 1 Capsule by mouth. (Patient not taking: Reported on 10/19/2023)      zinc, chelated, 50 MG Tab  (Patient not taking: Reported on 10/19/2023)      Psyllium 0.52 GM Cap       furosemide (LASIX) 20 MG Tab  (Patient not taking: Reported on 6/16/2021)      HYDROcodone-acetaminophen 2.5-108 mg/5mL (HYCET) 7.5-325 MG/15ML solution  (Patient not taking: Reported on 6/16/2021)      levoFLOXacin (LEVAQUIN) 750 MG tablet  (Patient not taking: Reported on 10/19/2023)      methylPREDNISolone (MEDROL DOSEPAK) 4 MG Tablet Therapy Pack Take as directed. (Patient not taking: Reported on 6/16/2021) 21 Tab 0    azithromycin (ZITHROMAX) 250 MG Tab Take 2 tablets on day 1, then take 1 tablet a day for 4 days. (Patient not taking: Reported on 6/16/2021) 6 Tab 0    potassium chloride (MICRO-K) 10 MEQ capsule  (Patient not taking: Reported on 10/19/2023)      BIOTIN PO Take 1,000 mcg by mouth. (Patient not taking: Reported on 6/16/2021)      benazepril (LOTENSIN) 20 MG Tab Take 1 Tab by mouth every day. (Patient not taking: Reported on 10/19/2023) 90 Tab 3    Melatonin 10 MG Tab Take 10 mg by mouth every bedtime. (Patient not taking: Reported on 10/19/2023)      diphenhydrAMINE (BENADRYL) 25 MG Tab Take 25 mg by mouth every bedtime. (Patient not taking: Reported on 6/16/2021)      aspirin EC (ECOTRIN) 81 MG TBEC Take 81 mg by mouth every day. (Patient not taking: Reported on 10/19/2023)       No current  facility-administered medications for this visit.         Physical Exam:   Gen:           Alert and oriented, No apparent distress. Mood and affect appropriate, normal interaction with examiner.  Eyes:          PERRL, EOM intact, sclere white, conjunctive moist.  Ears:          Not examined.   Hearing:     Grossly intact.  Nose:          Normal, no lesions or deformities.  Dentition:    Good dentition.  Oropharynx:   Tongue normal, posterior pharynx without erythema or exudate.  Neck:        Supple, trachea midline, no masses.  Respiratory Effort: No intercostal retractions or use of accessory muscles.   Lung Auscultation:      Clear to auscultation bilaterally; no rales, rhonchi or wheezing.  CV:            Regular rate and rhythm. No murmurs, rubs or gallops.  Abd:           Not examined.   Lymphadenopathy: Not examined.  Gait and Station: Normal.  Digits and Nails: No clubbing, cyanosis, petechiae, or nodes.   Cranial Nerves: II-XII grossly intact.  Skin:        No rashes, lesions or ulcers noted.               Ext:           No cyanosis or edema.      Assessment:  1. Chronic obstructive pulmonary disease, unspecified COPD type (HCC)  albuterol 108 (90 Base) MCG/ACT Aero Soln inhalation aerosol    albuterol (PROVENTIL) 2.5mg/3ml Nebu Soln solution for nebulization    fluticasone-umeclidinium-vilanterol (TRELEGY ELLIPTA) 100-62.5-25 mcg/act inhaler    DME Mask and Supplies        Plan:  We will given to patient for 1 month supply of Trelegy to be used in place of his current Advair and Spiriva.  Currently only using his rescue inhaler 1 time a week.  He is also out of his nebulized medication therefore refill placed.  If patient finds better benefit with Trelegy, advised to message via [employer] and will restart this and cancel Advair and Spiriva.  Patient notes breathing is stable.  In regards to complex sleep apnea, continues to use ASV and using and benefiting from therapy.  Order placed for mask and supplies  through DME company [employer].    Please note that this dictation was created using voice recognition software. I have made every reasonable attempt to correct obvious errors, but it is possible there are errors of grammar and possibly content that I did not discover before finalizing the note.

## 2023-11-01 ENCOUNTER — TELEPHONE (OUTPATIENT)
Dept: SLEEP MEDICINE | Facility: MEDICAL CENTER | Age: 68
End: 2023-11-01

## 2023-11-01 NOTE — TELEPHONE ENCOUNTER
Pt called trying to talk about his Trilegy sample. Pt states it is working. Pt would like a RX. Trilegy/100mcg/62.5 mcg/25mcg.   Pt would like Rx sent to [facility] in [place].   Also needs a 90 day supply sent to [email or web address]to get mail order service. The [facility] is just to hold him over. He only has 9 doses left on sample.

## 2023-11-02 DIAGNOSIS — J44.9 CHRONIC OBSTRUCTIVE PULMONARY DISEASE, UNSPECIFIED COPD TYPE (HCC): ICD-10-CM

## 2023-11-02 RX ORDER — FLUTICASONE FUROATE, UMECLIDINIUM BROMIDE AND VILANTEROL TRIFENATATE 100; 62.5; 25 UG/1; UG/1; UG/1
1 POWDER RESPIRATORY (INHALATION) DAILY
Qty: 3 EACH | Refills: 3 | Status: SHIPPED | OUTPATIENT
Start: 2023-11-02

## 2023-11-02 RX ORDER — FLUTICASONE FUROATE, UMECLIDINIUM BROMIDE AND VILANTEROL TRIFENATATE 100; 62.5; 25 UG/1; UG/1; UG/1
1 POWDER RESPIRATORY (INHALATION) DAILY
Qty: 2 EACH | Refills: 0 | Status: SHIPPED | OUTPATIENT
Start: 2023-11-02

## 2024-03-28 DIAGNOSIS — G47.33 OSA (OBSTRUCTIVE SLEEP APNEA): ICD-10-CM

## 2024-03-28 DIAGNOSIS — G47.00 INSOMNIA, UNSPECIFIED TYPE: ICD-10-CM

## 2024-03-28 NOTE — TELEPHONE ENCOUNTER
Have we ever prescribed this med? Yes.  If yes, what date? 9/27/2023    Last OV: 10/19/2023- XU MONDRAGON     Next OV: NONE     DX: Insomnia, unspecified type [G47.00]; ADRYAN (obstructive sleep apnea) [G47.33]     Medications: traZODone (DESYREL) 150 MG Tab

## 2024-04-03 RX ORDER — TRAZODONE HYDROCHLORIDE 150 MG/1
TABLET ORAL
Qty: 90 TABLET | Refills: 1 | Status: SHIPPED | OUTPATIENT
Start: 2024-04-03

## 2024-08-13 NOTE — TELEPHONE ENCOUNTER
MEDICATION PRIOR AUTHORIZATION NEEDED:    1. Name of Medication: Temazepam 15 mg    2. Requested By (Name of Pharmacy): [facility]      3. Is insurance on file current? yes    4. What is the name & phone number of the 3rd party payor? [employer] [phone]     Detail Level: Zone Continue Regimen: Zoryve QD PRN flares\\nOtezla 30mg twice daily \\nClobetasol Ointment bid prn flares (mod/severe plaques)\\nHydrocortisone 2.5% cream bid prn flares (mild flares or flares on face)\\n\\nClobetasol ointment .5% ointment BID X 2 weeks on left foot Render In Strict Bullet Format?: No

## 2024-12-18 ENCOUNTER — TELEPHONE (OUTPATIENT)
Dept: SLEEP MEDICINE | Facility: MEDICAL CENTER | Age: 69
End: 2024-12-18
Payer: MEDICARE

## 2024-12-18 NOTE — TELEPHONE ENCOUNTER
Patient/Caller: mrn: [phone]      Topic/Issue: Medication refusal      Medication/Order: Trazadone 150mg      Pharmacy/DME: [facility] [place], [place]      Callback Number: [phone]

## 2024-12-19 ENCOUNTER — TELEPHONE (OUTPATIENT)
Dept: SLEEP MEDICINE | Facility: MEDICAL CENTER | Age: 69
End: 2024-12-19
Payer: MEDICARE

## 2024-12-19 NOTE — TELEPHONE ENCOUNTER
"1. Caller Name: Norberto White      Call Back Number: [phone] (home)       How would the patient prefer to be contacted with a response: Phone call OK to leave a detailed message    Patient called and LVM stating he had called multiple times prior to check on his medication refill for Trazadone.     Upon further inspection patient's refill was refused due to him not being seen in over a year. HANNY stated f/v within a year of the last appointment.       Called the patient back to inform him his medication was refused due to him not being seen, patient was very upset and stated \"so you dileep's weren't going to call me to schedule my follow ups?\" \"You guys are once again dropping the ball on me\" \"You're just going to let me go on without medication to sleep\"  I might as well go to [facility] like I do for everything else.\"   I kindly reminded the patient that it is their responsibility to schedule their follow ups and to book their appointments before their medications run out.   Patient asked if he could schedule the appointment and then get his medication refilled before he was seen. I let the patient know he needed to be seen in order for the medication to be refilled.   Patient then repeated what he stated as mentioned above. Patient became frustrated again.   I repeated to the patient that it was the patients responsibility to remember to schedule their follow ups.   I asked the patient if he would like to be scheduled to see Saul, patient then proceeded to speak to someone else regarding the situation and repeated that we again \" Dropped the ball\".   Patent asked when our next available was and I informed him it would be for 1/31. Patient then stopped speaking with me and I was connected to a woman, who asked if there were appointments available for the following week.   Patient was scheduled for 2/3/25.   "

## 2025-01-16 DIAGNOSIS — J44.9 CHRONIC OBSTRUCTIVE PULMONARY DISEASE, UNSPECIFIED COPD TYPE (HCC): ICD-10-CM

## 2025-01-17 RX ORDER — ALBUTEROL SULFATE 0.83 MG/ML
2.5 SOLUTION RESPIRATORY (INHALATION) EVERY 4 HOURS PRN
Qty: 90 ML | Refills: 5 | Status: SHIPPED | OUTPATIENT
Start: 2025-01-17 | End: 2025-01-23 | Stop reason: SDUPTHER

## 2025-01-23 ENCOUNTER — OFFICE VISIT (OUTPATIENT)
Dept: SLEEP MEDICINE | Facility: MEDICAL CENTER | Age: 70
End: 2025-01-23
Attending: NURSE PRACTITIONER
Payer: MEDICARE

## 2025-01-23 VITALS
BODY MASS INDEX: 26.69 KG/M2 | SYSTOLIC BLOOD PRESSURE: 108 MMHG | WEIGHT: 208 LBS | RESPIRATION RATE: 17 BRPM | DIASTOLIC BLOOD PRESSURE: 60 MMHG | OXYGEN SATURATION: 94 % | HEIGHT: 74 IN | HEART RATE: 60 BPM

## 2025-01-23 DIAGNOSIS — J44.9 CHRONIC OBSTRUCTIVE PULMONARY DISEASE, UNSPECIFIED COPD TYPE (HCC): ICD-10-CM

## 2025-01-23 DIAGNOSIS — G47.33 OSA (OBSTRUCTIVE SLEEP APNEA): ICD-10-CM

## 2025-01-23 DIAGNOSIS — G47.00 INSOMNIA, UNSPECIFIED TYPE: ICD-10-CM

## 2025-01-23 PROCEDURE — 3074F SYST BP LT 130 MM HG: CPT | Performed by: NURSE PRACTITIONER

## 2025-01-23 PROCEDURE — 99214 OFFICE O/P EST MOD 30 MIN: CPT | Performed by: NURSE PRACTITIONER

## 2025-01-23 PROCEDURE — 3078F DIAST BP <80 MM HG: CPT | Performed by: NURSE PRACTITIONER

## 2025-01-23 RX ORDER — TRAZODONE HYDROCHLORIDE 150 MG/1
150 TABLET ORAL
Qty: 90 TABLET | Refills: 3 | Status: SHIPPED | OUTPATIENT
Start: 2025-01-23 | End: 2025-01-23 | Stop reason: ALTCHOICE

## 2025-01-23 RX ORDER — ALBUTEROL SULFATE 90 UG/1
2 INHALANT RESPIRATORY (INHALATION) EVERY 4 HOURS PRN
Qty: 8.5 G | Refills: 5 | Status: SHIPPED | OUTPATIENT
Start: 2025-01-23

## 2025-01-23 RX ORDER — TRAZODONE HYDROCHLORIDE 150 MG/1
150 TABLET ORAL
Qty: 90 TABLET | Refills: 3 | Status: SHIPPED | OUTPATIENT
Start: 2025-01-23

## 2025-01-23 RX ORDER — ALBUTEROL SULFATE 0.83 MG/ML
2.5 SOLUTION RESPIRATORY (INHALATION) EVERY 4 HOURS PRN
Qty: 90 ML | Refills: 5 | Status: SHIPPED | OUTPATIENT
Start: 2025-01-23

## 2025-01-23 RX ORDER — TRAZODONE HYDROCHLORIDE 150 MG/1
150 TABLET ORAL NIGHTLY
Qty: 30 TABLET | Refills: 0 | Status: SHIPPED | OUTPATIENT
Start: 2025-01-23

## 2025-01-23 ASSESSMENT — PATIENT HEALTH QUESTIONNAIRE - PHQ9: CLINICAL INTERPRETATION OF PHQ2 SCORE: 0

## 2025-01-23 NOTE — PROGRESS NOTES
Chief Complaint   Patient presents with    Follow-Up       Last Office Visit 10/19/2023 with Saul Keys         HPI:  Norberto White is a 69 y.o. year old male here today for follow-up on  complex sleep apnea.  Last seen in sleep clinic on 10/19/2022.  Patient also has COPD and followed by pulmonary clinic.  Since last visit, patient had a heart attack and had esophageal cancer with surgical excision of lesions.  Feels that he is back to baseline since those procedures.  He is currently using ASV with supplemental oxygen bleed in at 3 L/min.  He is using a fullface mask.  He denies any difficulty with mask fit or pressure, but does get sores across the bridge of his nose.  He averages 8 to 10 hours of sleep nightly and denies any difficulty falling or staying asleep.  His current settings are ASV auto with CPAP 7 to 15 cm/H2O and pressure support 3 to 13 cm/H2O.  Compliance shows 100% use with an average time of 9 hours and 16 minutes and resultant AHI of 0.0.  The company is [employer]     30-day compliance on current device shows 100% use with an average time of 9 hours and 16 minutes and a residual AHI of 0.0 with ASV auto EPAP 7 to 15 cm and pressure support 3 to 13 cm/H2O.      ROS: As per HPI and otherwise negative if not stated.    Past Medical History:   Diagnosis Date    Acute MI, inferolateral wall, initial episode of care (HCC)     X5 since 2007 last one 8/2019, Dr. Andrade at [facility]    Anesthesia     PONV    Arthritis 03/05/2020    Joints    ASTHMA     inhaler daily    Breath shortness     Bronchitis 2014    CAD (coronary artery disease) 2/13/2014    Bare-metal stent to the circumflex in December 2013. Prior stent to the LAD diagonal had mild in-stent restenosis. 30% disease is noted in the right coronary artery.    Cancer ([facility]) 03/05/2020    Esophagus CURRENT    Chronic airway obstruction, not elsewhere classified     Congestive heart failure (HCC)     EMPHYSEMA     COPD    Essential hypertension,  benign 2/13/2014    High cholesterol     History of chronic cough     HLD (hyperlipidemia) 2/13/2014    Hypertension 2013    states well controlled on meds    ADRYAN on CPAP 6/28/2017    bipap    PAF (paroxysmal atrial fibrillation) (HCC) 7/26/2015    fixed with cardioversion, Dr Das follows    Pneumonia     2014    PONV (postoperative nausea and vomiting)     Psychiatric problem     anxiety    Unspecified disorder of thyroid 2010    on synthroid    Unspecified hemorrhagic conditions     bruises easily related to brilinta       Past Surgical History:   Procedure Laterality Date    ESOPHAGECTOMY THORASCOPIC N/A 6/22/2020    Procedure: ESOPHAGECTOMY, THORACOSCOPIC - NODE DISSECTION;  Surgeon: John H Ganser, M.D.;  Location: [facility];  Service: General    GA LAP GASTROSTOMY W/O TUBE CONSTR N/A 4/14/2020    Procedure: CREATION, GASTROSTOMY, LAPAROSCOPIC-JEJUNOSTOMY PLACEMENT;  Surgeon: John H Ganser, M.D.;  Location: [facility];  Service: General    GA ENDOSCOPIC US EXAM, ESOPH  3/6/2020    Procedure: EGD, WITH ENDOSCOPIC US - UPPER LINEAR RADIAL;  Surgeon: Gavino Cardenas M.D.;  Location: [facility];  Service: Gastroenterology    GASTROSCOPY-ENDO  3/6/2020    Procedure: GASTROSCOPY - W/ESOPHAGEAL STENTING AND BIOPSIES;  Surgeon: Gavino Cardenas M.D.;  Location: [facility];  Service: Gastroenterology    EGD WITH ASP/BX  3/6/2020    Procedure: EGD, WITH ASPIRATION BIOPSY - FNA;  Surgeon: Gavino Cardenas M.D.;  Location: [facility];  Service: Gastroenterology    MULTIPLE CORONARY ARTERY BYPASS ENDO VEIN HARVEST N/A 7/23/2015    Procedure: MULTIPLE CORONARY ARTERY BYPASS ENDO VEIN HARVEST;  Surgeon: Deuce Tam M.D.;  Location: [facility];  Service:     CERVICAL DISK AND FUSION ANTERIOR  10/13/2014    Performed by Moises Kerns M.D. at [facility]    OTHER ORTHOPEDIC SURGERY  2012 1975-present 5 surgeries- right knee  "x3, left knee x2     COLONOSCOPY  2009    OTHER CARDIAC SURGERY  2008    cardiac stents    DENTAL SURGERY  1971    wisdom teeth    TONSILLECTOMY  1958    HERNIA REPAIR  1956    'born with hernia'       Family History   Problem Relation Age of Onset    Heart Attack Father     Cancer Mother        Allergies as of 01/23/2025 - Reviewed 01/23/2025   Allergen Reaction Noted    Morphine Anxiety and Unspecified 10/14/2014        Vitals:  /60 (BP Location: Left arm, Patient Position: Sitting, BP Cuff Size: Adult)   Pulse 60   Resp 17   Ht 1.88 m (6' 2\")   Wt 94.3 kg (208 lb)   SpO2 94%     Current medications as of today   Current Outpatient Medications   Medication Sig Dispense Refill    traZODone (DESYREL) 150 MG Tab Take 1 Tablet by mouth at bedtime as needed for Sleep. 90 Tablet 3    traZODone (DESYREL) 150 MG Tab Take 1 Tablet by mouth every evening. 30 Tablet 0    albuterol (PROVENTIL) 2.5mg/3ml Nebu Soln solution for nebulization Take 3 mL by nebulization every four hours as needed for Shortness of Breath. 90 mL 5    albuterol 108 (90 Base) MCG/ACT Aero Soln inhalation aerosol Inhale 2 Puffs every four hours as needed for Shortness of Breath. 8.5 g 5    fluticasone-umeclidinium-vilanterol (TRELEGY ELLIPTA) 100-62.5-25 mcg/act inhaler Inhale 1 Puff every day. 2 Each 0    fluticasone-umeclidinium-vilanterol (TRELEGY ELLIPTA) 100-62.5-25 mcg/act inhaler Inhale 1 Puff every day. 3 Each 3    omeprazole (PRILOSEC) 40 MG delayed-release capsule Take 40 mg by mouth.      aspirin 81 MG EC tablet Take 1 Tablet by mouth every day.      finasteride (PROSCAR) 5 MG Tab       Psyllium 0.52 GM Cap       omeprazole (PRILOSEC) 40 MG delayed-release capsule Take 1 Capsule by mouth.      SPIRIVA HANDIHALER 18 MCG Cap INHALE THE CONTENTS OF 1  CAPSULE BY MOUTH VIA  HANDIHALER DAILY 90 Capsule 3    fluticasone-salmeterol (ADVAIR) 500-50 MCG/DOSE AEROSOL POWDER, BREATH ACTIVATED Inhale 1 Puff 2 times a day. 3 Each 3    " clopidogrel (PLAVIX) 75 MG Tab       doxazosin (CARDURA) 4 MG Tab       metoprolol SR (TOPROL XL) 25 MG TABLET SR 24 HR       atorvastatin (LIPITOR) 80 MG tablet Take 80 mg by mouth every evening.      Albuterol Sulfate (PROAIR RESPICLICK) 108 (90 Base) MCG/ACT AEROSOL POWDER, BREATH ACTIVATED Inhale 2 Puffs by mouth as needed (for shortness of breath, wheezing.). every 4-6 hours as needed. 1 Each 3    acetaminophen (TYLENOL) 500 MG Tab Take 500-1,000 mg by mouth every 6 hours as needed.      ezetimibe (ZETIA) 10 MG Tab Take 10 mg by mouth every day.      BRILINTA 90 MG Tab tablet Take 90 mg by mouth 2 Times a Day. Indications: Acute Coronary Syndrome      nitroglycerin (NITROSTAT) 0.4 MG SL Tab Place 1 Tab under tongue as needed for Chest Pain (Place 1 tablet under the tongue every 5 minutes up to 3 doses as needed for chest pain). 75 Tab 3    levothyroxine (SYNTHROID) 75 MCG Tab Take 75 mcg by mouth every morning on an empty stomach.      Cranberry 500 MG Cap Take 1 Cap by mouth every day.      Zinc 50 MG Cap Take 50 mg by mouth every day.      tamsulosin (FLOMAX) 0.4 MG capsule Take 0.4 mg by mouth ONE-HALF HOUR AFTER BREAKFAST.      atorvastatin (LIPITOR) 80 MG tablet Take 80 mg by mouth every day. (Patient not taking: Reported on 1/23/2025)      clopidogrel (PLAVIX) 75 MG Tab Take 75 mg by mouth every day. (Patient not taking: Reported on 1/23/2025)      metoprolol SR (TOPROL XL) 25 MG TABLET SR 24 HR Take 25 mg by mouth every day. (Patient not taking: Reported on 1/23/2025)      tamsulosin (FLOMAX) 0.4 MG capsule Take 0.4 mg by mouth. (Patient not taking: Reported on 1/23/2025)      traZODone (DESYREL) 50 MG Tab Take 50 mg by mouth. (Patient not taking: Reported on 1/23/2025)      omeprazole (PRILOSEC) 40 MG delayed-release capsule TAKE 1 CAPSULE BY MOUTH  TWICE DAILY (Patient not taking: Reported on 1/23/2025) 180 Capsule 3    Calcium Polycarbophil (FIBER) 625 MG Tab  (Patient not taking: Reported on  1/23/2025)      hydrocortisone (WESTCORT) 0.2 % cream  (Patient not taking: Reported on 1/23/2025)      ketoconazole (NIZORAL) 2 % shampoo  (Patient not taking: Reported on 1/23/2025)      levothyroxine (SYNTHROID) 75 MCG Tab Take 1 Tablet by mouth. (Patient not taking: Reported on 1/23/2025)      traZODone (DESYREL) 50 MG Tab Take 1 Tablet by mouth. (Patient not taking: Reported on 1/23/2025)      tamsulosin (FLOMAX) 0.4 MG capsule Take 1 Capsule by mouth. (Patient not taking: Reported on 1/23/2025)      zinc, chelated, 50 MG Tab  (Patient not taking: Reported on 1/23/2025)      furosemide (LASIX) 20 MG Tab  (Patient not taking: Reported on 1/23/2025)      HYDROcodone-acetaminophen 2.5-108 mg/5mL (HYCET) 7.5-325 MG/15ML solution  (Patient not taking: Reported on 1/23/2025)      levoFLOXacin (LEVAQUIN) 750 MG tablet  (Patient not taking: Reported on 1/23/2025)      methylPREDNISolone (MEDROL DOSEPAK) 4 MG Tablet Therapy Pack Take as directed. (Patient not taking: Reported on 1/23/2025) 21 Tab 0    azithromycin (ZITHROMAX) 250 MG Tab Take 2 tablets on day 1, then take 1 tablet a day for 4 days. (Patient not taking: Reported on 1/23/2025) 6 Tab 0    potassium chloride (MICRO-K) 10 MEQ capsule  (Patient not taking: Reported on 1/23/2025)      BIOTIN PO Take 1,000 mcg by mouth. (Patient not taking: Reported on 1/23/2025)      benazepril (LOTENSIN) 20 MG Tab Take 1 Tab by mouth every day. (Patient not taking: Reported on 1/23/2025) 90 Tab 3    Melatonin 10 MG Tab Take 10 mg by mouth every bedtime. (Patient not taking: Reported on 10/19/2023)      diphenhydrAMINE (BENADRYL) 25 MG Tab Take 25 mg by mouth every bedtime. (Patient not taking: Reported on 6/16/2021)      aspirin EC (ECOTRIN) 81 MG TBEC Take 81 mg by mouth every day. (Patient not taking: Reported on 10/19/2023)       No current facility-administered medications for this visit.         Physical Exam:   Gen:           Alert and oriented, No apparent distress.  Mood and affect appropriate, normal interaction with examiner.  Eyes:          PERRL, EOM intact, sclere white, conjunctive moist.  Ears:          Not examined.   Hearing:     Grossly intact.  Nose:          Normal, no lesions or deformities.  Dentition:    Good dentition.  Oropharynx:   Tongue normal, posterior pharynx without erythema or exudate.  Neck:        Supple, trachea midline, no masses.  Respiratory Effort: No intercostal retractions or use of accessory muscles.   Lung Auscultation:      Clear to auscultation bilaterally; no rales, rhonchi or wheezing.  CV:            Regular rate and rhythm. No murmurs, rubs or gallops.  Abd:           Not examined.   Lymphadenopathy: Not examined.  Gait and Station: Normal.  Digits and Nails: No clubbing, cyanosis, petechiae, or nodes.   Cranial Nerves: II-XII grossly intact.  Skin:        No rashes, lesions or ulcers noted.               Ext:           No cyanosis or edema.      Assessment:  1. Insomnia, unspecified type  traZODone (DESYREL) 150 MG Tab    traZODone (DESYREL) 150 MG Tab    DME Mask and Supplies    DISCONTINUED: traZODone (DESYREL) 150 MG Tab      2. ADRYAN (obstructive sleep apnea)  traZODone (DESYREL) 150 MG Tab    traZODone (DESYREL) 150 MG Tab    DME Mask and Supplies    DISCONTINUED: traZODone (DESYREL) 150 MG Tab      3. Chronic obstructive pulmonary disease, unspecified COPD type (HCC)  albuterol (PROVENTIL) 2.5mg/3ml Nebu Soln solution for nebulization    albuterol 108 (90 Base) MCG/ACT Aero Soln inhalation aerosol          Plan:  Using and benefiting from ASV therapy.  Compliance shows adequate use and control of ADRYAN.  Also using supplemental oxygen greater than 3 L/min.  Order placed for mask and supplies which will be good for 1 year.  Patient also request albuterol HFA and nebulizer or inhaler.  Refills placed as per patient request.  Also trazodone for insomnia.  Patient advised to follow-up in 3 to 6 months with pulmonary clinic, but states he  is doing stable and is currently asymptomatic.    Please note that this dictation was created using voice recognition software. I have made every reasonable attempt to correct obvious errors, but it is possible there are errors of grammar and possibly content that I did not discover before finalizing the note.

## 2025-03-24 DIAGNOSIS — J44.9 CHRONIC OBSTRUCTIVE PULMONARY DISEASE, UNSPECIFIED COPD TYPE (HCC): ICD-10-CM

## 2025-03-24 RX ORDER — FLUTICASONE FUROATE, UMECLIDINIUM BROMIDE AND VILANTEROL TRIFENATATE 100; 62.5; 25 UG/1; UG/1; UG/1
1 POWDER RESPIRATORY (INHALATION) DAILY
Qty: 180 EACH | Refills: 1 | Status: SHIPPED | OUTPATIENT
Start: 2025-03-24

## 2025-07-14 ENCOUNTER — OFFICE VISIT (OUTPATIENT)
Dept: SLEEP MEDICINE | Facility: MEDICAL CENTER | Age: 70
End: 2025-07-14
Payer: MEDICARE

## 2025-07-14 VITALS
BODY MASS INDEX: 28.23 KG/M2 | RESPIRATION RATE: 14 BRPM | OXYGEN SATURATION: 94 % | SYSTOLIC BLOOD PRESSURE: 116 MMHG | DIASTOLIC BLOOD PRESSURE: 84 MMHG | WEIGHT: 220 LBS | HEART RATE: 54 BPM | HEIGHT: 74 IN

## 2025-07-14 DIAGNOSIS — J32.0 CHRONIC MAXILLARY SINUSITIS: ICD-10-CM

## 2025-07-14 DIAGNOSIS — J44.9 CHRONIC OBSTRUCTIVE PULMONARY DISEASE, UNSPECIFIED COPD TYPE (HCC): Primary | ICD-10-CM

## 2025-07-14 PROCEDURE — 99214 OFFICE O/P EST MOD 30 MIN: CPT

## 2025-07-14 PROCEDURE — 3074F SYST BP LT 130 MM HG: CPT

## 2025-07-14 PROCEDURE — 99212 OFFICE O/P EST SF 10 MIN: CPT

## 2025-07-14 PROCEDURE — 3079F DIAST BP 80-89 MM HG: CPT

## 2025-07-14 RX ORDER — SODIUM CHLORIDE FOR INHALATION 3 %
4 VIAL, NEBULIZER (ML) INHALATION 2 TIMES DAILY PRN
Qty: 240 ML | Refills: 11 | Status: SHIPPED | OUTPATIENT
Start: 2025-07-14

## 2025-07-14 RX ORDER — FLUTICASONE PROPIONATE 50 MCG
1 SPRAY, SUSPENSION (ML) NASAL 2 TIMES DAILY
Qty: 16 G | Refills: 11 | Status: SHIPPED | OUTPATIENT
Start: 2025-07-14

## 2025-07-14 RX ORDER — FEXOFENADINE HCL 180 MG/1
180 TABLET ORAL DAILY
Qty: 30 TABLET | Refills: 11 | Status: SHIPPED | OUTPATIENT
Start: 2025-07-14

## 2025-07-14 RX ORDER — FLUTICASONE FUROATE, UMECLIDINIUM BROMIDE AND VILANTEROL TRIFENATATE 100; 62.5; 25 UG/1; UG/1; UG/1
1 POWDER RESPIRATORY (INHALATION) DAILY
Qty: 90 EACH | Refills: 3 | Status: SHIPPED | OUTPATIENT
Start: 2025-07-14

## 2025-07-14 RX ORDER — ALBUTEROL SULFATE 0.83 MG/ML
2.5 SOLUTION RESPIRATORY (INHALATION) EVERY 4 HOURS PRN
Qty: 360 ML | Refills: 11 | Status: SHIPPED | OUTPATIENT
Start: 2025-07-14

## 2025-07-14 ASSESSMENT — ENCOUNTER SYMPTOMS
HEARTBURN: 0
WEIGHT LOSS: 0
PALPITATIONS: 0
WHEEZING: 0
STRIDOR: 0
DIZZINESS: 0
HEMOPTYSIS: 0
COUGH: 1
SPUTUM PRODUCTION: 1
DEPRESSION: 0
SHORTNESS OF BREATH: 1
FEVER: 0
CHILLS: 0

## 2025-07-14 NOTE — PATIENT INSTRUCTIONS
Please start the following regimen every morning:     Albuterol nebulizer (can do flutter valve before this as well)  Sodium chloride nebulizer  Flutter valve  Trelegy    You can repeat the albuterol and sodium chloride nebulizers again in the evening if you feel congested.

## 2025-07-14 NOTE — ASSESSMENT & PLAN NOTE
Chronic but exacerbated multiple times in the past year.  Chronic bronchitis seems to be his main problem, he has difficulty with airway clearance and is not currently doing any regimen.  We discussed the importance of airway clearance, we will start albuterol, nebulized saline, flutter valve at home 1-2 x daily until symptoms improve then reduce as tolerated.     Continue Trelegy    Orders:    sodium chloride 3% 3 % nebulizer solution; Take 4 mL by nebulization 2 times a day as needed (chest congestion).    albuterol (PROVENTIL) 2.5mg/3ml Nebu Soln solution for nebulization; Take 3 mL by nebulization every four hours as needed for Shortness of Breath.    fluticasone-umeclidinium-vilanterol (TRELEGY ELLIPTA) 100-62.5-25 mcg/PUFF inhaler; Inhale 1 Puff every day.

## 2025-07-14 NOTE — PROGRESS NOTES
Pulmonary Clinic follow up    Date of Service: 7/14/2025     Reason for follow up:  COPD (Last seen 10/19/23) and Results (Echo 11/18/24)    History of Present Illness:     Norberto White is a 70 y.o. male being evaluated in clinic today for COPD. PMH includes ischemic cardiomyopathy, CAD, HF, HTN, hypothyroidism, malignant neoplasm of esophagus stage III s/p esophagectomy gastric pull-through in 2020, a-fib, ADRYAN on PAP therapy. Patient denies any childhood illnesses. Smoking history of 40 pack years, quit in 1995. Patient's family history includes: father - MI. Patient has had 4 exacerbations in the previous 12 months. Patient has never been intubated or hospitalized for their breathing. Patient is currently managed with Trelegy. Patient has used their rescue inhaler 3x in the past week.  Patient reports a moderate yellow/green mucus that is thick and tenacious at times.  He does not use his flutter valve as often as he should.  There is audible congestion when he is speaks during appointment.  He does have postnasal drip, using over-the-counter Claritin.  Triggers for his breathing include allergies.    MMRC Grade:   0- Breathless only during strenuous exercise  1- Short of breath when hurrying or going up a small hill  2- Walks slower than friends due to breathlessness, has to stop at own pace  3- Stops to catch breath on level ground after 100m  4- Breathless with ambulating around house or ADLs     Review of Systems   Constitutional:  Negative for chills, fever and weight loss.   HENT:  Positive for congestion.    Respiratory:  Positive for cough, sputum production and shortness of breath. Negative for hemoptysis, wheezing and stridor.    Cardiovascular:  Negative for chest pain, palpitations and leg swelling.   Gastrointestinal:  Negative for heartburn.   Skin:  Negative for rash.   Neurological:  Negative for dizziness.   Endo/Heme/Allergies:  Positive for environmental allergies.  "  Psychiatric/Behavioral:  Negative for depression.      Medications Ordered Prior to Encounter[1]    Social History[2]     Past Medical History[3]    Past Surgical History[4]    Morphine    Family History   Problem Relation Age of Onset    Heart Attack Father     Cancer Mother      /84 (BP Location: Right arm, Patient Position: Sitting, BP Cuff Size: Adult)   Pulse (!) 54   Resp 14   Ht 1.88 m (6' 2\")   Wt 99.8 kg (220 lb)   SpO2 94%      Physical Exam  Constitutional:       General: He is not in acute distress.  HENT:      Head: Normocephalic.      Nose: Nose normal.      Mouth/Throat:      Mouth: Mucous membranes are moist.   Eyes:      Conjunctiva/sclera: Conjunctivae normal.   Cardiovascular:      Rate and Rhythm: Normal rate and regular rhythm.      Heart sounds: No murmur heard.  Pulmonary:      Effort: Prolonged expiration present. No respiratory distress.      Breath sounds: Decreased breath sounds present. No wheezing.   Abdominal:      General: There is no distension.   Musculoskeletal:      Right lower leg: No edema.      Left lower leg: No edema.   Skin:     General: Skin is warm and dry.      Capillary Refill: Capillary refill takes less than 2 seconds.      Nails: There is no clubbing.   Neurological:      General: No focal deficit present.      Mental Status: He is alert and oriented to person, place, and time.   Psychiatric:         Mood and Affect: Mood normal.     Results:    PFT 6/26/2019:    Interpretation;      Spirometry showed FVC of 4.25 L, 84% predicted.  FEV1 was 2.85 L, 74% predicted.  FEV1/FVC ratio was 67%.  There was no significant response to bronchodilators.  Flow volume loop was consistent with obstruction.     Lung volumes showed air trapping with residual volume of 4.09 L, 168% predicted.  Total lung capacity was normal at 114% predicted.     Diffusion capacity was slightly increased to 134% of predicted.     Impression:  The patient had moderate obstructive ventilatory " defect with decreased FEV1/FVC ratio at 6 7% of predicted.  These findings are consistent with a patient listed diagnosis of COPD.  Clinical correlation is required.    CT chest 6/1/2021:    Lungs show no hypermetabolic pulmonary nodules.     There is no hypermetabolic hilar, mediastinal, or axillary lymphadenopathy.     Postsurgical changes from esophagectomy. Small bilateral pleural effusions, right more than left and right basilar atelectasis, similar to prior.    Echocardiogram 11/18/2024:    Conclusion   1. Mildly increased left ventricular cavity size ( LVEDV of 155 mL) with moderatley reduced systolic function. There is akinesis of the basal and mid inferior and basal inferoseptum and anteroseptum. Hypokinesis of the remaining wall segements. Moderately reduced ejection fraction 35-40% . Diastolic function is indeterminate due to arrhythmia.   2. Aortic valve is mildly sclerotic without stenosis. The valve is trileaflet in structure. No aortic regurgitation.   3. Normal right ventricular size with reduced function.   4. Severe left atrial enlargement. Mild right atrial enlargement.   5. Severely elevated pulmonary artery pressures with RVSP of 61 mmHg.   6. Comparison to prior echo done 4/5/21: LV ejection fraction decreased from 55-60% on prior echo to 35-40% on today's echo.  Severe pulmonary artery pressures now present.      Vaccinations:    Encourage annual covid and flu vaccines  Encourage pneumonia and RSV vaccines     Assessment & Plan  Chronic obstructive pulmonary disease, unspecified COPD type (HCC)    Chronic but exacerbated multiple times in the past year.  Chronic bronchitis seems to be his main problem, he has difficulty with airway clearance and is not currently doing any regimen.  We discussed the importance of airway clearance, we will start albuterol, nebulized saline, flutter valve at home 1-2 x daily until symptoms improve then reduce as tolerated.     Continue Trelegy    Orders:     sodium chloride 3% 3 % nebulizer solution; Take 4 mL by nebulization 2 times a day as needed (chest congestion).    albuterol (PROVENTIL) 2.5mg/3ml Nebu Soln solution for nebulization; Take 3 mL by nebulization every four hours as needed for Shortness of Breath.    fluticasone-umeclidinium-vilanterol (TRELEGY ELLIPTA) 100-62.5-25 mcg/PUFF inhaler; Inhale 1 Puff every day.    Chronic maxillary sinusitis    Postnasal drip appears to be triggering his breathing.  Start Allegra and Flonase.    Orders:    fexofenadine (ALLEGRA ALLERGY) 180 MG tablet; Take 1 Tablet by mouth every day.    fluticasone (FLONASE) 50 MCG/ACT nasal spray; Administer 1 Spray into affected nostril(S) 2 times a day.      Return in about 3 months (around 10/14/2025), or if symptoms worsen or fail to improve, for f/u on airway clearance .     This note was generated using voice recognition software which has a chance of producing errors of grammar and possibly content.  I have made every reasonable attempt to find and correct any obvious errors, but it should be expected that some may not be found prior to finalization of this note.    Time spent in record review prior to patient arrival, reviewing results, and in face-to-face encounter totaled 39 min, excluding any procedures if performed.    Ahmet MONDRAGON  [facility]           [1]   Current Outpatient Medications on File Prior to Visit   Medication Sig Dispense Refill    traZODone (DESYREL) 150 MG Tab Take 1 Tablet by mouth at bedtime as needed for Sleep. 90 Tablet 3    traZODone (DESYREL) 150 MG Tab Take 1 Tablet by mouth every evening. 30 Tablet 0    albuterol 108 (90 Base) MCG/ACT Aero Soln inhalation aerosol Inhale 2 Puffs every four hours as needed for Shortness of Breath. 8.5 g 5    aspirin 81 MG EC tablet Take 1 Tablet by mouth every day.      finasteride (PROSCAR) 5 MG Tab       Psyllium 0.52 GM Cap       omeprazole (PRILOSEC) 40 MG delayed-release capsule Take 1  Capsule by mouth.      clopidogrel (PLAVIX) 75 MG Tab       doxazosin (CARDURA) 4 MG Tab       metoprolol SR (TOPROL XL) 25 MG TABLET SR 24 HR       Albuterol Sulfate (PROAIR RESPICLICK) 108 (90 Base) MCG/ACT AEROSOL POWDER, BREATH ACTIVATED Inhale 2 Puffs by mouth as needed (for shortness of breath, wheezing.). every 4-6 hours as needed. 1 Each 3    acetaminophen (TYLENOL) 500 MG Tab Take 500-1,000 mg by mouth every 6 hours as needed.      ezetimibe (ZETIA) 10 MG Tab Take 10 mg by mouth every day.      BRILINTA 90 MG Tab tablet Take 90 mg by mouth 2 Times a Day. Indications: Acute Coronary Syndrome      nitroglycerin (NITROSTAT) 0.4 MG SL Tab Place 1 Tab under tongue as needed for Chest Pain (Place 1 tablet under the tongue every 5 minutes up to 3 doses as needed for chest pain). 75 Tab 3    levothyroxine (SYNTHROID) 75 MCG Tab Take 75 mcg by mouth every morning on an empty stomach.      Cranberry 500 MG Cap Take 1 Cap by mouth every day.      Zinc 50 MG Cap Take 50 mg by mouth every day.      tamsulosin (FLOMAX) 0.4 MG capsule Take 0.4 mg by mouth ONE-HALF HOUR AFTER BREAKFAST.      atorvastatin (LIPITOR) 80 MG tablet Take 80 mg by mouth every day. (Patient not taking: Reported on 1/23/2025)      clopidogrel (PLAVIX) 75 MG Tab Take 75 mg by mouth every day. (Patient not taking: Reported on 1/23/2025)      metoprolol SR (TOPROL XL) 25 MG TABLET SR 24 HR Take 25 mg by mouth every day. (Patient not taking: Reported on 1/23/2025)      tamsulosin (FLOMAX) 0.4 MG capsule Take 0.4 mg by mouth. (Patient not taking: Reported on 1/23/2025)      traZODone (DESYREL) 50 MG Tab Take 50 mg by mouth. (Patient not taking: Reported on 1/23/2025)      omeprazole (PRILOSEC) 40 MG delayed-release capsule Take 40 mg by mouth.      omeprazole (PRILOSEC) 40 MG delayed-release capsule TAKE 1 CAPSULE BY MOUTH  TWICE DAILY (Patient not taking: Reported on 1/23/2025) 180 Capsule 3    Calcium Polycarbophil (FIBER) 625 MG Tab  (Patient not  taking: Reported on 1/23/2025)      hydrocortisone (WESTCORT) 0.2 % cream  (Patient not taking: Reported on 1/23/2025)      ketoconazole (NIZORAL) 2 % shampoo  (Patient not taking: Reported on 1/23/2025)      levothyroxine (SYNTHROID) 75 MCG Tab Take 1 Tablet by mouth. (Patient not taking: Reported on 1/23/2025)      traZODone (DESYREL) 50 MG Tab Take 1 Tablet by mouth. (Patient not taking: Reported on 1/23/2025)      tamsulosin (FLOMAX) 0.4 MG capsule Take 1 Capsule by mouth. (Patient not taking: Reported on 1/23/2025)      zinc, chelated, 50 MG Tab  (Patient not taking: Reported on 1/23/2025)      furosemide (LASIX) 20 MG Tab  (Patient not taking: Reported on 1/23/2025)      HYDROcodone-acetaminophen 2.5-108 mg/5mL (HYCET) 7.5-325 MG/15ML solution  (Patient not taking: Reported on 1/23/2025)      levoFLOXacin (LEVAQUIN) 750 MG tablet  (Patient not taking: Reported on 1/23/2025)      methylPREDNISolone (MEDROL DOSEPAK) 4 MG Tablet Therapy Pack Take as directed. (Patient not taking: Reported on 1/23/2025) 21 Tab 0    azithromycin (ZITHROMAX) 250 MG Tab Take 2 tablets on day 1, then take 1 tablet a day for 4 days. (Patient not taking: Reported on 1/23/2025) 6 Tab 0    atorvastatin (LIPITOR) 80 MG tablet Take 80 mg by mouth every evening.      potassium chloride (MICRO-K) 10 MEQ capsule  (Patient not taking: Reported on 1/23/2025)      BIOTIN PO Take 1,000 mcg by mouth. (Patient not taking: Reported on 1/23/2025)      benazepril (LOTENSIN) 20 MG Tab Take 1 Tab by mouth every day. (Patient not taking: Reported on 1/23/2025) 90 Tab 3    Melatonin 10 MG Tab Take 10 mg by mouth every bedtime. (Patient not taking: Reported on 10/19/2023)      diphenhydrAMINE (BENADRYL) 25 MG Tab Take 25 mg by mouth every bedtime. (Patient not taking: Reported on 6/16/2021)      aspirin EC (ECOTRIN) 81 MG TBEC Take 81 mg by mouth every day. (Patient not taking: Reported on 10/19/2023)       No current facility-administered medications on  file prior to visit.   [2]   Social History  Tobacco Use    Smoking status: Former     Current packs/day: 0.00     Average packs/day: 2.0 packs/day for 20.0 years (40.0 ttl pk-yrs)     Types: Cigarettes, Cigars     Start date: 1975     Quit date: 1995     Years since quittin.5    Smokeless tobacco: Never    Tobacco comments:     Continued abstinence advised   Vaping Use    Vaping status: Never Used   Substance Use Topics    Alcohol use: Not Currently    Drug use: Yes     Types: Inhaled     Comment:  medical marijuana - last use 20   [3]   Past Medical History:  Diagnosis Date    Acute MI, inferolateral wall, initial episode of care ([facility])     X5 since  last one 2019, Dr. Andrade at [facility]    Anesthesia     PONV    Arthritis 2020    Joints    ASTHMA     inhaler daily    Breath shortness     Bronchitis     CAD (coronary artery disease) 2014    Bare-metal stent to the circumflex in 2013. Prior stent to the LAD diagonal had mild in-stent restenosis. 30% disease is noted in the right coronary artery.    Cancer ([facility]) 2020    Esophagus CURRENT    Chronic airway obstruction, not elsewhere classified     Congestive heart failure ([facility])     EMPHYSEMA     COPD    Essential hypertension, benign 2014    High cholesterol     History of chronic cough     HLD (hyperlipidemia) 2014    Hypertension 2013    states well controlled on meds    ADRYAN on CPAP 2017    bipap    PAF (paroxysmal atrial fibrillation) ([facility]) 2015    fixed with cardioversion, Dr Das follows    Pneumonia         PONV (postoperative nausea and vomiting)     Psychiatric problem     anxiety    Unspecified disorder of thyroid 2010    on synthroid    Unspecified hemorrhagic conditions     bruises easily related to brilinta   [4]   Past Surgical History:  Procedure Laterality Date    ESOPHAGECTOMY THORASCOPIC N/A 2020    Procedure: ESOPHAGECTOMY, THORACOSCOPIC - NODE DISSECTION;  Surgeon: Neri  H Ganser, M.D.;  Location: [facility];  Service: General    LA LAP GASTROSTOMY W/O TUBE CONSTR N/A 4/14/2020    Procedure: CREATION, GASTROSTOMY, LAPAROSCOPIC-JEJUNOSTOMY PLACEMENT;  Surgeon: John H Ganser, M.D.;  Location: [facility];  Service: General    LA ENDOSCOPIC US EXAM, ESOPH  3/6/2020    Procedure: EGD, WITH ENDOSCOPIC US - UPPER LINEAR RADIAL;  Surgeon: Gavino Cardenas M.D.;  Location: [facility];  Service: Gastroenterology    GASTROSCOPY-ENDO  3/6/2020    Procedure: GASTROSCOPY - W/ESOPHAGEAL STENTING AND BIOPSIES;  Surgeon: Gavino Cardenas M.D.;  Location: [facility];  Service: Gastroenterology    EGD WITH ASP/BX  3/6/2020    Procedure: EGD, WITH ASPIRATION BIOPSY - FNA;  Surgeon: Gavino Cardenas M.D.;  Location: [facility];  Service: Gastroenterology    MULTIPLE CORONARY ARTERY BYPASS ENDO VEIN HARVEST N/A 7/23/2015    Procedure: MULTIPLE CORONARY ARTERY BYPASS ENDO VEIN HARVEST;  Surgeon: Deuce Tam M.D.;  Location: [facility];  Service:     CERVICAL DISK AND FUSION ANTERIOR  10/13/2014    Performed by Moises Kerns M.D. at [facility]    OTHER ORTHOPEDIC SURGERY  2012 1975-present 5 surgeries- right knee x3, left knee x2     COLONOSCOPY  2009    OTHER CARDIAC SURGERY  2008    cardiac stents    DENTAL SURGERY  1971    wisdom teeth    TONSILLECTOMY  1958    HERNIA REPAIR  1956    'born with hernia'

## 2025-08-07 ENCOUNTER — HOSPITAL ENCOUNTER (OUTPATIENT)
Facility: MEDICAL CENTER | Age: 70
End: 2025-08-07
Attending: FAMILY MEDICINE
Payer: MEDICARE

## (undated) DEVICE — TUBING CLEARLINK DUO-VENT - C-FLO (48EA/CA)

## (undated) DEVICE — SET EXTENSION WITH 2 PORTS (48EA/CA) ***PART #2C8610 IS A SUBSTITUTE*****

## (undated) DEVICE — CANNULA W/SEAL 5X100 Z-THRE - ADED KII (12/BX)

## (undated) DEVICE — SYRINGE SAFETY 5 ML 18 GA X 1-1/2 BLUNT LL (100/BX 4BX/CA)

## (undated) DEVICE — SLEEVE, VASO, THIGH, MED

## (undated) DEVICE — GOWN WARMING STANDARD FLEX - (30/CA)

## (undated) DEVICE — ELECTRODE DUAL RETURN W/ CORD - (50/PK)

## (undated) DEVICE — SUTURE 0 SILK CT-1 (36PK/BX)

## (undated) DEVICE — SENSOR SPO2 ADULT LNCS ADTX (20/BX) ORDER ITEM #19593

## (undated) DEVICE — SUTURE 0 VICRYL PLUS CT-2 - 27 INCH (36/BX)

## (undated) DEVICE — CANISTER SUCTION RIGID RED 1500CC (40EA/CA)

## (undated) DEVICE — DRESSING NON-ADHERING 8 X 3 - (50/BX)

## (undated) DEVICE — TUBE CONNECTING SUCTION - CLEAR PLASTIC STERILE 72 IN (50EA/CA)

## (undated) DEVICE — PROTECTOR ULNA NERVE - (36PR/CA)

## (undated) DEVICE — ANTI-FOG SOLUTION - 60BTL/CA

## (undated) DEVICE — CATHETER IV SAFETY 20 GA X 1-1/4 (50/BX)

## (undated) DEVICE — TROCAR 5X100 NON BLADED Z-TH - READ KII (6/BX)

## (undated) DEVICE — TUBE JEJUNOSTOMY MIC-KEY 14FR - (1/EA)

## (undated) DEVICE — MASK WITH FACE SHIELD (25/BX 4BX/CA)

## (undated) DEVICE — SPONGE XRAY 8X4 STERL. 12PL - (10EA/TY 80TY/CA)

## (undated) DEVICE — HEAD HOLDER JUNIOR/ADULT

## (undated) DEVICE — KIT ANESTHESIA W/CIRCUIT & 3/LT BAG W/FILTER (20EA/CA)

## (undated) DEVICE — SUTURE 4-0 VICRYL PLUS FS-2 - 27 INCH (36/BX)

## (undated) DEVICE — DRAPE X LONG COILED INSTRUMENT ORGANIZER EUS (20EA/BX)

## (undated) DEVICE — SPONGE DRAIN 4 X 4IN 6-PLY - (2/PK25PK/BX12BX/CS)

## (undated) DEVICE — CANNULA W/SEAL12X100ZTHREAD - (12/BX)

## (undated) DEVICE — TOWELS CLOTH SURGICAL - (4/PK 20PK/CA)

## (undated) DEVICE — TROCAR Z THREAD12MM OPTICAL - NON BLADED (6/BX)

## (undated) DEVICE — ELECTRODE 850 FOAM ADHESIVE - HYDROGEL RADIOTRNSPRNT (50/PK)

## (undated) DEVICE — LACTATED RINGERS INJ 1000 ML - (14EA/CA 60CA/PF)

## (undated) DEVICE — PACK LAP CHOLE OR - (2EA/CA)

## (undated) DEVICE — CANNULA O2 COMFORT SOFT EAR ADULT 7 FT TUBING (50/CA)

## (undated) DEVICE — CLIP MED LG INTNL HRZN TI ESCP - (20/BX)

## (undated) DEVICE — CONNECTOR Y TBG CRTY 5 IN 1 STERILE (50EA/CA)

## (undated) DEVICE — CHLORAPREP 26 ML APPLICATOR - ORANGE TINT(25/CA)

## (undated) DEVICE — SENSOR SPO2 NEO LNCS ADHESIVE (20/BX) SEE USER NOTES

## (undated) DEVICE — GLOVE BIOGEL M SZ 8 SURGICAL PF LTX - (50/BX 4BX/CA)

## (undated) DEVICE — TUBE SUCTION YANKAUER  1/4 X 6FT (20EA/CA)"

## (undated) DEVICE — SUCTION INSTRUMENT YANKAUER BULBOUS TIP W/O VENT (50EA/CA)

## (undated) DEVICE — CANISTER SUCTION 3000ML MECHANICAL FILTER AUTO SHUTOFF MEDI-VAC NONSTERILE LF DISP  (40EA/CA)

## (undated) DEVICE — RELOAD WITH GRIPPING SURFACE TECHNOLOGY GOLD 60MM (12EA/BX)

## (undated) DEVICE — SET LEADWIRE 5 LEAD BEDSIDE DISPOSABLE ECG (1SET OF 5/EA)

## (undated) DEVICE — TRAY CATHETER FOLEY URINE METER W/STATLOCK 350ML (10EA/CA)

## (undated) DEVICE — DRAPE CHEST/BREAST - (12EA/CA)

## (undated) DEVICE — NEPTUNE 4 PORT MANIFOLD - (20/PK)

## (undated) DEVICE — Device

## (undated) DEVICE — FORCEP RADIAL JAW 4 STANDARD CAPACITY W/NEEDLE 240CM (40EA/BX)

## (undated) DEVICE — DRAPESURG STERI-DRAPE LONG - (10/BX 4BX/CA)

## (undated) DEVICE — SODIUM CHL IRRIGATION 0.9% 1000ML (12EA/CA)

## (undated) DEVICE — SUTURE GENERAL

## (undated) DEVICE — MASK ANESTHESIA ADULT  - (100/CA)

## (undated) DEVICE — STAPLER POWERED 60MM (3EA/BX)

## (undated) DEVICE — SUTURE 3-0 SILK SH (12PK/BX)

## (undated) DEVICE — BITE BLOCK ADULT 60FR (100EA/CA)

## (undated) DEVICE — ENDOSTITCH10MM SUTURING DEVIC - (3/CA)

## (undated) DEVICE — NEEDLE INSFL 120MM 14GA VRRS - (20/BX)

## (undated) DEVICE — GOWN SURGEONS LARGE - (32/CA)

## (undated) DEVICE — ENDOSTITCH LOAD UNIT 0 SURGI - 12/CA

## (undated) DEVICE — SUTURE 3-0 SILK SH C/R 18 IN - (12/BX)

## (undated) DEVICE — CLIP MED INTNL HRZN TI ESCP - (25/BX)

## (undated) DEVICE — KIT  I.V. START (100EA/CA)

## (undated) DEVICE — SPONGE GAUZE NON-STERILE 4X4 - (2000/CA 10PK/CA)

## (undated) DEVICE — SOD. CHL. INJ. 0.9% 1000 ML - (14EA/CA 60CA/PF)

## (undated) DEVICE — WATER IRRIGATION STERILE 1000ML (12EA/CA)

## (undated) DEVICE — DRAIN J-VAC 19FR. ROUND - (10/CS)

## (undated) DEVICE — TUBING LAPAROSCOPIC PLUME DEVICE (10EA/CA)

## (undated) DEVICE — KIT ROOM DECONTAMINATION

## (undated) DEVICE — GLOVE, LITE (PAIR)

## (undated) DEVICE — SYRINGE SAFETY 10 ML 18 GA X 1 1/2 BLUNT LL (100/BX 4BX/CA)

## (undated) DEVICE — STAPLE 60MM WHTE 2.6MM - (12/BX) WAS PART #ECR60W